# Patient Record
Sex: FEMALE | Race: WHITE | HISPANIC OR LATINO | Employment: UNEMPLOYED | URBAN - METROPOLITAN AREA
[De-identification: names, ages, dates, MRNs, and addresses within clinical notes are randomized per-mention and may not be internally consistent; named-entity substitution may affect disease eponyms.]

---

## 2017-02-15 ENCOUNTER — OFFICE VISIT (OUTPATIENT)
Dept: OBSTETRICS AND GYNECOLOGY | Age: 42
End: 2017-02-15

## 2017-02-15 ENCOUNTER — PROCEDURE VISIT (OUTPATIENT)
Dept: OBSTETRICS AND GYNECOLOGY | Age: 42
End: 2017-02-15

## 2017-02-15 VITALS
DIASTOLIC BLOOD PRESSURE: 62 MMHG | SYSTOLIC BLOOD PRESSURE: 118 MMHG | BODY MASS INDEX: 20.2 KG/M2 | HEIGHT: 63 IN | WEIGHT: 114 LBS

## 2017-02-15 DIAGNOSIS — R10.2 PELVIC PAIN: Primary | ICD-10-CM

## 2017-02-15 DIAGNOSIS — D25.9 UTERINE LEIOMYOMA, UNSPECIFIED LOCATION: ICD-10-CM

## 2017-02-15 LAB
BILIRUB BLD-MCNC: NEGATIVE MG/DL
CLARITY, POC: CLEAR
COLOR UR: YELLOW
GLUCOSE UR STRIP-MCNC: NEGATIVE MG/DL
KETONES UR QL: NEGATIVE
LEUKOCYTE EST, POC: NEGATIVE
NITRITE UR-MCNC: NEGATIVE MG/ML
PH UR: 6 [PH] (ref 5–8)
PROT UR STRIP-MCNC: NEGATIVE MG/DL
RBC # UR STRIP: NEGATIVE /UL
SP GR UR: 1.01 (ref 1–1.03)
UROBILINOGEN UR QL: NORMAL

## 2017-02-15 PROCEDURE — 76830 TRANSVAGINAL US NON-OB: CPT | Performed by: PHYSICIAN ASSISTANT

## 2017-02-15 PROCEDURE — 81002 URINALYSIS NONAUTO W/O SCOPE: CPT | Performed by: PHYSICIAN ASSISTANT

## 2017-02-15 PROCEDURE — 99213 OFFICE O/P EST LOW 20 MIN: CPT | Performed by: PHYSICIAN ASSISTANT

## 2017-02-15 RX ORDER — RIZATRIPTAN BENZOATE 10 MG/1
10 TABLET ORAL
COMMUNITY
Start: 2015-04-14 | End: 2017-05-17

## 2017-02-15 RX ORDER — ZOLPIDEM TARTRATE 12.5 MG/1
TABLET, FILM COATED, EXTENDED RELEASE ORAL
COMMUNITY
Start: 2017-01-31 | End: 2017-05-17

## 2017-02-15 NOTE — PROGRESS NOTES
"Subjective     Chief Complaint   Patient presents with   • Abdominal Pain       Cari Knight is a 41 y.o. No obstetric history on file. whose LMP is Patient's last menstrual period was 01/20/2017 (approximate). presents with new onset pelvic pain.  Started with LLQ pain approx 6 months.  Then had pain on B pelvic regions approx 2 wks ago.  It was like contractions. Would get 1-1 1/2 minutes rest b/t them.  She did take ibuprofen and didn't feel like it helped.  Friday night, then r/s with it a week later    Does have regular periods but has started with cramping more recently.  She has been seen recently for infertility work up but says she is not trying for fertility.      No significant med hx    She is a regular pt of Dr Anthony's, will be due for her routine annual at the end of March, she is new to me    No Additional Complaints Reported    The following portions of the patient's history were reviewed and updated as appropriate:vital signs, allergies, current medications, past medical history, past social history, past surgical history and problem list      Review of Systems   Pertinent items are noted in HPI.     Objective        Visit Vitals   • /62   • Ht 63\" (160 cm)   • Wt 114 lb (51.7 kg)   • LMP 01/20/2017 (Approximate)   • Breastfeeding No   • BMI 20.19 kg/m2       Physical Exam    General:   alert, comfortable and no distress   Heart: Not performed today   Lungs: Not performed today.   Breast: Not performed today   Neck: na   Abdomen: {soft, non-tender. Bowel sounds normal. No masses,  no organomegaly   CVA: absent   Pelvis: Not performed today   Extremities: Not performed today   Neurologic: negative   Psychiatric: Normal affect, judgement, and mood       Lab Review   Labs: Urinalysis - with micro     Imaging   Ultrasound - Pelvic Vaginal    Assessment/Plan     ASSESSMENT  1. Pelvic pain    2. Uterine leiomyoma, unspecified location        PLAN  1.   Orders Placed This Encounter   Procedures " "  • Urine Culture   • POC Urinalysis Dipstick       2. Medications prescribed this encounter:        New Medications Ordered This Visit   Medications   • zolpidem CR (AMBIEN CR) 12.5 MG CR tablet     Sig: TAKE ONE TABLET BY MOUTH ONCE NIGHTLY AS NEEDED FOR SLEEP   • rizatriptan (MAXALT) 10 MG tablet     Sig: Take 10 mg by mouth.       3. Call if acute pain happens and we can  Try to work in for same day u/s.  Recommend f/u with PCP to consider CT if pain persists to r/o GI or Appendix issues.  Will r/o UTI.  Pt has routine exam scheduled for end of March, will keep that  4.  Enc good fiber and hydration to keep bowels stable as well  5. Discussed trying BCP to get ovaries to \"quiet down\" and if by some chance this is endometriosis related, that would help with those sx's as well  U/S done and shows a fibroid that measures 18.8 mm, unlikely cause of her pain.  Lining measures 10.83 mm, appropriate that she is getting ready to start her cycle.  B ovaries are wnl.      Follow up: 1 month(s)    JASVIR Farmer  2/15/2017           "

## 2017-02-17 ENCOUNTER — TELEPHONE (OUTPATIENT)
Dept: OBSTETRICS AND GYNECOLOGY | Age: 42
End: 2017-02-17

## 2017-02-17 LAB
BACTERIA UR CULT: NO GROWTH
BACTERIA UR CULT: NORMAL

## 2017-02-17 NOTE — TELEPHONE ENCOUNTER
----- Message from JASVIR Kinney sent at 2/17/2017  9:18 AM EST -----  Let pt know her urine culture is neg for infection

## 2017-04-10 ENCOUNTER — TELEPHONE (OUTPATIENT)
Dept: OBSTETRICS AND GYNECOLOGY | Age: 42
End: 2017-04-10

## 2017-04-10 ENCOUNTER — OFFICE VISIT (OUTPATIENT)
Dept: OBSTETRICS AND GYNECOLOGY | Age: 42
End: 2017-04-10

## 2017-04-10 ENCOUNTER — PROCEDURE VISIT (OUTPATIENT)
Dept: OBSTETRICS AND GYNECOLOGY | Age: 42
End: 2017-04-10

## 2017-04-10 VITALS
HEIGHT: 63 IN | SYSTOLIC BLOOD PRESSURE: 120 MMHG | BODY MASS INDEX: 19.67 KG/M2 | WEIGHT: 111 LBS | DIASTOLIC BLOOD PRESSURE: 80 MMHG

## 2017-04-10 DIAGNOSIS — N92.0 MENORRHAGIA WITH REGULAR CYCLE: ICD-10-CM

## 2017-04-10 DIAGNOSIS — R10.2 PELVIC PAIN: Primary | ICD-10-CM

## 2017-04-10 DIAGNOSIS — D25.9 UTERINE LEIOMYOMA, UNSPECIFIED LOCATION: ICD-10-CM

## 2017-04-10 DIAGNOSIS — R10.32 LLQ PAIN: Primary | ICD-10-CM

## 2017-04-10 LAB
BILIRUB BLD-MCNC: NEGATIVE MG/DL
CLARITY, POC: CLEAR
COLOR UR: YELLOW
GLUCOSE UR STRIP-MCNC: NEGATIVE MG/DL
KETONES UR QL: ABNORMAL
LEUKOCYTE EST, POC: NEGATIVE
NITRITE UR-MCNC: NEGATIVE MG/ML
PH UR: 5.5 [PH] (ref 5–8)
PROT UR STRIP-MCNC: NEGATIVE MG/DL
RBC # UR STRIP: ABNORMAL /UL
SP GR UR: 1.03 (ref 1–1.03)
UROBILINOGEN UR QL: NORMAL

## 2017-04-10 PROCEDURE — 99214 OFFICE O/P EST MOD 30 MIN: CPT | Performed by: NURSE PRACTITIONER

## 2017-04-10 PROCEDURE — 76830 TRANSVAGINAL US NON-OB: CPT | Performed by: OBSTETRICS & GYNECOLOGY

## 2017-04-10 PROCEDURE — 81002 URINALYSIS NONAUTO W/O SCOPE: CPT | Performed by: NURSE PRACTITIONER

## 2017-04-10 NOTE — TELEPHONE ENCOUNTER
Dr Bustos pt, started period last night and has been having L side pelvic pain. Pt states that is a sharp stabbing pain and will sometimes ease up to a dull throb. Pt is wanting an U/S, please advise.    Pt # [phone]

## 2017-04-10 NOTE — PROGRESS NOTES
"Subjective   Cari Knight is a 41 y.o. female is here today as a self referral.    History of Present Illness     Chief Complaint   Patient presents with   • Abdominal Pain     Left side     Patient here today with left sided lower abdominal pain that started with her cycle yesterday evening.  She is a new patient to me but this is not a new problem.  She has been seen for this problem on a few occasions.  Her most recent visit was in February and they were not able to find a source of her pain.  She describes it as severe and lasting for 10-15 seconds at a time.  Usually she says that the pain is at the end of her cycle but this time it was the beginning.  She currently isn't having pain but was when she was driving to our office.  She denies any bowel or bladder problems.  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  Her periods do seem to be getting heavier the older she gets and she reports the pain and occurring for \"years\" but worse for the past 6-8 months.      The following portions of the patient's history were reviewed and updated as appropriate: allergies, current medications, past family history, past medical history, past social history, past surgical history and problem list.    Review of Systems   Constitutional: Negative.    HENT: Negative.    Eyes: Negative.    Respiratory: Negative.    Cardiovascular: Negative.    Gastrointestinal: Negative.    Endocrine: Negative.    Genitourinary: Positive for menstrual problem, pelvic pain and vaginal bleeding. Negative for dyspareunia, dysuria, flank pain and urgency.   Musculoskeletal: Negative.    Skin: Negative.    Allergic/Immunologic: Negative.    Neurological: Negative.    Hematological: Negative.    Psychiatric/Behavioral: Negative.        Objective   Physical Exam   Constitutional: She is oriented to person, place, and time. She appears " well-developed and well-nourished.   Cardiovascular: Normal rate.    Pulmonary/Chest: Effort normal.   Abdominal: Soft. Bowel sounds are normal. She exhibits no distension. There is no tenderness. There is no rebound and no guarding.   Genitourinary: Vagina normal and uterus normal. Uterus is not tender. Cervix exhibits no motion tenderness and no friability. Right adnexum displays no mass, no tenderness and no fullness. Left adnexum displays tenderness. Left adnexum displays no mass and no fullness.   Genitourinary Comments: Cervix very small   Neurological: She is alert and oriented to person, place, and time.   Skin: Skin is warm and dry.   Psychiatric: She has a normal mood and affect. Her behavior is normal.         Assessment/Plan   Cari was seen today for abdominal pain.    Diagnoses and all orders for this visit:    LLQ pain  -     POC Urinalysis Dipstick    Menorrhagia with regular cycle      Ultrasound done and ovaries normal.  She does have a small fibroid that was noted on her previous ultrasound but does not look changed.  Since her pain is always in relation to her periods I would suggest doing something to help lighten her cycle and see if she sees improvement.  We discussed low dose birth control pills that would also suppress her ovaries in case her pain is in relation to them but I see no evidence of that today.  She could also consider an IUD or discuss surgical options at her annual exam with Dr Anthony.  If she does decide to try the IUD I would recommend cytotec prior to placement.  She understands that an IUD may or may not improve her pain since we do not have a definitive diagnosis for the pain.  If other symptoms develop she may consider following up with PCP to rule out any other cause of pain but she feels certain it is related to her menses. We discussed R/B/A of different IUDs and brochures were given.  Discussed no unprotected IC 2 weeks prior to insertion.

## 2017-04-10 NOTE — TELEPHONE ENCOUNTER
She can come at 10:30 for ultrasound and 10:45 with me.  If she can/t do that you can offer her later this week.

## 2017-05-17 ENCOUNTER — OFFICE VISIT (OUTPATIENT)
Dept: OBSTETRICS AND GYNECOLOGY | Age: 42
End: 2017-05-17

## 2017-05-17 VITALS
DIASTOLIC BLOOD PRESSURE: 80 MMHG | SYSTOLIC BLOOD PRESSURE: 130 MMHG | BODY MASS INDEX: 19.49 KG/M2 | HEIGHT: 63 IN | WEIGHT: 110 LBS

## 2017-05-17 DIAGNOSIS — R10.2 PELVIC PAIN: ICD-10-CM

## 2017-05-17 DIAGNOSIS — N92.0 MENORRHAGIA WITH REGULAR CYCLE: ICD-10-CM

## 2017-05-17 DIAGNOSIS — Z11.51 SCREENING FOR HPV (HUMAN PAPILLOMAVIRUS): ICD-10-CM

## 2017-05-17 DIAGNOSIS — Z01.419 WELL WOMAN EXAM WITH ROUTINE GYNECOLOGICAL EXAM: Primary | ICD-10-CM

## 2017-05-17 PROCEDURE — 99396 PREV VISIT EST AGE 40-64: CPT | Performed by: OBSTETRICS & GYNECOLOGY

## 2017-05-17 RX ORDER — ZOLPIDEM TARTRATE 6.25 MG/1
12.5 TABLET, FILM COATED, EXTENDED RELEASE ORAL
COMMUNITY
Start: 2017-04-28 | End: 2018-07-10 | Stop reason: SDUPTHER

## 2017-05-17 RX ORDER — ADAPALENE 0.1 G/100G
CREAM TOPICAL
COMMUNITY
Start: 2016-10-21 | End: 2018-11-16 | Stop reason: SDUPTHER

## 2017-05-19 LAB
CYTOLOGIST CVX/VAG CYTO: NORMAL
CYTOLOGY CVX/VAG DOC THIN PREP: NORMAL
DX ICD CODE: NORMAL
HIV 1 & 2 AB SER-IMP: NORMAL
HPV I/H RISK 1 DNA CVX QL PROBE+SIG AMP: NEGATIVE
OTHER STN SPEC: NORMAL
PATH REPORT.FINAL DX SPEC: NORMAL
STAT OF ADQ CVX/VAG CYTO-IMP: NORMAL

## 2017-05-23 ENCOUNTER — TELEPHONE (OUTPATIENT)
Dept: OBSTETRICS AND GYNECOLOGY | Age: 42
End: 2017-05-23

## 2018-07-10 ENCOUNTER — OFFICE VISIT (OUTPATIENT)
Dept: OBSTETRICS AND GYNECOLOGY | Age: 43
End: 2018-07-10

## 2018-07-10 VITALS
BODY MASS INDEX: 19.03 KG/M2 | WEIGHT: 107.4 LBS | SYSTOLIC BLOOD PRESSURE: 126 MMHG | HEIGHT: 63 IN | DIASTOLIC BLOOD PRESSURE: 88 MMHG

## 2018-07-10 DIAGNOSIS — L72.3 INFLAMED SEBACEOUS CYST: Primary | ICD-10-CM

## 2018-07-10 PROCEDURE — 99213 OFFICE O/P EST LOW 20 MIN: CPT | Performed by: OBSTETRICS & GYNECOLOGY

## 2018-07-10 RX ORDER — ZOLPIDEM TARTRATE 12.5 MG/1
TABLET, FILM COATED, EXTENDED RELEASE ORAL
COMMUNITY
Start: 2018-05-29 | End: 2018-11-16 | Stop reason: SDUPTHER

## 2018-07-10 RX ORDER — METOPROLOL SUCCINATE 50 MG/1
TABLET, EXTENDED RELEASE ORAL
COMMUNITY
Start: 2018-06-15 | End: 2018-11-16 | Stop reason: SDUPTHER

## 2018-07-10 NOTE — PROGRESS NOTES
"Subjective     Chief Complaint   Patient presents with   • Follow-up     left side lump       Cari Mathis is a 43 y.o.  whose LMP is Patient's last menstrual period was 2018 (exact date). presents with some left axillary skin lesions. She noticed them in the mirror. They were red raised hard lesions in the middle of the left axilla in the skin. No deeper lesions noted. No breast lesions or changes noted. Last MMG 2017 normal. The skin lesions are resolving. She denies having similar lesions in the groin area.    No Additional Complaints Reported    The following portions of the patient's history were reviewed and updated as appropriate:vital signs, allergies, current medications, past family history, past medical history, past social history, past surgical history and problem list      Review of Systems   Constitutional: negative for fever, chills, activity change, appetite change, fatigue and unexpected weight change.  Eyes: negative  Ears, nose, mouth, throat, and face: negative  Respiratory: negative  Cardiovascular: negative  Gastrointestinal: negative  Genitourinary:negative  Integument/breast: positive for left axillary skin lesion  Hematologic/lymphatic: negative  Musculoskeletal:negative  Neurological: negative  Behavioral/Psych: negative  Endocrine: negative     Objective      /88   Ht 160 cm (63\")   Wt 48.7 kg (107 lb 6.4 oz)   LMP 2018 (Exact Date)   BMI 19.03 kg/m²     Physical Exam    General:   alert, appears stated age and no distress   Heart: Not performed today   Lungs: Not performed today.   Breast: Left breast- no masses, no nipple changes or discharge, left axilla without LAD, 2 small resolving skin lesions consistent w/ sebaceous cyst noted   Neck: thyroid not enlarged, symmetric, no tenderness/mass/nodules   Abdomen: {Not performed today   CVA:    Pelvis:    Extremities: Extremities normal, atraumatic, no cyanosis or edema   Neurologic: negative   Psychiatric: " Normal affect, judgement, and mood       Lab Review   Labs: No data reviewed     Imaging   No data reviewed    Assessment/Plan     ASSESSMENT  1. Inflamed sebaceous cyst    of left axilla    PLAN  1. No orders of the defined types were placed in this encounter.      2. Medications prescribed this encounter:      No orders of the defined types were placed in this encounter.      3. IF continues to have recurrent infected sebaceous cysts, we can have general surgery excise them. May also consider laser hair removal to avoid cutting skin and introducing bacteria and increasing risk of infections.    Follow up: for annual and MMG when able     Demi Anthony MD  7/10/2018

## 2018-11-16 ENCOUNTER — OFFICE VISIT (OUTPATIENT)
Dept: OBSTETRICS AND GYNECOLOGY | Age: 43
End: 2018-11-16

## 2018-11-16 ENCOUNTER — PROCEDURE VISIT (OUTPATIENT)
Dept: OBSTETRICS AND GYNECOLOGY | Age: 43
End: 2018-11-16

## 2018-11-16 ENCOUNTER — APPOINTMENT (OUTPATIENT)
Dept: WOMENS IMAGING | Facility: HOSPITAL | Age: 43
End: 2018-11-16

## 2018-11-16 VITALS
BODY MASS INDEX: 18.61 KG/M2 | WEIGHT: 105 LBS | DIASTOLIC BLOOD PRESSURE: 74 MMHG | HEIGHT: 63 IN | SYSTOLIC BLOOD PRESSURE: 120 MMHG

## 2018-11-16 DIAGNOSIS — Z12.31 VISIT FOR SCREENING MAMMOGRAM: Primary | ICD-10-CM

## 2018-11-16 DIAGNOSIS — Z01.419 ENCOUNTER FOR GYNECOLOGICAL EXAMINATION: Primary | ICD-10-CM

## 2018-11-16 PROCEDURE — 99396 PREV VISIT EST AGE 40-64: CPT | Performed by: NURSE PRACTITIONER

## 2018-11-16 PROCEDURE — 77067 SCR MAMMO BI INCL CAD: CPT | Performed by: RADIOLOGY

## 2018-11-16 RX ORDER — METOPROLOL SUCCINATE 50 MG/1
TABLET, EXTENDED RELEASE ORAL
COMMUNITY
Start: 2018-09-11 | End: 2023-02-16

## 2018-11-16 RX ORDER — ADAPALENE 0.1 G/100G
CREAM TOPICAL
COMMUNITY
Start: 2016-10-21 | End: 2019-05-17

## 2018-11-16 RX ORDER — ZOLPIDEM TARTRATE 12.5 MG/1
12.5 TABLET, FILM COATED, EXTENDED RELEASE ORAL
COMMUNITY
Start: 2018-05-29 | End: 2021-10-14

## 2018-11-16 RX ORDER — SPIRONOLACTONE 50 MG/1
TABLET, FILM COATED ORAL
COMMUNITY
Start: 2018-11-11 | End: 2019-05-17

## 2018-11-16 NOTE — PROGRESS NOTES
Subjective       History of Present Illness    Chief Complaint   Patient presents with   • Gynecologic Exam     Last pap 17 negative, HPV negative. MG today.       Cari Mathis is a 43 y.o. female who presents for annual exam.  No problems. Periods have been ok this year. They are regular and not too heavy but they are long.  She had discussed the mirena last year due to menorrhagia but decided against it and cycles seemed to improve.  She did have a long cycle this past month lasting 2 weeks.  She recently started spironolactone for acne and thought it may be related.  No bowel or bladder problems.      OB History    Para Term  AB Living   1 1 0 1   2   SAB TAB Ectopic Molar Multiple Live Births           1 2      # Outcome Date GA Lbr [place]/2nd Weight Sex Delivery Anes PTL Lv   1A  2008 34w0d  2126 g (4 lb 11 oz) F CS-Unspec  N QUENTIN      Complications: Severe pre-eclampsia   1B   34w0d  2070 g (4 lb 9 oz) F CS-Unspec   QUENTIN          The following portions of the patient's history were reviewed and updated as appropriate: allergies, current medications, past family history, past medical history, past social history, past surgical history and problem list.    Her menses are regular every 28-30 days, lasting 4-7 days.    Current contraception: none (history of infertility)  History of abnormal Pap smear: no  Received Gardasil immunization: no  Perform regular self breast exam: yes -    Family history of uterine or ovarian cancer: no  Family History of colon cancer: no  Family history of breast cancer: yes - mom dx 30's (patient is BRCA negative)    Mammogram: done today.  Colonoscopy: not indicated.  DEXA: not indicated.  Last Pap:neg/neg     Social History    Tobacco Use      Smoking status: Never Smoker    Exercise: not active  Calcium/Vitamin D: adequate intake    The following portions of the patient's history were reviewed and updated as appropriate: allergies, current  "medications, past family history, past medical history, past social history, past surgical history and problem list.    Review of Systems   Constitutional: Negative.    HENT: Negative.    Eyes: Negative.    Respiratory: Negative.    Cardiovascular: Negative.    Gastrointestinal: Negative.    Endocrine: Negative.    Genitourinary: Negative.    Musculoskeletal: Negative.    Skin: Negative.    Allergic/Immunologic: Negative.    Neurological: Negative.    Hematological: Negative.    Psychiatric/Behavioral: Negative.          Objective   Physical Exam   Constitutional: She is oriented to person, place, and time. She appears well-developed and well-nourished.   Neck: No thyroid mass present.   Cardiovascular: Normal rate, regular rhythm and normal heart sounds.   Pulmonary/Chest: Effort normal and breath sounds normal. Right breast exhibits no mass, no nipple discharge, no skin change and no tenderness. Left breast exhibits no mass, no nipple discharge, no skin change and no tenderness.   Abdominal: Soft. There is no tenderness.   Genitourinary: Vagina normal and uterus normal. There is no rash or lesion on the right labia. There is no rash or lesion on the left labia. Cervix exhibits no motion tenderness, no discharge and no friability. Right adnexum displays no mass and no tenderness. Left adnexum displays no mass and no tenderness.   Neurological: She is alert and oriented to person, place, and time.   Psychiatric: She has a normal mood and affect. Her behavior is normal.   Vitals reviewed.      /74   Ht 160 cm (63\")   Wt 47.6 kg (105 lb)   LMP 10/30/2018   BMI 18.60 kg/m²     Assessment/Plan   Cari was seen today for gynecologic exam.    Diagnoses and all orders for this visit:    Encounter for gynecological examination  -     IGP,rfx Aptima HPV All Pth; Future        Breast self exam technique reviewed and patient encouraged to perform self-exam monthly.  Discussed healthy lifestyle modifications.  Pap " smear sent with reflex HPV (patient prefers it be sent every year)  Recommended 30 minutes of aerobic exercise five times per week.  Discussed calcium needs to prevent osteoporosis  Discussed treatment options if cycles continue to be long or bothersome.  We discussed IUD or OCPs.  Patient declines anything at this time but will consider if needed and let us know.

## 2018-11-20 LAB
CONV .: NORMAL
CYTOLOGIST CVX/VAG CYTO: NORMAL
CYTOLOGY CVX/VAG DOC THIN PREP: NORMAL
DX ICD CODE: NORMAL
HIV 1 & 2 AB SER-IMP: NORMAL
OTHER STN SPEC: NORMAL
PATH REPORT.FINAL DX SPEC: NORMAL
STAT OF ADQ CVX/VAG CYTO-IMP: NORMAL

## 2019-05-14 ENCOUNTER — TELEPHONE (OUTPATIENT)
Dept: OBSTETRICS AND GYNECOLOGY | Age: 44
End: 2019-05-14

## 2019-05-14 NOTE — TELEPHONE ENCOUNTER
She should check a pregnancy test to make sure that is negative.  Can take motrin 600mg TID today for bleeding and schedule appointment with ultrasound later this week to evaluate. If bleeding increases and she feels she needs immediate evaluation she can go to the ER.

## 2019-05-14 NOTE — TELEPHONE ENCOUNTER
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 Please advise

## 2019-05-17 ENCOUNTER — OFFICE VISIT (OUTPATIENT)
Dept: OBSTETRICS AND GYNECOLOGY | Age: 44
End: 2019-05-17

## 2019-05-17 ENCOUNTER — PROCEDURE VISIT (OUTPATIENT)
Dept: OBSTETRICS AND GYNECOLOGY | Age: 44
End: 2019-05-17

## 2019-05-17 VITALS
WEIGHT: 105 LBS | HEIGHT: 63 IN | SYSTOLIC BLOOD PRESSURE: 118 MMHG | DIASTOLIC BLOOD PRESSURE: 74 MMHG | BODY MASS INDEX: 18.61 KG/M2

## 2019-05-17 DIAGNOSIS — Z80.3 FAMILY HISTORY OF BREAST CANCER: ICD-10-CM

## 2019-05-17 DIAGNOSIS — N92.6 IRREGULAR BLEEDING: Primary | ICD-10-CM

## 2019-05-17 DIAGNOSIS — N92.0 MENORRHAGIA WITH REGULAR CYCLE: Primary | ICD-10-CM

## 2019-05-17 PROCEDURE — 99213 OFFICE O/P EST LOW 20 MIN: CPT | Performed by: PHYSICIAN ASSISTANT

## 2019-05-17 PROCEDURE — 76830 TRANSVAGINAL US NON-OB: CPT | Performed by: OBSTETRICS & GYNECOLOGY

## 2019-05-17 NOTE — PROGRESS NOTES
"Subjective     Chief Complaint   Patient presents with   • Gynecologic Exam     c/o irregular heavy bleeding        Cari Mathis is a 44 y.o.  whose LMP is Patient's last menstrual period was 2019. presents with irregular bleeding times 1    Pt had a normal period on Monday but then started with heavy bleeding on Tuesday that was so bad she couldn't go to work  She denies excess cramping  She is not using BC but uses \"pull and pray\" method  She was on spironolactone for 2 months but stopped b/c of nonstop bleeding  She is otherwise healthy  Does wonder about menopause as had an aunt that went through it \"early\"    She does have a strong family h/o breast cancer  Mom  at 36 yoa from it  She was tested but it was \"years ago\" possibly while in [place]  She has been doing mammograms since she was 25    She is a pt of Dr Raj Gipson new med hx.    She is on meds for HTN    No Additional Complaints Reported    The following portions of the patient's history were reviewed and updated as appropriate:vital signs, allergies, current medications, past family history, past medical history, past social history, past surgical history and problem list      Review of Systems   Genitourinary:positive for abnormal menstrual periods     Objective      /74   Ht 160 cm (63\")   Wt 47.6 kg (105 lb)   LMP 2019   BMI 18.60 kg/m²     Physical Exam    General:   alert, comfortable and no distress   Heart: regular rate and rhythm   Lungs: Not performed today.   Breast: Not performed today   Neck: na   Abdomen: {Not performed today   CVA: Not performed today   Pelvis: Not performed today   Extremities: Not performed today   Neurologic: negative   Psychiatric: Normal affect, judgement, and mood       Lab Review   Labs: No data reviewed     Imaging   Ultrasound - Pelvic Vaginal  Fibroid noted that measures 1.6 x 1.9 cm. Endo thickness is 3.16 mm.  B ovaries wnl    Assessment/Plan     ASSESSMENT  1. Irregular " "bleeding    2. Family history of breast cancer        PLAN  1.   Orders Placed This Encounter   Procedures   • TSH   • [facility] Hereditary Cancer Screen   • HCG, B-subunit, Quantitative   • Follicle Stimulating Hormone   • CBC & Differential       2.  Plan labs today.  U/s is reassuring. Pt more interested in knowing \"nothing is wrong\" then treating her bleeding or starting contraception.  However I did discuss the need for regulation of her cycle and also discussed possibly need of endometrial biopsy if irregular bleeding persists.  I also did discuss the options for treatment of her bleeding-IUD (kyleena), POP, provera or ablation.  I gave a HO as well.      3. We opted to re test pt for genes identification as I suspect she only had BRCA testing done given how long ago her testing was.  This could also help us to decide on future tx options as well.    Follow up: JASVIR Ramos  5/17/2019           "

## 2019-05-18 LAB
BASOPHILS # BLD AUTO: 0.08 10*3/MM3 (ref 0–0.2)
BASOPHILS NFR BLD AUTO: 1.4 % (ref 0–1.5)
EOSINOPHIL # BLD AUTO: 0.07 10*3/MM3 (ref 0–0.4)
EOSINOPHIL NFR BLD AUTO: 1.2 % (ref 0.3–6.2)
ERYTHROCYTE [DISTWIDTH] IN BLOOD BY AUTOMATED COUNT: 14.6 % (ref 12.3–15.4)
FSH SERPL-ACNC: 17.9 MIU/ML
HCG INTACT+B SERPL-ACNC: <0.5 MIU/ML
HCT VFR BLD AUTO: 33.9 % (ref 34–46.6)
HGB BLD-MCNC: 10.3 G/DL (ref 12–15.9)
IMM GRANULOCYTES # BLD AUTO: 0.01 10*3/MM3 (ref 0–0.05)
IMM GRANULOCYTES NFR BLD AUTO: 0.2 % (ref 0–0.5)
LYMPHOCYTES # BLD AUTO: 1.71 10*3/MM3 (ref 0.7–3.1)
LYMPHOCYTES NFR BLD AUTO: 29.6 % (ref 19.6–45.3)
MCH RBC QN AUTO: 27.2 PG (ref 26.6–33)
MCHC RBC AUTO-ENTMCNC: 30.4 G/DL (ref 31.5–35.7)
MCV RBC AUTO: 89.4 FL (ref 79–97)
MONOCYTES # BLD AUTO: 0.37 10*3/MM3 (ref 0.1–0.9)
MONOCYTES NFR BLD AUTO: 6.4 % (ref 5–12)
NEUTROPHILS # BLD AUTO: 3.53 10*3/MM3 (ref 1.7–7)
NEUTROPHILS NFR BLD AUTO: 61.2 % (ref 42.7–76)
NRBC BLD AUTO-RTO: 0 /100 WBC (ref 0–0.2)
PLATELET # BLD AUTO: 224 10*3/MM3 (ref 140–450)
RBC # BLD AUTO: 3.79 10*6/MM3 (ref 3.77–5.28)
TSH SERPL DL<=0.005 MIU/L-ACNC: 1.11 MIU/ML (ref 0.27–4.2)
WBC # BLD AUTO: 5.77 10*3/MM3 (ref 3.4–10.8)

## 2019-05-20 ENCOUNTER — TELEPHONE (OUTPATIENT)
Dept: OBSTETRICS AND GYNECOLOGY | Age: 44
End: 2019-05-20

## 2019-05-20 NOTE — TELEPHONE ENCOUNTER
----- Message from JASVIR Kinney sent at 5/20/2019 12:09 PM EDT -----  Her thyroid is wnl, pregnancy test is negative.  She is anemic, could be r/t her bleeding. I would encourage adding ferrous sulfate 325 mg daily and then plan f/u with us in 4-6 wks for recheck of her blood count.

## 2019-06-13 DIAGNOSIS — Z91.89 AT HIGH RISK FOR BREAST CANCER: Primary | ICD-10-CM

## 2019-06-18 ENCOUNTER — TELEPHONE (OUTPATIENT)
Dept: SURGERY | Facility: CLINIC | Age: 44
End: 2019-06-18

## 2019-06-18 NOTE — TELEPHONE ENCOUNTER
New Patient appointment with Dr. Arreaga is scheduled on 7/5/2019 @ 1:00pm.    Called and spoke to patient, patient expressed verbal understanding of appointment time.    Sent patient a reminder letter in the mail.

## 2019-06-21 ENCOUNTER — TELEPHONE (OUTPATIENT)
Dept: SURGERY | Facility: CLINIC | Age: 44
End: 2019-06-21

## 2019-06-24 ENCOUNTER — OFFICE VISIT (OUTPATIENT)
Dept: OBSTETRICS AND GYNECOLOGY | Age: 44
End: 2019-06-24

## 2019-06-24 VITALS
WEIGHT: 103 LBS | SYSTOLIC BLOOD PRESSURE: 110 MMHG | BODY MASS INDEX: 18.25 KG/M2 | DIASTOLIC BLOOD PRESSURE: 68 MMHG | HEIGHT: 63 IN

## 2019-06-24 DIAGNOSIS — D50.8 OTHER IRON DEFICIENCY ANEMIA: Primary | ICD-10-CM

## 2019-06-24 DIAGNOSIS — Z91.89 AT RISK FOR BREAST CANCER: ICD-10-CM

## 2019-06-24 DIAGNOSIS — N92.1 MENORRHAGIA WITH IRREGULAR CYCLE: ICD-10-CM

## 2019-06-24 LAB
BASOPHILS # BLD AUTO: 0.07 10*3/MM3 (ref 0–0.2)
BASOPHILS NFR BLD AUTO: 1.4 % (ref 0–1.5)
EOSINOPHIL # BLD AUTO: 0.06 10*3/MM3 (ref 0–0.4)
EOSINOPHIL NFR BLD AUTO: 1.2 % (ref 0.3–6.2)
ERYTHROCYTE [DISTWIDTH] IN BLOOD BY AUTOMATED COUNT: 14.9 % (ref 12.3–15.4)
HCT VFR BLD AUTO: 37.6 % (ref 34–46.6)
HGB BLD-MCNC: 11.7 G/DL (ref 12–15.9)
IMM GRANULOCYTES # BLD AUTO: 0.01 10*3/MM3 (ref 0–0.05)
IMM GRANULOCYTES NFR BLD AUTO: 0.2 % (ref 0–0.5)
LYMPHOCYTES # BLD AUTO: 1.76 10*3/MM3 (ref 0.7–3.1)
LYMPHOCYTES NFR BLD AUTO: 34.9 % (ref 19.6–45.3)
MCH RBC QN AUTO: 28.5 PG (ref 26.6–33)
MCHC RBC AUTO-ENTMCNC: 31.1 G/DL (ref 31.5–35.7)
MCV RBC AUTO: 91.5 FL (ref 79–97)
MONOCYTES # BLD AUTO: 0.28 10*3/MM3 (ref 0.1–0.9)
MONOCYTES NFR BLD AUTO: 5.6 % (ref 5–12)
NEUTROPHILS # BLD AUTO: 2.86 10*3/MM3 (ref 1.7–7)
NEUTROPHILS NFR BLD AUTO: 56.7 % (ref 42.7–76)
NRBC BLD AUTO-RTO: 0 /100 WBC (ref 0–0.2)
PLATELET # BLD AUTO: 232 10*3/MM3 (ref 140–450)
RBC # BLD AUTO: 4.11 10*6/MM3 (ref 3.77–5.28)
WBC # BLD AUTO: 5.04 10*3/MM3 (ref 3.4–10.8)

## 2019-06-24 PROCEDURE — 99213 OFFICE O/P EST LOW 20 MIN: CPT | Performed by: PHYSICIAN ASSISTANT

## 2019-06-24 RX ORDER — FERROUS SULFATE 325(65) MG
325 TABLET ORAL
COMMUNITY
End: 2021-10-14

## 2019-06-24 NOTE — PROGRESS NOTES
"Subjective     Chief Complaint   Patient presents with   • Consult     consult follow up on anemia, period came on time just really heavy       Cari Mathis is a 44 y.o.  whose LMP is Patient's last menstrual period was 06/10/2019 (approximate). presents for f/u of her labs  She was seen here last month with c/o heavy period   She had some labs drawn at that appt and found that her hgb was 10.3  Last hgb in Nov was 11.1  She has been taking iron since we sylvia those labs and notes less fatigue but also notes she is a  and is on summer break  She has had another menses since our last visit and notes \"heavy bleeding\" but not as bad as the last cycle in may  She is not using BC but notes little IC usually    She did do the myrisk testing and had a negative result but was noted to have 2 variants of uncertain significance on BRCA2 and MLH1  She has an appt with Dr Arreaga to further discuss these findings    Pt of Dr Anthony  New to me with this concern      No Additional Complaints Reported    The following portions of the patient's history were reviewed and updated as appropriate:vital signs, allergies, current medications, past family history, past medical history, past social history, past surgical history and problem list      Review of Systems   Anemia and menorrhagia     Objective      /68   Ht 160 cm (63\")   Wt 46.7 kg (103 lb)   LMP 06/10/2019 (Approximate)   Breastfeeding? No   BMI 18.25 kg/m²     Physical Exam    General:   alert, comfortable and no distress   Heart: Not performed today   Lungs: Not performed today.   Breast: Not performed today   Neck: na   Abdomen: {Not performed today   CVA: Not performed today   Pelvis: Not performed today   Extremities: Not performed today   Neurologic: negative   Psychiatric: Normal affect, judgement, and mood       Lab Review   Labs: CBC     Imaging   No data reviewed    Assessment/Plan     ASSESSMENT  1. Other iron deficiency anemia    2. " Menorrhagia with irregular cycle    3. At risk for breast cancer        PLAN  1.   Orders Placed This Encounter   Procedures   • CBC & Differential       2. Medications prescribed this encounter:        New Medications Ordered This Visit   Medications   • progesterone (PROMETRIUM) 100 MG capsule     Sig: Take 1 capsule by mouth Daily for 10 days.     Dispense:  10 capsule     Refill:  0       I sent in prometrium for the pt to try with next menses if she has heavy bleeding. Advised condoms when SA as well. Disc tx options-POP, IUD, ablation or progesterone monthly. She is not really sure what she wants to do and her unusual genetics results are interesting as well so we have opted to defer to Dr Arreaga and her suggestions for now.  We will see where her CBC is also.     Follow up: 5 month(s) for annual or sooner prn    JASVIR Farmer  6/24/2019

## 2019-08-12 ENCOUNTER — OFFICE VISIT (OUTPATIENT)
Dept: SURGERY | Facility: CLINIC | Age: 44
End: 2019-08-12

## 2019-08-12 VITALS
OXYGEN SATURATION: 99 % | SYSTOLIC BLOOD PRESSURE: 124 MMHG | DIASTOLIC BLOOD PRESSURE: 80 MMHG | BODY MASS INDEX: 18.64 KG/M2 | WEIGHT: 105.2 LBS | HEART RATE: 80 BPM | HEIGHT: 63 IN

## 2019-08-12 DIAGNOSIS — Z80.3 FH: BREAST CANCER: Primary | ICD-10-CM

## 2019-08-12 DIAGNOSIS — Z13.79 GENETIC SCREENING: ICD-10-CM

## 2019-08-12 DIAGNOSIS — R92.2 DENSE BREASTS: ICD-10-CM

## 2019-08-12 PROCEDURE — 99243 OFF/OP CNSLTJ NEW/EST LOW 30: CPT | Performed by: SURGERY

## 2019-08-12 NOTE — PROGRESS NOTES
Chief Complaint: Cari Mathis is a 44 y.o. female who was seen in consultation at the request of JASVIR Kinney  for GENETIC EVALUATION and family history breast cancer    History of Present Illness:  Patient presents with management of breast cancer risk and family history breast cancer.   She noted no new masses, skin changes, nipple discharge, nipple changes prior to her most recent imaging.    Her most recent imaging includes the followin2017  [facility]  Mammo  Cari Mathis  MAMMO SCREENING  Heterogeneously dense. I see no new or dominant masses, areas of architectural distortion or skin thickening. There is no evidence for axillary distortion or skin thickening. There is no evidence for axillary lymphadenopathy or nipple retraction. Scattered punctate monomorphic calcifications are seen throughout both breasts.  BIRADS Category 2, Benign.    2018  [facility]  Cari Mathis  Screening mammogram.  Extremely dense.  There are multiple stable milk of calcium and punctate calcifications with diffuse/scattered distribution seen in both breasts. This is a typically benign pattern/process. No suspicious masses, suspicious microcalcifications or new areas of architectural distortion are identified. There has been no significant change from the prior.  BIRADS Category 2, Benign.    She has not had a breast biopsy in the past.  She has her uterus and ovaries, is pre-perimenopausal, and takes nor hormones.  Her family history includes the following:   She has 2 daughters, 2 sisters, 1 MA, 1 PA. Her mother had breast cancer age 32, is  and did not have genetic testing. Her PGM had breast ca age 70s.  She is here for evaluation.    Review of Systems:  Review of Systems   Endocrine: Negative for hot flashes.   All other systems reviewed and are negative.       Past Medical and Surgical History:  Breast Biopsy History:  Patient has not had a breast biopsy in the  past.  Breast Cancer HIstory:  Patient does not have a past medical history of breast cancer.  Breast Operations, and year:  NONE   Obstetric/Gynecologic History:  Age menstrual periods began: 16  Patient is premenopausal, first day of last period: 16  Number of pregnancies: 1  Number of live births: 2  Number of abortions or miscarriages: 0  Age of delivery of first child: 33  Patient breast fed, for the following lenth of time: 3 MONTHS   Length of time taking birth control pills: 5 YRS   Patient has never taken hormone replacement  PATIENT HAS BOTH OVARIES AND UTERUS.     Past Surgical History:   Procedure Laterality Date   •  SECTION       Past Medical History:   Diagnosis Date   • Hypertension    • Kidney stones    • Migraines        Prior Hospitalizations, other than for surgery or childbirth, and year:  KIDNEY STONES     Social History     Socioeconomic History   • Marital status:      Spouse name: Not on file   • Number of children: Not on file   • Years of education: Not on file   • Highest education level: Not on file   Tobacco Use   • Smoking status: Never Smoker   • Smokeless tobacco: Never Used   Substance and Sexual Activity   • Alcohol use: No   • Drug use: No     Patient is .  Patient is employed full time with the following occupation:   Patient drinks 2 servings of caffeine per day.    Family History:  Family History   Problem Relation Age of Onset   • Breast cancer Mother 32   • Cancer Mother 34        female cancer   • Hypertension Father    • No Known Problems Sister    • No Known Problems Brother    • No Known Problems Daughter    • No Known Problems Son    • No Known Problems Maternal Grandmother    • Melanoma Paternal Grandmother 70   • No Known Problems Maternal Aunt    • No Known Problems Paternal Aunt    • Breast cancer Paternal Aunt         p great aunt  unknown age    • BRCA 1/2 Neg Hx    • Colon cancer Neg Hx    • Endometrial cancer Neg Hx    •  "Ovarian cancer Neg Hx    • Uterine cancer Neg Hx        Vital Signs:  /80   Pulse 80   Ht 160 cm (63\")   Wt 47.7 kg (105 lb 3.2 oz)   LMP  (LMP Unknown)   SpO2 99%   Breastfeeding? No   BMI 18.64 kg/m²      Medications:    Current Outpatient Medications:   •  ferrous sulfate 325 (65 FE) MG tablet, Take 325 mg by mouth Daily With Breakfast., Disp: , Rfl:   •  metoprolol succinate XL (TOPROL-XL) 50 MG 24 hr tablet, TAKE ONE TABLET BY MOUTH TWICE A DAY, Disp: , Rfl:   •  zolpidem CR (AMBIEN CR) 12.5 MG CR tablet, Take 12.5 mg by mouth., Disp: , Rfl:   •  rizatriptan (MAXALT) 10 MG tablet, Take 10 mg by mouth., Disp: , Rfl:      Allergies:  Allergies   Allergen Reactions   • Ciprofloxacin-Hydrocortisone Hives   • Dilaudid [Hydromorphone Hcl] Hives       Physical Examination:  /80   Pulse 80   Ht 160 cm (63\")   Wt 47.7 kg (105 lb 3.2 oz)   LMP  (LMP Unknown)   SpO2 99%   Breastfeeding? No   BMI 18.64 kg/m²   General Appearance:  Patient is in no distress.  She is well kept and has an thin build.   Psychiatric:  Patient with appropriate mood and affect. Alert and oriented to self, time, and place.    Breast, RIGHT:  small sized, 34B, symmetric with the contralateral side.  Breast skin is without erythema, edema, rashes.  There are no visible abnormalities upon inspection during the arm-raising maneuver or with hands on hips in the sitting position. There is no nipple retraction, discharge or nipple/areolar skin changes.There are no masses palpable in the sitting or supine positions.    Breast, LEFT:  small sized, 34B,  symmetric with the contralateral side.  Breast skin is without erythema, edema, rashes.  There are no visible abnormalities upon inspection during the arm-raising maneuver or with hands on hips in the sitting position. There is no nipple retraction, discharge or nipple/areolar skin changes.There are no masses palpable in the sitting or supine positions.    Lymphatic:  There is no " axillary, cervical, infraclavicular, or supraclavicular adenopathy bilaterally.  Eyes:  Pupils are round and reactive to light.  Cardiovascular:  Heart rate and rhythm are regular.  Respiratory:  Lungs are clear bilaterally with no crackles or wheezes in any lung field.  Gastrointestinal:  Abdomen is soft, nondistended, and nontender.     Musculoskeletal:  Good strength in all 4 extremities.   There is good range of motion in both shoulders.    Skin:  No new skin lesions or rashes on the skin excluding the breast (see breast exam above).        Imagin2016  [facility]  [facility]  [facility] [employer] [facility]  Screening mammogram.  No change. No evidence of malignancy.  BIRADS 2, Negative.    2017  [facility]  MAMMO SCREENING  Heterogeneously dense. I see no new or dominant masses, areas of architectural distortion or skin thickening. There is no evidence for axillary distortion or skin thickening. There is no evidence for axillary lymphadenopathy or nipple retraction. Scattered punctate monomorphic calcifications are seen throughout both breasts.  BIRADS Category 2, Benign.    2018  [facility]  [facility]  Screening mammogram.  Extremely dense.  There are multiple stable milk of calcium and punctate calcifications with diffuse/scattered distribution seen in both breasts. This is a typically benign pattern/process. No suspicious masses, suspicious microcalcifications or new areas of architectural distortion are identified. There has been no significant change from the prior.  BIRADS Category 2, Benign.    Labs:    2019  [facility]  BRCA1 and BRCA2 Aynalysis  BRCA2 - c.4747A>T; UNCERTAIN CLINICAL SIGNIFICANCE      [facility] Hereditary Cancer Screen  2019  [employer]  GENETIC RESULT: NEGATIVE - NO CLINICALLY SIGNIFICANT MUTATION IDENTIFIED  ADDITIONAL  FINDINGS:  GENE    VARIANT(S) OF UNCERTAIN SIGNIFICANCE  BRCA2    c.4747A>T  MLH1    c. 896G>A          Procedures:      Assessment:   Diagnosis Plan   1. FH: breast cancer  MRI Breast Bilateral With & Without Contrast    Mammo Screening Digital Tomosynthesis Bilateral With CAD   2. Dense breasts  MRI Breast Bilateral With & Without Contrast    Mammo Screening Digital Tomosynthesis Bilateral With CAD   3. Genetic screening     1-  Mother age 32, , did not have genetic testing  PGM age 70s    2-  Extremely dense breast tissue    3-  My Risk-   -VUS in BRCA2 c.4747A>T  -VUS in MLH1 c.896G>A    Plan:  We reviewed her imaging, history, exam, pathology, and family history together today. Her most significant risk factors for breast cancer include the following: mother having had breast cancer, premenopausal  I calculated her lifetime risk of breast cancer using the CRA risk assessment (5 models)  As:8-25%  I calculated her lifetime risk of breast cancer using the Deisi model (not useful for women under the age of 35 years) as: 1.3%  With a lifetime risk of breast cancer greater than 20%, the current recommendations for screening include annual mammography and annual MRI, with clinical examination. She is interested in pursuing this.    We also discussed that for a Deisi model 5 year risk greater than 1.7% or LCIS, and a life expectancy > 10 years, that we recommend risk reduction counseling with the medical oncologists about chemopreventive agents such as tamoxifen, raloxifene, or exemestane.  SHe was not interested in that consultation and her Deisi risk was under 1.7%.    We discussed the limitations and benefits of breast MRI, and discussed q 6 mo versus both annual at the same time and will proceed with the latter.    I will arrange for her mammogram and MRI at [facility] and [facility], respectively, for -19 and see her back after.      We also discussed the variant of uncertain significance detected in the my  risk panel.  We discussed that there is no action needed for a variant of uncertain significance but it would be prudent to check in with a company on interval basis to see if there have been any updates on her variants    Asked her to continue her self breast exam and to call us in the interim with any concerns would be happy to get her back in sooner.      Caitie Solis MD        Next Appointment:  Return for Next scheduled follow up, after imaging.      EMR Dragon/transcription disclaimer:    Much of this encounter note is an electronic transcription/translocation of spoken language to printed text.  The electronic translation of spoken language may permit erroneous, or at times, nonsensical words or phrases to be inadvertently transcribed.  Although I have reviewed the note from such areas, some may still exist.

## 2019-08-15 ENCOUNTER — TELEPHONE (OUTPATIENT)
Dept: SURGERY | Facility: CLINIC | Age: 44
End: 2019-08-15

## 2019-08-15 NOTE — TELEPHONE ENCOUNTER
11/18 MAMMO [facility] @ 9:30AM(9:15 ARRIVAL), MRI  JACK TO FOLLOW @ 10:45AM(10:15 ARRIVAL); F/U W/ AQUILES 12/06 @ 2:00PM(1:45 ARRIVAL). PT CONFIRMED.      KL

## 2019-11-18 ENCOUNTER — APPOINTMENT (OUTPATIENT)
Dept: WOMENS IMAGING | Facility: HOSPITAL | Age: 44
End: 2019-11-18

## 2019-11-18 ENCOUNTER — HOSPITAL ENCOUNTER (OUTPATIENT)
Dept: MRI IMAGING | Facility: HOSPITAL | Age: 44
Discharge: HOME | End: 2019-11-18
Admitting: SURGERY

## 2019-11-18 DIAGNOSIS — R92.2 DENSE BREASTS: ICD-10-CM

## 2019-11-18 DIAGNOSIS — Z80.3 FH: BREAST CANCER: ICD-10-CM

## 2019-11-18 PROCEDURE — 77067 SCR MAMMO BI INCL CAD: CPT | Performed by: RADIOLOGY

## 2019-11-18 PROCEDURE — 77049 MRI BREAST C-+ W/CAD BI: CPT

## 2019-11-18 PROCEDURE — 0 GADOBENATE DIMEGLUMINE 529 MG/ML SOLUTION: Performed by: SURGERY

## 2019-11-18 PROCEDURE — 77063 BREAST TOMOSYNTHESIS BI: CPT | Performed by: RADIOLOGY

## 2019-11-18 PROCEDURE — A9577 INJ MULTIHANCE: HCPCS | Performed by: SURGERY

## 2019-11-18 PROCEDURE — 82565 ASSAY OF CREATININE: CPT

## 2019-11-18 RX ADMIN — GADOBENATE DIMEGLUMINE 9 ML: 529 INJECTION, SOLUTION INTRAVENOUS at 12:01

## 2019-11-19 ENCOUNTER — TELEPHONE (OUTPATIENT)
Dept: SURGERY | Facility: CLINIC | Age: 44
End: 2019-11-19

## 2019-11-19 DIAGNOSIS — R92.8 ABNORMAL MAMMOGRAM: Primary | ICD-10-CM

## 2019-11-19 NOTE — TELEPHONE ENCOUNTER
[facility] bilateral screening mammogram with 3D.  The breasts are extremely dense.  Bilateral calcifications are benign.  Asymmetry superior right breast.  BI-RADS 0  We will arrange for a right diagnostic mammogram and ultrasound and see her back after.

## 2019-11-20 LAB — CREAT BLDA-MCNC: 0.7 MG/DL (ref 0.6–1.3)

## 2019-11-21 ENCOUNTER — TELEPHONE (OUTPATIENT)
Dept: SURGERY | Facility: CLINIC | Age: 44
End: 2019-11-21

## 2019-11-21 NOTE — TELEPHONE ENCOUNTER
Scheduled Rt 3D diagnostic mammogram  And Rt Breast U/S @ [facility] 12-2-19  @ 10:15      Pt v/u with appts  kody

## 2019-11-22 ENCOUNTER — OFFICE VISIT (OUTPATIENT)
Dept: OBSTETRICS AND GYNECOLOGY | Age: 44
End: 2019-11-22

## 2019-11-22 VITALS
BODY MASS INDEX: 19 KG/M2 | DIASTOLIC BLOOD PRESSURE: 84 MMHG | SYSTOLIC BLOOD PRESSURE: 126 MMHG | HEIGHT: 63 IN | WEIGHT: 107.2 LBS

## 2019-11-22 DIAGNOSIS — N92.0 MENORRHAGIA WITH REGULAR CYCLE: ICD-10-CM

## 2019-11-22 DIAGNOSIS — Z01.419 WELL WOMAN EXAM WITH ROUTINE GYNECOLOGICAL EXAM: Primary | ICD-10-CM

## 2019-11-22 DIAGNOSIS — Z11.51 ENCOUNTER FOR SCREENING FOR HUMAN PAPILLOMAVIRUS (HPV): ICD-10-CM

## 2019-11-22 PROBLEM — Z91.89 INCREASED RISK OF BREAST CANCER: Status: ACTIVE | Noted: 2019-11-22

## 2019-11-22 PROCEDURE — 99396 PREV VISIT EST AGE 40-64: CPT | Performed by: OBSTETRICS & GYNECOLOGY

## 2019-11-22 NOTE — PROGRESS NOTES
Chief complaint: annual    Subjective   History of Present Illness    Cari Mathis is a 44 y.o.  who presents for annual exam. C/o irregular menses for last year.  Her menses are regular every 28-30 days, lasting 10-13 days. +menorrhagia. Occasional missed menses.   C/o some hair loss, recent TSH, CBC and other screening labs normal  Gyn US 2019 normal uterus, EML 3mm, small fibroid  Mother with breast cancer dx 32 () pt had MyRisk testing that is negative. She is followed by Dr Solis closely with MRIs and MMGs.     Obstetric History:  OB History      Para Term  AB Living    1 1 0 1   2    SAB TAB Ectopic Molar Multiple Live Births            1 2         Menstrual History:     Patient's last menstrual period was 2019 (exact date).         Current contraception: condoms  History of abnormal Pap smear: no  Mammogram: up to date.  Colonoscopy: not indicated.  DEXA: not indicated.    Exercise: moderately active  Calcium/Vitamin D: adequate intake    The following portions of the patient's history were reviewed and updated as appropriate: allergies, current medications, past family history, past medical history, past social history, past surgical history and problem list.    Review of Systems   Constitutional: Negative for activity change, fatigue, fever and unexpected weight change.   Respiratory: Negative for chest tightness and shortness of breath.    Cardiovascular: Negative for chest pain, palpitations and leg swelling.   Gastrointestinal: Negative for abdominal distention, abdominal pain, blood in stool, constipation, diarrhea, nausea and vomiting.   Endocrine: Negative for cold intolerance, heat intolerance, polydipsia, polyphagia and polyuria.   Genitourinary: Positive for menstrual problem.   Musculoskeletal: Negative for arthralgias and back pain.   Skin: Negative for color change.        Hair loss   Neurological: Negative for weakness and headaches.   Hematological: Does  "not bruise/bleed easily.   Psychiatric/Behavioral: Negative for confusion.       Pertinent items are noted in HPI.     Objective   Physical Exam    /84   Ht 160 cm (63\")   Wt 48.6 kg (107 lb 3.2 oz)   LMP 11/09/2019 (Exact Date)   Breastfeeding? No   BMI 18.99 kg/m²     General:   alert, appears stated age and cooperative   Neck: no asymmetry, masses, or scars   Heart: regular rate and rhythm, S1, S2 normal, no murmur, click, rub or gallop   Lungs: clear to auscultation bilaterally   Abdomen: soft, non-tender, without masses or organomegaly   Breast: inspection negative, no nipple discharge or bleeding, no masses or nodularity palpable   Vulva: normal, Bartholin's, Urethra, Brandenburg's normal   Vagina: normal mucosa   Cervix: no bleeding following Pap, no cervical motion tenderness, no lesions and nulliparous appearance   Uterus: normal size, anteverted, mobile, non-tender, normal shape and consistency   Adnexa: normal adnexa and no mass, fullness, tenderness   Rectal: not indicated     Assessment/Plan   Cari was seen today for gynecologic exam.    Diagnoses and all orders for this visit:    Well woman exam with routine gynecological exam  -     PapIG, HPV, Rfx 16 / 18    Encounter for screening for human papillomavirus (HPV)  -     PapIG, HPV, Rfx 16 / 18    Menorrhagia with regular cycle    recent gyn US noted a thin endometrium. Declines progesterone IUD, ablation or low dose ocps at this time. Will continue to observe.    Breast self exam technique reviewed and patient encouraged to perform self-exam monthly.  Discussed healthy lifestyle modifications.  Pap smear sent                 "

## 2019-11-26 LAB
CYTOLOGIST CVX/VAG CYTO: NORMAL
CYTOLOGY CVX/VAG DOC CYTO: NORMAL
CYTOLOGY CVX/VAG DOC THIN PREP: NORMAL
DX ICD CODE: NORMAL
HIV 1 & 2 AB SER-IMP: NORMAL
HPV I/H RISK 1 DNA CVX QL PROBE+SIG AMP: NEGATIVE
OTHER STN SPEC: NORMAL
STAT OF ADQ CVX/VAG CYTO-IMP: NORMAL

## 2019-11-27 ENCOUNTER — TELEPHONE (OUTPATIENT)
Dept: OBSTETRICS AND GYNECOLOGY | Age: 44
End: 2019-11-27

## 2019-12-02 ENCOUNTER — APPOINTMENT (OUTPATIENT)
Dept: WOMENS IMAGING | Facility: HOSPITAL | Age: 44
End: 2019-12-02

## 2019-12-02 PROCEDURE — 76641 ULTRASOUND BREAST COMPLETE: CPT | Performed by: RADIOLOGY

## 2019-12-02 PROCEDURE — 77065 DX MAMMO INCL CAD UNI: CPT | Performed by: RADIOLOGY

## 2019-12-02 PROCEDURE — 77061 BREAST TOMOSYNTHESIS UNI: CPT | Performed by: RADIOLOGY

## 2019-12-02 PROCEDURE — G0279 TOMOSYNTHESIS, MAMMO: HCPCS | Performed by: RADIOLOGY

## 2019-12-03 ENCOUNTER — TELEPHONE (OUTPATIENT)
Dept: SURGERY | Facility: CLINIC | Age: 44
End: 2019-12-03

## 2019-12-03 NOTE — TELEPHONE ENCOUNTER
[facility] right diagnostic mammogram with 3D.  The breast is extremely dense.  Superior asymmetry does not persist.  On ultrasound no suspicious mass calcifications or other abnormalities.  BI-RADS 1.    MRI November 18, 2019 bilateral breast MRI [facility].  No findings suspicious in either breast BI-RADS 2.

## 2019-12-06 ENCOUNTER — OFFICE VISIT (OUTPATIENT)
Dept: SURGERY | Facility: CLINIC | Age: 44
End: 2019-12-06

## 2019-12-06 VITALS
SYSTOLIC BLOOD PRESSURE: 128 MMHG | HEART RATE: 73 BPM | OXYGEN SATURATION: 99 % | BODY MASS INDEX: 19.31 KG/M2 | HEIGHT: 63 IN | WEIGHT: 109 LBS | DIASTOLIC BLOOD PRESSURE: 81 MMHG

## 2019-12-06 DIAGNOSIS — Z13.79 GENETIC SCREENING: ICD-10-CM

## 2019-12-06 DIAGNOSIS — Z80.3 FH: BREAST CANCER: Primary | ICD-10-CM

## 2019-12-06 DIAGNOSIS — R92.2 DENSE BREASTS: ICD-10-CM

## 2019-12-06 PROCEDURE — 99213 OFFICE O/P EST LOW 20 MIN: CPT | Performed by: SURGERY

## 2019-12-06 NOTE — PROGRESS NOTES
Chief Complaint: Cari Mathis is a 44 y.o. female who was seen in consultation at the request of No ref. provider found  for GENETIC EVALUATION, family history breast cancer and a followup visit    History of Present Illness:  Patient presents with management of breast cancer risk and family history breast cancer.   She noted no new masses, skin changes, nipple discharge, nipple changes prior to her most recent imaging.    Her most recent imaging includes the followin2017  [facility]  Mammo  Cari Mathis  MAMMO SCREENING  Heterogeneously dense. I see no new or dominant masses, areas of architectural distortion or skin thickening. There is no evidence for axillary distortion or skin thickening. There is no evidence for axillary lymphadenopathy or nipple retraction. Scattered punctate monomorphic calcifications are seen throughout both breasts.  BIRADS Category 2, Benign.    2018  [facility]  Mammo  Cari Mathis  Screening mammogram.  Extremely dense.  There are multiple stable milk of calcium and punctate calcifications with diffuse/scattered distribution seen in both breasts. This is a typically benign pattern/process. No suspicious masses, suspicious microcalcifications or new areas of architectural distortion are identified. There has been no significant change from the prior.  BIRADS Category 2, Benign.    She has not had a breast biopsy in the past.  She has her uterus and ovaries, is pre-perimenopausal, and takes nor hormones.  Her family history includes the following:   She has 2 daughters, 2 sisters, 1 MA, 1 PA. Her mother had breast cancer age 32, is  and did not have genetic testing. Her PGM had breast ca age 70s.    Interval HIstory:  In the interim,  Cari Mathis  has done well.  She has noted no changes in her breast exam. No new masses, skin changes, nipple changes, nipple discharge either breast.     Her most recent imaging includes the  following:  [facility] with [facility].  The breast is extremely dense.  Superior asymmetry does not persist.  On ultrasound no suspicious mass calcifications or other abnormalities.  BI-RADS 1.     MRI 2019 bilateral breast MRI .  No findings suspicious in either breast BI-RADS 2.    Review of Systems:  Review of Systems   Constitutional: Positive for unexpected weight change (4 lb wt gain ).   Endocrine: Negative for hot flashes.   All other systems reviewed and are negative.       Past Medical and Surgical History:  Breast Biopsy History:  Patient has not had a breast biopsy in the past.  Breast Cancer HIstory:  Patient does not have a past medical history of breast cancer.  Breast Operations, and year:  NONE   Obstetric/Gynecologic History:  Age menstrual periods began: 16  Patient is premenopausal, first day of last period: 16  Number of pregnancies: 1  Number of live births: 2  Number of abortions or miscarriages: 0  Age of delivery of first child: 33  Patient breast fed, for the following lenth of time: 3 MONTHS   Length of time taking birth control pills: 5 YRS   Patient has never taken hormone replacement  PATIENT HAS BOTH OVARIES AND UTERUS.     Past Surgical History:   Procedure Laterality Date   •  SECTION       Past Medical History:   Diagnosis Date   • Hypertension    • Kidney stones    • Migraines        Prior Hospitalizations, other than for surgery or childbirth, and year:  KIDNEY STONES     Social History     Socioeconomic History   • Marital status:      Spouse name: Not on file   • Number of children: Not on file   • Years of education: Not on file   • Highest education level: Not on file   Tobacco Use   • Smoking status: Never Smoker   • Smokeless tobacco: Never Used   Substance and Sexual Activity   • Alcohol use: No   • Drug use: No   • Sexual activity: Yes     Partners: Male     Birth  "control/protection: Condom     Patient is .  Patient is employed full time with the following occupation:   Patient drinks 2 servings of caffeine per day.    Family History:  Family History   Problem Relation Age of Onset   • Breast cancer Mother 32   • Cancer Mother 34        female cancer   • Hypertension Father    • No Known Problems Sister    • No Known Problems Brother    • No Known Problems Daughter    • No Known Problems Son    • No Known Problems Maternal Grandmother    • Melanoma Paternal Grandmother 70   • No Known Problems Maternal Aunt    • No Known Problems Paternal Aunt    • Breast cancer Paternal Aunt         p great aunt  unknown age    • BRCA 1/2 Neg Hx    • Colon cancer Neg Hx    • Endometrial cancer Neg Hx    • Ovarian cancer Neg Hx    • Uterine cancer Neg Hx        Vital Signs:  /81 (BP Location: Right arm, Patient Position: Sitting, Cuff Size: Adult)   Pulse 73   Ht 160 cm (63\")   Wt 49.4 kg (109 lb)   LMP 11/09/2019 (LMP Unknown)   SpO2 99%   Breastfeeding? No   BMI 19.31 kg/m²      Medications:    Current Outpatient Medications:   •  ferrous sulfate 325 (65 FE) MG tablet, Take 325 mg by mouth Daily With Breakfast., Disp: , Rfl:   •  metoprolol succinate XL (TOPROL-XL) 50 MG 24 hr tablet, TAKE ONE TABLET BY MOUTH TWICE A DAY, Disp: , Rfl:   •  zolpidem CR (AMBIEN CR) 12.5 MG CR tablet, Take 12.5 mg by mouth., Disp: , Rfl:   •  rizatriptan (MAXALT) 10 MG tablet, Take 10 mg by mouth., Disp: , Rfl:      Allergies:  Allergies   Allergen Reactions   • Nitrofurantoin Hives   • Ciprofloxacin-Hydrocortisone Hives   • Dilaudid [Hydromorphone Hcl] Hives       Physical Examination:  /81 (BP Location: Right arm, Patient Position: Sitting, Cuff Size: Adult)   Pulse 73   Ht 160 cm (63\")   Wt 49.4 kg (109 lb)   LMP 11/09/2019 (LMP Unknown)   SpO2 99%   Breastfeeding? No   BMI 19.31 kg/m²   General Appearance:  Patient is in no distress.  She is well kept and " has an thin build.   Psychiatric:  Patient with appropriate mood and affect. Alert and oriented to self, time, and place.    Breast, RIGHT:  small sized, 34B, symmetric with the contralateral side.  Breast skin is without erythema, edema, rashes.  There are no visible abnormalities upon inspection during the arm-raising maneuver or with hands on hips in the sitting position. There is no nipple retraction, discharge or nipple/areolar skin changes.There are no masses palpable in the sitting or supine positions.    Breast, LEFT:  small sized, 34B,  symmetric with the contralateral side.  Breast skin is without erythema, edema, rashes.  There are no visible abnormalities upon inspection during the arm-raising maneuver or with hands on hips in the sitting position. There is no nipple retraction, discharge or nipple/areolar skin changes.There are no masses palpable in the sitting or supine positions.    Lymphatic:  There is no axillary, cervical, infraclavicular, or supraclavicular adenopathy bilaterally.  Eyes:  Pupils are round and reactive to light.  Cardiovascular:  Heart rate and rhythm are regular.  Respiratory:  Lungs are clear bilaterally with no crackles or wheezes in any lung field.  Gastrointestinal:  Abdomen is soft, nondistended, and nontender.     Musculoskeletal:  Good strength in all 4 extremities.   There is good range of motion in both shoulders.    Skin:  No new skin lesions or rashes on the skin excluding the breast (see breast exam above).        Imagin2016  [facility]  Mammo  [facility]  Screening mammogram.  No change. No evidence of malignancy.  BIRADS 2, Negative.    2017  [facility]  Mammo  Cari Del  MAMMO SCREENING  Heterogeneously dense. I see no new or dominant masses, areas of architectural distortion or skin thickening. There is no evidence for axillary distortion or skin thickening. There is no evidence for axillary lymphadenopathy or nipple  retraction. Scattered punctate monomorphic calcifications are seen throughout both breasts.  BIRADS Category 2, Benign.    11/16/2018  [facility]  Mammo  [facility]  Screening mammogram.  Extremely dense.  There are multiple stable milk of calcium and punctate calcifications with diffuse/scattered distribution seen in both breasts. This is a typically benign pattern/process. No suspicious masses, suspicious microcalcifications or new areas of architectural distortion are identified. There has been no significant change from the prior.  BIRADS Category 2, Benign.    [facility] December 2, 2019 right diagnostic mammogram with 3D.  The breast is extremely dense.  Superior asymmetry does not persist.  On ultrasound no suspicious mass calcifications or other abnormalities.  BI-RADS 1.     MRI November 18, 2019 bilateral breast MRI [facility].  No findings suspicious in either breast BI-RADS 2.    [facility] December 2, 2019 right diagnostic mammogram with 3D.  The breast is extremely dense.  Superior asymmetry does not persist.  On ultrasound no suspicious mass calcifications or other abnormalities.  BI-RADS 1.     MRI November 18, 2019 bilateral breast MRI [facility].  No findings suspicious in either breast BI-RADS 2.    Labs:    09/30/2019  [facility]   [facility]  BRCA1 and BRCA2 Aynalysis  BRCA2 - c.4747A>T; UNCERTAIN CLINICAL SIGNIFICANCE      [employer] Hereditary Cancer Screen  05/17/2019  [facility]   [facility]  GENETIC RESULT: NEGATIVE - NO CLINICALLY SIGNIFICANT MUTATION IDENTIFIED  ADDITIONAL FINDINGS:  GENE    VARIANT(S) OF UNCERTAIN SIGNIFICANCE  BRCA2    c.4747A>T  MLH1    c. 896G>A          Procedures:      Assessment:   Diagnosis Plan   1. FH: breast cancer  MRI Breast Bilateral With & Without Contrast    Mammo Screening Digital Tomosynthesis Bilateral With CAD   2. Dense breasts     3.  Genetic screening          1-  Mother age 32, , did not have genetic testing  PGM age 70s    2-  Extremely dense breast tissue on mammography    3-  My Risk- 5-  -VUS in BRCA2 c.4747A>T  -VUS in MLH1 c.896G>A    Plan:  Cari her  Ian and I reviewed her imaging, interval history, imaging results,exam, and family history together today.   Her diagnostic imaging was in good order.  Her MRI is in good order.  We will keep her in mammographic and MRI surveillance with her next mammogram and MRI due 2020 with mammography [facility] and MRI at [facility].  I asked her to continue her self breast exam and to call us in the interim with concerns would be happy to see her back sooner.    Previously we discussed her risk assessment as below:    Her most significant risk factors for breast cancer include the following: mother having had breast cancer, premenopausal  I calculated her lifetime risk of breast cancer using the CRA risk assessment (5 models)  As:8-25%  I calculated her lifetime risk of breast cancer using the Deisi model (not useful for women under the age of 35 years) as: 1.3%  With a lifetime risk of breast cancer greater than 20%, the current recommendations for screening include annual mammography and annual MRI, with clinical examination.     We also discussed that for a Deisi model 5 year risk greater than 1.7% or LCIS, and a life expectancy > 10 years, that we recommend risk reduction counseling with the medical oncologists about chemopreventive agents such as tamoxifen, raloxifene, or exemestane.  SHe was not interested in that consultation and her Deisi risk was under 1.7%.    We previously discussed and reviewed today the variant of uncertain significance detected in the my risk panel.  We discussed that there is no action needed for a variant of uncertain significance but it would be prudent to check in with a company on interval basis to see if there have  been any updates on her variants        Caitie Solis MD    15 min visit, 10 face to face     Next Appointment:  Return for Next scheduled follow up, after imaging.      EMR Dragon/transcription disclaimer:    Much of this encounter note is an electronic transcription/translocation of spoken language to printed text.  The electronic translation of spoken language may permit erroneous, or at times, nonsensical words or phrases to be inadvertently transcribed.  Although I have reviewed the note from such areas, some may still exist.

## 2019-12-09 ENCOUNTER — TELEPHONE (OUTPATIENT)
Dept: SURGERY | Facility: CLINIC | Age: 44
End: 2019-12-09

## 2019-12-09 NOTE — TELEPHONE ENCOUNTER
Scheduled bilateral breast screen mammo and bilateral breast mri w wo contrast @ [facility]  11-20-20     Arrive 11:15    Return to see Dr KING 12-7-20  Arrive 11:45    Spoke to pt she v/u with appts  kody

## 2020-11-20 ENCOUNTER — HOSPITAL ENCOUNTER (OUTPATIENT)
Dept: MRI IMAGING | Facility: HOSPITAL | Age: 45
Discharge: HOME | End: 2020-11-20

## 2020-11-20 ENCOUNTER — HOSPITAL ENCOUNTER (OUTPATIENT)
Dept: MAMMOGRAPHY | Facility: HOSPITAL | Age: 45
Discharge: HOME | End: 2020-11-20

## 2020-11-20 DIAGNOSIS — Z80.3 FH: BREAST CANCER: ICD-10-CM

## 2020-11-20 PROCEDURE — A9577 INJ MULTIHANCE: HCPCS | Performed by: SURGERY

## 2020-11-20 PROCEDURE — 77067 SCR MAMMO BI INCL CAD: CPT

## 2020-11-20 PROCEDURE — 0 GADOBENATE DIMEGLUMINE 529 MG/ML SOLUTION: Performed by: SURGERY

## 2020-11-20 PROCEDURE — 77063 BREAST TOMOSYNTHESIS BI: CPT

## 2020-11-20 PROCEDURE — 77049 MRI BREAST C-+ W/CAD BI: CPT

## 2020-11-20 RX ADMIN — GADOBENATE DIMEGLUMINE 9 ML: 529 INJECTION, SOLUTION INTRAVENOUS at 12:26

## 2020-11-23 ENCOUNTER — TELEPHONE (OUTPATIENT)
Dept: SURGERY | Facility: CLINIC | Age: 45
End: 2020-11-23

## 2020-11-23 NOTE — TELEPHONE ENCOUNTER
Bilateral breast MRI November 20, 2020: [facility] right breast no suspicious enhancing mass, left breast no suspicious enhancing mass.  BI-RADS 1  [facility] bilateral screening mammogram with tomosynthesis November 20, 2020: The breasts are heterogenous [place] dense.  BI-RADS 2

## 2020-12-01 ENCOUNTER — OFFICE VISIT (OUTPATIENT)
Dept: SURGERY | Facility: CLINIC | Age: 45
End: 2020-12-01

## 2020-12-01 VITALS
HEIGHT: 63 IN | OXYGEN SATURATION: 99 % | WEIGHT: 109.4 LBS | BODY MASS INDEX: 19.38 KG/M2 | SYSTOLIC BLOOD PRESSURE: 117 MMHG | DIASTOLIC BLOOD PRESSURE: 78 MMHG | TEMPERATURE: 98.4 F | HEART RATE: 88 BPM

## 2020-12-01 DIAGNOSIS — Z13.79 GENETIC SCREENING: ICD-10-CM

## 2020-12-01 DIAGNOSIS — R92.2 DENSE BREASTS: ICD-10-CM

## 2020-12-01 DIAGNOSIS — Z80.3 FH: BREAST CANCER: Primary | ICD-10-CM

## 2020-12-01 PROCEDURE — 99212 OFFICE O/P EST SF 10 MIN: CPT | Performed by: SURGERY

## 2020-12-01 RX ORDER — CEFDINIR 300 MG/1
CAPSULE ORAL
COMMUNITY
Start: 2020-09-15 | End: 2020-12-04

## 2020-12-01 NOTE — PROGRESS NOTES
Chief Complaint: Cari Mathis is a 45 y.o. female who was seen in consultation at the request of No ref. provider found  for GENETIC EVALUATION, family history breast cancer and a followup visit    History of Present Illness:  Patient presents with management of breast cancer risk and family history breast cancer.   She noted no new masses, skin changes, nipple discharge, nipple changes prior to her most recent imaging.    Her most recent imaging includes the followin2017  [facility]  Mammo  [facility]  MAMMO SCREENING  Heterogeneously dense. I see no new or dominant masses, areas of architectural distortion or skin thickening. There is no evidence for axillary distortion or skin thickening. There is no evidence for axillary lymphadenopathy or nipple retraction. Scattered punctate monomorphic calcifications are seen throughout both breasts.  BIRADS Category 2, Benign.    2018  [facility]  [facility] [place]  Screening mammogram.  Extremely dense.  There are multiple stable milk of calcium and punctate calcifications with diffuse/scattered distribution seen in both breasts. This is a typically benign pattern/process. No suspicious masses, suspicious microcalcifications or new areas of architectural distortion are identified. There has been no significant change from the prior.  BIRADS Category 2, Benign.    She has not had a breast biopsy in the past.  She has her uterus and ovaries, is pre-perimenopausal, and takes nor hormones.  Her family history includes the following:   She has 2 daughters, 2 sisters, 1 MA, 1 PA. Her mother had breast cancer age 32, is  and did not have genetic testing. Her PGM had breast ca age 70s.      In the interim,  Cari Mathis  has done well.  She has noted no changes in her breast exam. No new masses, skin changes, nipple changes, nipple discharge either breast.     Her most recent imaging includes the following:  [facility] with [facility]D.  The breast is extremely dense.  Superior asymmetry does not persist.  On ultrasound no suspicious mass calcifications or other abnormalities.  BI-RADS 1.     MRI 2019 bilateral breast MRI [facility].  No findings suspicious in either breast BI-RADS 2.    Interval HIstory:    In the interim,  Cari Mathis  has done well.  She has noted no changes in her breast exam. No new masses, skin changes, nipple changes, nipple discharge either breast.   She denies headache, bone pain, belly pain, cough, changes in vision or gait.    Her most recent imaging includes the following:  Bilateral breast MRI 2020: [facility] right breast no suspicious enhancing mass, left breast no suspicious enhancing mass.  BI-RADS 1  [facility] bilateral screening mammogram with tomosynthesis 2020: The breasts are heterogenous Loy dense.  BI-RADS 2    She is here to review.        Review of Systems:  Review of Systems   Endocrine: Negative for hot flashes.   All other systems reviewed and are negative.       Past Medical and Surgical History:  Breast Biopsy History:  Patient has not had a breast biopsy in the past.  Breast Cancer HIstory:  Patient does not have a past medical history of breast cancer.  Breast Operations, and year:  NONE   Obstetric/Gynecologic History:  Age menstrual periods began: 16  Patient is premenopausal, first day of last period: 16  Number of pregnancies: 1  Number of live births: 2  Number of abortions or miscarriages: 0  Age of delivery of first child: 33  Patient breast fed, for the following lenth of time: 3 MONTHS   Length of time taking birth control pills: 5 YRS   Patient has never taken hormone replacement  PATIENT HAS BOTH OVARIES AND UTERUS.     Past Surgical History:   Procedure Laterality Date   •  SECTION       Past Medical History:   Diagnosis Date  "  • Hypertension    • Kidney stones    • Migraines        Prior Hospitalizations, other than for surgery or childbirth, and year:  KIDNEY STONES     Social History     Socioeconomic History   • Marital status:      Spouse name: Not on file   • Number of children: Not on file   • Years of education: Not on file   • Highest education level: Not on file   Tobacco Use   • Smoking status: Never Smoker   • Smokeless tobacco: Never Used   Substance and Sexual Activity   • Alcohol use: No   • Drug use: No   • Sexual activity: Yes     Partners: Male     Birth control/protection: Condom     Patient is .  Patient is employed full time with the following occupation:   Patient drinks 2 servings of caffeine per day.    Family History:  Family History   Problem Relation Age of Onset   • Breast cancer Mother 32   • Cancer Mother 34        female cancer   • Hypertension Father    • No Known Problems Sister    • No Known Problems Brother    • No Known Problems Daughter    • No Known Problems Son    • No Known Problems Maternal Grandmother    • Melanoma Paternal Grandmother 70   • No Known Problems Maternal Aunt    • No Known Problems Paternal Aunt    • Breast cancer Paternal Aunt         p great aunt  unknown age    • BRCA 1/2 Neg Hx    • Colon cancer Neg Hx    • Endometrial cancer Neg Hx    • Ovarian cancer Neg Hx    • Uterine cancer Neg Hx        Vital Signs:  /78   Pulse 88   Temp 98.4 °F (36.9 °C)   Ht 160 cm (63\")   Wt 49.6 kg (109 lb 6.4 oz)   LMP  (LMP Unknown)   SpO2 99%   Breastfeeding No   BMI 19.38 kg/m²      Medications:    Current Outpatient Medications:   •  cefdinir (OMNICEF) 300 MG capsule, , Disp: , Rfl:   •  ferrous sulfate 325 (65 FE) MG tablet, Take 325 mg by mouth Daily With Breakfast., Disp: , Rfl:   •  metoprolol succinate XL (TOPROL-XL) 50 MG 24 hr tablet, TAKE ONE TABLET BY MOUTH TWICE A DAY, Disp: , Rfl:   •  zolpidem CR (AMBIEN CR) 12.5 MG CR tablet, Take 12.5 " "mg by mouth., Disp: , Rfl:   •  rizatriptan (MAXALT) 10 MG tablet, Take 10 mg by mouth., Disp: , Rfl:      Allergies:  Allergies   Allergen Reactions   • Nitrofurantoin Hives   • Ciprofloxacin-Hydrocortisone Hives   • Dilaudid [Hydromorphone Hcl] Hives       Physical Examination:  /78   Pulse 88   Temp 98.4 °F (36.9 °C)   Ht 160 cm (63\")   Wt 49.6 kg (109 lb 6.4 oz)   LMP  (LMP Unknown)   SpO2 99%   Breastfeeding No   BMI 19.38 kg/m²   General Appearance:  Patient is in no distress.  She is well kept and has an thin build.   Psychiatric:  Patient with appropriate mood and affect. Alert and oriented to self, time, and place.    Breast, RIGHT:  small sized, 34B, symmetric with the contralateral side.  Breast skin is without erythema, edema, rashes.  There are no visible abnormalities upon inspection during the arm-raising maneuver or with hands on hips in the sitting position. There is no nipple retraction, discharge or nipple/areolar skin changes.There are no masses palpable in the sitting or supine positions.    Breast, LEFT:  small sized, 34B,  symmetric with the contralateral side.  Breast skin is without erythema, edema, rashes.  There are no visible abnormalities upon inspection during the arm-raising maneuver or with hands on hips in the sitting position. There is no nipple retraction, discharge or nipple/areolar skin changes.There are no masses palpable in the sitting or supine positions.    Lymphatic:  There is no axillary, cervical, infraclavicular, or supraclavicular adenopathy bilaterally.  Eyes:  Pupils are round and reactive to light.  Cardiovascular:  Heart rate and rhythm are regular.  Respiratory:  Lungs are clear bilaterally with no crackles or wheezes in any lung field.  Gastrointestinal:  Abdomen is soft, nondistended, and nontender.     Musculoskeletal:  Good strength in all 4 extremities.   There is good range of motion in both shoulders.    Skin:  No new skin lesions or rashes on " the skin excluding the breast (see breast exam above).        Imagin2016  [facility]  Mammo  [facility]  Screening mammogram.  No change. No evidence of malignancy.  BIRADS 2, Negative.    2017  [facility]  MAMMO SCREENING  Heterogeneously dense. I see no new or dominant masses, areas of architectural distortion or skin thickening. There is no evidence for axillary distortion or skin thickening. There is no evidence for axillary lymphadenopathy or nipple retraction. Scattered punctate monomorphic calcifications are seen throughout both breasts.  BIRADS Category 2, Benign.    2018  [facility]  Screening mammogram.  Extremely dense.  There are multiple stable milk of calcium and punctate calcifications with diffuse/scattered distribution seen in both breasts. This is a typically benign pattern/process. No suspicious masses, suspicious microcalcifications or new areas of architectural distortion are identified. There has been no significant change from the prior.  BIRADS Category 2, Benign.    [facility] 2019 right diagnostic mammogram with 3D.  The breast is extremely dense.  Superior asymmetry does not persist.  On ultrasound no suspicious mass calcifications or other abnormalities.  BI-RADS 1.     MRI 2019 bilateral breast MRI [facility].  No findings suspicious in either breast BI-RADS 2.    [facility] 2019 right diagnostic mammogram with 3D.  The breast is extremely dense.  Superior asymmetry does not persist.  On ultrasound no suspicious mass calcifications or other abnormalities.  BI-RADS 1.     MRI 2019 bilateral breast MRI [facility].  No findings suspicious in either breast BI-RADS 2.      Bilateral breast MRI 2020: [facility] right breast no suspicious enhancing  mass, left breast no suspicious enhancing mass.  BI-RADS 1  [facility] bilateral screening mammogram with tomosynthesis 2020: The breasts are heterogenous Loy dense.  BI-RADS 2    Labs:    2019  [facility]  Genetic   Cari Mathis  BRCA1 and BRCA2 Aynalysis  BRCA2 - c.4747A>T; UNCERTAIN CLINICAL SIGNIFICANCE      [facility]  2019  [facility]    Genetic   Cari Mathis  GENETIC RESULT: NEGATIVE - NO CLINICALLY SIGNIFICANT MUTATION IDENTIFIED  ADDITIONAL FINDINGS:  GENE    VARIANT(S) OF UNCERTAIN SIGNIFICANCE  BRCA2    c.4747A>T  MLH1    c. 896G>A          Procedures:      Assessment:   Diagnosis Plan   1. FH: breast cancer     2. Dense breasts     3. Genetic screening          1-  Mother age 32, , did not have genetic testing  PGM age 70s    2-  Extremely dense breast tissue on mammography    3-  My Risk-   -VUS in BRCA2 c.4747A>T  -VUS in MLH1 c.896G>A    Plan:  Cari and her  Ian are neighbors in [facility].    She and I reviewed her imaging, interval history, imaging results, and exam together today.     Both imaging and exam are in good order.    She wishes to continue with MRI and mammographic surveillance together once annually.  We discussed the other option of q 6 mo imaging.    We reviewed the option of talking with medical oncology about risk reducing medications and she is certain that she is not interested in this consultation.      Previously we discussed her risk assessment as below:    Her most significant risk factors for breast cancer include the following: mother having had breast cancer, premenopausal  I calculated her lifetime risk of breast cancer using the CRA risk assessment (5 models)  As:8-25%  I calculated her lifetime risk of breast cancer using the Deisi model (not useful for women under the age of 35 years) as: 1.3%  With a lifetime risk of breast cancer greater than 20%, the current  recommendations for screening include annual mammography and annual MRI, with clinical examination.     We also discussed that for a Deisi model 5 year risk greater than 1.7% or LCIS, and a life expectancy > 10 years, that we recommend risk reduction counseling with the medical oncologists about chemopreventive agents such as tamoxifen, raloxifene, or exemestane.       We previously discussed and reviewed today the variant of uncertain significance detected in the my risk panel.  We discussed that there is no action needed for a variant of uncertain significance but it would be prudent to check in with a company on interval basis to see if there have been any updates on her variants      Her next routine imaging will be due November 21, 2021 at [facility].  I will arrange these and she will see Trudy Falcon our nurse practitioner back thereafter.  I asked her to continue her self breast exam monthly in addition to a clinical breast exam annually and her imaging annually.  I asked her to call in the interim with concerns would be happy to see her back sooner.      Caitie Solis MD      Next Appointment:  Return for Next scheduled follow up, after imaging, with Trudy Falcon.      EMR Dragon/transcription disclaimer:    Much of this encounter note is an electronic transcription/translocation of spoken language to printed text.  The electronic translation of spoken language may permit erroneous, or at times, nonsensical words or phrases to be inadvertently transcribed.  Although I have reviewed the note from such areas, some may still exist.

## 2020-12-03 ENCOUNTER — TELEPHONE (OUTPATIENT)
Dept: SURGERY | Facility: CLINIC | Age: 45
End: 2020-12-03

## 2020-12-03 NOTE — TELEPHONE ENCOUNTER
Scheduled Bilateral Breast 3D Mammo  And Bilateral Breast MRI at [facility] 11-22-21  At 11:00      Return to see NP Trudy Falcon  11-29-21 arrive 11:15    Spoke to pt dutch v/ghassan Vasquez

## 2020-12-04 ENCOUNTER — OFFICE VISIT (OUTPATIENT)
Dept: OBSTETRICS AND GYNECOLOGY | Age: 45
End: 2020-12-04

## 2020-12-04 VITALS
WEIGHT: 108.6 LBS | DIASTOLIC BLOOD PRESSURE: 84 MMHG | HEIGHT: 63 IN | SYSTOLIC BLOOD PRESSURE: 146 MMHG | BODY MASS INDEX: 19.24 KG/M2

## 2020-12-04 DIAGNOSIS — Z01.419 WELL WOMAN EXAM WITH ROUTINE GYNECOLOGICAL EXAM: Primary | ICD-10-CM

## 2020-12-04 PROBLEM — Z13.71 BRCA NEGATIVE: Status: ACTIVE | Noted: 2020-12-04

## 2020-12-04 PROCEDURE — 99396 PREV VISIT EST AGE 40-64: CPT | Performed by: OBSTETRICS & GYNECOLOGY

## 2020-12-04 NOTE — PROGRESS NOTES
"Chief complaint: annual    Subjective   History of Present Illness    Cari Mathis is a 45 y.o.  who presents for annual exam. No gyn c/o. Menses have become lighter.   Her menses are regular every 28-30 days, lasting 4-5 days, some cramping before menses.  Declines contraception  Sees Dr Solis due to increased risk of breast cancer (mom dx 30s) pt is BRCA negative, has yearly MMG and MRI  2019 pap normal, HPV-  Has had colonoscopy for medical issues several years ago, no polyps     Obstetric History:  OB History        1    Para   1    Term   0       1    AB        Living   2       SAB        TAB        Ectopic        Molar        Multiple   1    Live Births   2               Menstrual History:     Patient's last menstrual period was 2020 (exact date).         Current contraception: none  History of abnormal Pap smear: no  Received Gardasil immunization: no  Perform regular self breast exam: yes -    Family history of uterine or ovarian cancer: no  Family History of colon cancer: no  Family history of breast cancer: yes -      Mammogram: up to date.  Colonoscopy: up to date.  DEXA: not indicated.    Exercise: moderately active  Calcium/Vitamin D: adequate intake    The following portions of the patient's history were reviewed and updated as appropriate: allergies, current medications, past family history, past medical history, past social history, past surgical history and problem list.    Review of Systems   Constitutional: Negative.    Respiratory: Negative.    Cardiovascular: Negative.    Gastrointestinal: Negative.    Genitourinary:        Cramping before menses   Neurological: Positive for headaches.       Pertinent items are noted in HPI.     Objective   Physical Exam    /84   Ht 160 cm (63\")   Wt 49.3 kg (108 lb 9.6 oz)   LMP 2020 (Exact Date)   Breastfeeding No   BMI 19.24 kg/m²     General:   alert, appears stated age and cooperative   Neck: no asymmetry, " masses, or scars   Heart: regular rate and rhythm, S1, S2 normal, no murmur, click, rub or gallop   Lungs: clear to auscultation bilaterally   Abdomen: soft, non-tender, without masses or organomegaly   Breast: inspection negative, no nipple discharge or bleeding, no masses or nodularity palpable   Vulva: normal, Bartholin's, Urethra, Elberta's normal   Vagina: normal mucosa   Cervix: multiparous appearance   Uterus: normal size, anteverted, mobile, non-tender, normal shape and consistency   Adnexa: normal adnexa and no mass, fullness, tenderness   Rectal: not indicated     Assessment/Plan   Diagnoses and all orders for this visit:    1. Well woman exam with routine gynecological exam (Primary)    continue to follow w/ breast surgery    Sees primary MD and has had screening    Breast self exam technique reviewed and patient encouraged to perform self-exam monthly.  Discussed healthy lifestyle modifications.  Pap smear up to date

## 2021-04-06 ENCOUNTER — BULK ORDERING (OUTPATIENT)
Dept: CASE MANAGEMENT | Facility: OTHER | Age: 46
End: 2021-04-06

## 2021-04-06 DIAGNOSIS — Z23 IMMUNIZATION DUE: ICD-10-CM

## 2021-10-11 ENCOUNTER — TELEPHONE (OUTPATIENT)
Dept: SURGERY | Facility: CLINIC | Age: 46
End: 2021-10-11

## 2021-10-11 DIAGNOSIS — Z91.89 INCREASED RISK OF BREAST CANCER: Primary | ICD-10-CM

## 2021-10-11 NOTE — TELEPHONE ENCOUNTER
Screening MGM at [facility] 11/8/21 @ 4:00.   MRI at [facility] 11/29/21 @ 9:45. Appt. With Trudy is the same day as MRI.

## 2021-10-14 ENCOUNTER — OFFICE VISIT (OUTPATIENT)
Dept: OBSTETRICS AND GYNECOLOGY | Age: 46
End: 2021-10-14

## 2021-10-14 VITALS
WEIGHT: 108 LBS | HEIGHT: 63 IN | SYSTOLIC BLOOD PRESSURE: 112 MMHG | BODY MASS INDEX: 19.14 KG/M2 | DIASTOLIC BLOOD PRESSURE: 64 MMHG

## 2021-10-14 DIAGNOSIS — Z13.89 SCREENING FOR BLOOD OR PROTEIN IN URINE: ICD-10-CM

## 2021-10-14 DIAGNOSIS — N39.0 RECURRENT UTI (URINARY TRACT INFECTION): Primary | ICD-10-CM

## 2021-10-14 DIAGNOSIS — N95.1 MENOPAUSAL SYMPTOMS: ICD-10-CM

## 2021-10-14 LAB
BILIRUB BLD-MCNC: NEGATIVE MG/DL
CLARITY, POC: CLEAR
COLOR UR: YELLOW
GLUCOSE UR STRIP-MCNC: NEGATIVE MG/DL
KETONES UR QL: NEGATIVE
LEUKOCYTE EST, POC: NEGATIVE
NITRITE UR-MCNC: NEGATIVE MG/ML
PH UR: 5.5 [PH] (ref 5–8)
PROT UR STRIP-MCNC: NEGATIVE MG/DL
RBC # UR STRIP: NEGATIVE /UL
SP GR UR: 1.03 (ref 1–1.03)
UROBILINOGEN UR QL: NORMAL

## 2021-10-14 PROCEDURE — 81002 URINALYSIS NONAUTO W/O SCOPE: CPT | Performed by: NURSE PRACTITIONER

## 2021-10-14 PROCEDURE — 99213 OFFICE O/P EST LOW 20 MIN: CPT | Performed by: NURSE PRACTITIONER

## 2021-10-14 NOTE — PROGRESS NOTES
"Subjective     Chief Complaint   Patient presents with   • Gynecologic Exam     Recurring UTI's discuss sx of perimenopause       Cari Mathis is a 46 y.o.  whose LMP is No LMP recorded (lmp unknown).     Pt presents today with chief complaint of recurrent UTI's  She has had 3 culture proven UTI's in the past year  She goes to [facility] for this  She is  but states they dont have IC often  UTI's have not been associated with IC  She has normal formed BM   She does not wear thong underwear     She would also like to discuss symptoms of perimenopause  Having some HF and waking up really hot  Her LMP was august  She was having regular periods before but seem to be heavier but not lasting as long and now has not had a period for 2 months   She is not a good candidate for estrogen due to HTN, migraines and mother with breast cancer at age 32    She has appt next month with PCP for all health maintenance labs      No Additional Complaints Reported    The following portions of the patient's history were reviewed and updated as appropriate:vital signs, allergies, current medications, past medical history, past social history, past surgical history and problem list      Review of Systems   Genitourinary:Recurrent UTI's, abnormal menstrual periods  Endocrine: positive for temperature intolerance     Objective      /64   Ht 160 cm (63\")   Wt 49 kg (108 lb)   LMP  (LMP Unknown)   BMI 19.13 kg/m²     Physical Exam    General:   alert and no distress   Heart: Not performed today   Lungs: Not performed today.   Breast: Not performed today   Neck: Not performed today   Abdomen: {Not performed today   CVA: Not performed today   Pelvis: External genitalia: normal general appearance  Urinary system: urethral meatus normal  Vaginal: normal mucosa without prolapse or lesions, normal without tenderness, induration or masses and normal rugae  Cervix: normal appearance   Extremities: Not performed today   Neurologic: " negative   Psychiatric: Normal affect, judgement, and mood       Lab Review   Labs: U/A - negative      Imaging   No data reviewed    Assessment/Plan     ASSESSMENT  1. Recurrent UTI (urinary tract infection)    2. Screening for blood or protein in urine    3. Menopausal symptoms        PLAN  1.   Orders Placed This Encounter   Procedures   • Follicle Stimulating Hormone   • Estradiol   • Ambulatory Referral to Urology   • POC Urinalysis Dipstick       2. Medications prescribed this encounter:      No orders of the defined types were placed in this encounter.      3. U/A negative. Referral to urology for recurrent UTI's. Will check FSH and estradiol level today. Discussed trying paxil for HF if worsen. Declines anything today. Planning  labs with PCP next month.     Follow up: PRN and for annual exam     SNOW Rosales  10/14/2021

## 2021-10-15 LAB
ESTRADIOL SERPL-MCNC: 104 PG/ML
FSH SERPL-ACNC: 58.1 MIU/ML

## 2021-11-08 ENCOUNTER — APPOINTMENT (OUTPATIENT)
Dept: WOMENS IMAGING | Facility: HOSPITAL | Age: 46
End: 2021-11-08

## 2021-11-08 PROCEDURE — 77063 BREAST TOMOSYNTHESIS BI: CPT | Performed by: RADIOLOGY

## 2021-11-08 PROCEDURE — 77067 SCR MAMMO BI INCL CAD: CPT | Performed by: RADIOLOGY

## 2021-11-22 ENCOUNTER — APPOINTMENT (OUTPATIENT)
Dept: MAMMOGRAPHY | Facility: HOSPITAL | Age: 46
End: 2021-11-22

## 2021-11-22 ENCOUNTER — APPOINTMENT (OUTPATIENT)
Dept: MRI IMAGING | Facility: HOSPITAL | Age: 46
End: 2021-11-22

## 2021-11-29 ENCOUNTER — TELEPHONE (OUTPATIENT)
Dept: SURGERY | Facility: CLINIC | Age: 46
End: 2021-11-29

## 2021-11-29 ENCOUNTER — HOSPITAL ENCOUNTER (OUTPATIENT)
Dept: MRI IMAGING | Facility: HOSPITAL | Age: 46
Discharge: HOME | End: 2021-11-29
Admitting: SURGERY

## 2021-11-29 ENCOUNTER — OFFICE VISIT (OUTPATIENT)
Dept: SURGERY | Facility: CLINIC | Age: 46
End: 2021-11-29

## 2021-11-29 VITALS
BODY MASS INDEX: 19.14 KG/M2 | DIASTOLIC BLOOD PRESSURE: 84 MMHG | HEIGHT: 63 IN | SYSTOLIC BLOOD PRESSURE: 123 MMHG | WEIGHT: 108 LBS

## 2021-11-29 DIAGNOSIS — R92.2 BREAST DENSITY: ICD-10-CM

## 2021-11-29 DIAGNOSIS — Z80.3 FH: BREAST CANCER: ICD-10-CM

## 2021-11-29 DIAGNOSIS — N60.12 FIBROCYSTIC BREAST CHANGES, BILATERAL: Primary | ICD-10-CM

## 2021-11-29 DIAGNOSIS — N60.11 FIBROCYSTIC BREAST CHANGES, BILATERAL: Primary | ICD-10-CM

## 2021-11-29 DIAGNOSIS — Z91.89 INCREASED RISK OF BREAST CANCER: ICD-10-CM

## 2021-11-29 PROBLEM — R92.30 BREAST DENSITY: Status: ACTIVE | Noted: 2021-11-29

## 2021-11-29 LAB — CREAT BLDA-MCNC: 0.7 MG/DL (ref 0.6–1.3)

## 2021-11-29 PROCEDURE — 99214 OFFICE O/P EST MOD 30 MIN: CPT | Performed by: NURSE PRACTITIONER

## 2021-11-29 PROCEDURE — 0 GADOBENATE DIMEGLUMINE 529 MG/ML SOLUTION: Performed by: SURGERY

## 2021-11-29 PROCEDURE — 77049 MRI BREAST C-+ W/CAD BI: CPT

## 2021-11-29 PROCEDURE — 82565 ASSAY OF CREATININE: CPT

## 2021-11-29 PROCEDURE — A9577 INJ MULTIHANCE: HCPCS | Performed by: SURGERY

## 2021-11-29 RX ORDER — CEPHALEXIN 250 MG/1
TABLET ORAL
COMMUNITY
Start: 2021-11-17 | End: 2023-01-24

## 2021-11-29 RX ADMIN — GADOBENATE DIMEGLUMINE 10 ML: 529 INJECTION, SOLUTION INTRAVENOUS at 10:01

## 2021-11-29 NOTE — PROGRESS NOTES
Chief Complaint: Cari Mathis is a 46 y.o. female who was seen in consultation at the request of No ref. provider found  for GENETIC EVALUATION, family history breast cancer and a followup visit    History of Present Illness:  19: seen by Dr Solis  Patient presents with management of breast cancer risk and family history breast cancer.   She noted no new masses, skin changes, nipple discharge, nipple changes prior to her most recent imaging.    Her most recent imaging includes the followin2017  [facility]  Mammo  Cari Galeton  MAMMO SCREENING  Heterogeneously dense. I see no new or dominant masses, areas of architectural distortion or skin thickening. There is no evidence for axillary distortion or skin thickening. There is no evidence for axillary lymphadenopathy or nipple retraction. Scattered punctate monomorphic calcifications are seen throughout both breasts.  BIRADS Category 2, Benign.    2018  [facility]  Cari Mathis  Screening mammogram.  Extremely dense.  There are multiple stable milk of calcium and punctate calcifications with diffuse/scattered distribution seen in both breasts. This is a typically benign pattern/process. No suspicious masses, suspicious microcalcifications or new areas of architectural distortion are identified. There has been no significant change from the prior.  BIRADS Category 2, Benign.    She has not had a breast biopsy in the past.  She has her uterus and ovaries, is pre-perimenopausal, and takes nor hormones.  Her family history includes the following:   She has 2 daughters, 2 sisters, 1 MA, 1 PA. Her mother had breast cancer age 32, is  and did not have genetic testing. Her PGM had breast ca age 70s.    2019:  In the interim,  Cari Mathis  has done well.  She has noted no changes in her breast exam. No new masses, skin changes, nipple changes, nipple discharge either breast.     Her most recent imaging  includes the following:  [facility] with [facility]D.  The breast is extremely dense.  Superior asymmetry does not persist.  On ultrasound no suspicious mass calcifications or other abnormalities.  BI-RADS 1.     MRI November 18, 2019 bilateral breast MRI [facility].  No findings suspicious in either breast BI-RADS 2.    12/1/2020:   In the interim,  Cari Mathis  has done well.  She has noted no changes in her breast exam. No new masses, skin changes, nipple changes, nipple discharge either breast.   She denies headache, bone pain, belly pain, cough, changes in vision or gait.    Her most recent imaging includes the following:  Bilateral breast MRI November 20, 2020: [facility] right breast no suspicious enhancing mass, left breast no suspicious enhancing mass.  BI-RADS 1  [facility] bilateral screening mammogram with tomosynthesis November 20, 2020: The breasts are heterogenous Loy dense.  BI-RADS 2    She is here to review.    11/29/21, Interval HIstory:  She returns today for follow up with no breast changes other than tenderness that is c/w hormone changes.  Her menses have become irregular.    Screening with tomosynthesis at [facility] on 11/8/21 was stable, BiRAds 2. (see full report below)    Breast MRI was completed today, 11/29/21, results pending.      Review of Systems:  Review of Systems   Endocrine: Negative for hot flashes.   All other systems reviewed and are negative.       Past Medical and Surgical History:  Breast Biopsy History:  Patient has not had a breast biopsy in the past.  Breast Cancer HIstory:  Patient does not have a past medical history of breast cancer.  Breast Operations, and year:  NONE   Obstetric/Gynecologic History:  Age menstrual periods began: 16  Patient is premenopausal, first day of last period: 16  Number of pregnancies: 1  Number of live births: 2  Number of abortions or miscarriages:  "0  Age of delivery of first child: 33  Patient breast fed, for the following lenth of time: 3 MONTHS   Length of time taking birth control pills: 5 YRS   Patient has never taken hormone replacement  PATIENT HAS BOTH OVARIES AND UTERUS.     Past Surgical History:   Procedure Laterality Date   •  SECTION       Past Medical History:   Diagnosis Date   • Hypertension    • Kidney stones    • Migraines        Prior Hospitalizations, other than for surgery or childbirth, and year:  KIDNEY STONES     Social History     Socioeconomic History   • Marital status:    Tobacco Use   • Smoking status: Never Smoker   • Smokeless tobacco: Never Used   Substance and Sexual Activity   • Alcohol use: No   • Drug use: No   • Sexual activity: Yes     Partners: Male     Birth control/protection: None     Patient is .  Patient is employed full time with the following occupation:   Patient drinks 2 servings of caffeine per day.    Family History:  Family History   Problem Relation Age of Onset   • Breast cancer Mother 32   • Cancer Mother 34        female cancer   • Hypertension Father    • No Known Problems Sister    • No Known Problems Brother    • No Known Problems Daughter    • No Known Problems Son    • No Known Problems Maternal Grandmother    • Melanoma Paternal Grandmother 70   • No Known Problems Maternal Aunt    • No Known Problems Paternal Aunt    • Breast cancer Paternal Aunt         p great aunt  unknown age    • BRCA 1/2 Neg Hx    • Colon cancer Neg Hx    • Endometrial cancer Neg Hx    • Ovarian cancer Neg Hx    • Uterine cancer Neg Hx        Vital Signs:  /84 (BP Location: Left arm)   Ht 160 cm (63\")   Wt 49 kg (108 lb)   BMI 19.13 kg/m²      Medications:    Current Outpatient Medications:   •  Cephalexin 250 MG tablet, , Disp: , Rfl:   •  metoprolol succinate XL (TOPROL-XL) 50 MG 24 hr tablet, TAKE ONE TABLET BY MOUTH TWICE A DAY, Disp: , Rfl:   No current " "facility-administered medications for this visit.     Allergies:  Allergies   Allergen Reactions   • Nitrofurantoin Hives   • Ciprofloxacin-Hydrocortisone Hives   • Dilaudid [Hydromorphone Hcl] Hives       Physical Examination:  /84 (BP Location: Left arm)   Ht 160 cm (63\")   Wt 49 kg (108 lb)   BMI 19.13 kg/m²      I reviewed physical exam, no changes noted    General Appearance:  Patient is in no distress.  She is well kept and has an thin build.   Psychiatric:  Patient with appropriate mood and affect. Alert and oriented to self, time, and place.    Breast, RIGHT:  small sized, 34B, symmetric with the contralateral side.  Breast skin is without erythema, edema, rashes.  There are no visible abnormalities upon inspection during the arm-raising maneuver or with hands on hips in the sitting position. There is no nipple retraction, discharge or nipple/areolar skin changes.There are no masses palpable in the sitting or supine positions.    Breast, LEFT:  small sized, 34B,  symmetric with the contralateral side.  Breast skin is without erythema, edema, rashes.  There are no visible abnormalities upon inspection during the arm-raising maneuver or with hands on hips in the sitting position. There is no nipple retraction, discharge or nipple/areolar skin changes.There are no masses palpable in the sitting or supine positions.    Lymphatic:  There is no axillary, cervical, infraclavicular, or supraclavicular adenopathy bilaterally.  Musculoskeletal:  Good strength in all 4 extremities.   There is good range of motion in both shoulders.    Skin:  No new skin lesions or rashes on the skin excluding the breast (see breast exam above).        Imagin2021: [facility]  Is extremely dense.  There are round, Morphis and punctate calcifications with scattered distribution seen in both breasts.  There are no significant changes from prior exams.  " Parenchyma include stable nodular asymmetries.  No suspicious masses, suspicious microcalcifications or areas of architectural distortion are identified.  Impression:  Calcifications in both breasts are benign negative.  Screening mammogram 1 year is recommended.  BI-RADS Category 2    Labs:    2019  [facility]  Genetic   Cari Mathis  BRCA1 and BRCA2 Aynalysis  BRCA2 - c.4747A>T; UNCERTAIN CLINICAL SIGNIFICANCE      [employer] [facility]reditary Cancer Screen  2019  [facility]    Genetic   Cari Mathis  GENETIC RESULT: NEGATIVE - NO CLINICALLY SIGNIFICANT MUTATION IDENTIFIED  ADDITIONAL FINDINGS:  GENE    VARIANT(S) OF UNCERTAIN SIGNIFICANCE  BRCA2    c.4747A>T  MLH1    c. 896G>A        Assessment:    1- Benign breast changes    2- breast density  Extremely dense breast tissue on mammography    3- Family history of breast cancer,  CRA risk assessment (5 models)  As:8-25%  Mother age 32, , did not have genetic testing  PGM age 70s    4- Increased risk of breast cancer  My Risk-   -VUS in BRCA2 c.4747A>T  -VUS in MLH1 c.896G>A    Discussion:  Her breast tissue is extremely dense.  Breast density describes how the breasts look on a mammogram.  Breast and connective tissue are denser than fat and this difference shows up on the mammogram.  Young women often have dense breasts.  As we age, breast become less dense.  Dense breast can make it harder to find breast cancer on the mammogram.  Women with high breast density have an increased risk of breast cancer.  Educational materials regarding breast density were given and reviewed.    We discussed her increased risk and options for follow up.    Her most significant risk factors for breast cancer include the following: mother having had breast cancer, premenopausal  I calculated her lifetime risk of breast cancer using the CRA risk assessment (5 models)  As:8-25%  I calculated her lifetime risk of breast cancer using the Deisi model (not  useful for women under the age of 35 years) as: 1.3%  With a lifetime risk of breast cancer greater than 20%, the current recommendations for screening include annual mammography and annual MRI, with clinical examination.     We also discussed that for a Deisi model 5 year risk greater than 1.7% or LCIS, and a life expectancy > 10 years, that we recommend risk reduction counseling with the medical oncologists about chemopreventive agents such as tamoxifen, raloxifene, or exemestane.       Dr Solis previously discussed and reviewed today the variant of uncertain significance detected in the my risk panel.  We discussed that there is no action needed for a variant of uncertain significance but it would be prudent to check in with a company on interval basis to see if there have been any updates on her variants  We discussed management options for individuals who are at increased risk (>20% lifetime risk):  1) High risk screening - Annual mammogram and annual breast MRI, alternating one test every 6 months, biannual clinical exam and monthly self breast exam. She meets criteria for high risk screening.  2) Chemoprevention with Tamoxifen, Raloxifene or Exemestane. These may reduce risk up to 50%. I reviewed that these particular medications are not without risks and the risk/benefit ratio must be considered carefully. Dr Solis has reviewed the option of talking with medical oncology about risk reducing medications and she is certain that she is not interested in this consultation.  3) Risk reducing surgery such as prophylactic mastectomy  which may reduce risk by 90-95%. We discussed that this is a relatively radical strategy and is generally reserved for individuals with a known genetic mutation predisposing them to an increased risk (~50% risk) of breast cancer. She does not meet criteria for risk reducing surgery.   4) I discussed the importance of exercise and weight management as part of a risk reducing  strategy, since increased BMI is associated with an increased risk of breast cancer.     She wishes to continue with MRI and mammographic surveillance together once annually.  We discussed the other option of q 6 mo imaging, she is interested in this plan.  We will complete screening mammogram in 12 months followed by exam and MRI in 18 months.    Plan:    We reviewed her imaging, interval history, imaging results, and exam together today.     Both imaging and exam are in good order.      Screening mammogram with tomosynthesis in 12 months at [facility] followed by exam      I asked her to continue her self breast exam monthly .  I asked her to call in the interim with concerns would be happy to see her back sooner.    Cari and her  Ian are neighbors of Dr Solis in [facility].    SNOW Willis/transcription disclaimer:  Dictated using Dragon dictation

## 2021-11-30 ENCOUNTER — TELEPHONE (OUTPATIENT)
Dept: SURGERY | Facility: CLINIC | Age: 46
End: 2021-11-30

## 2021-11-30 NOTE — TELEPHONE ENCOUNTER
Patient is aware.     ----- Message from SNOW Willis sent at 11/30/2021  8:30 AM EST -----  Please contact patient and let her know that her MRI results are stable, no changes noted.  I will see her back in 11/22 as discussed. thanks

## 2021-11-30 NOTE — PROGRESS NOTES
Please contact patient and let her know that her MRI results are stable, no changes noted.  I will see her back in 11/22 as discussed. thanks Today I spoke with Logan, Mariaa's . Mariaa returned home following surgery on Tuesday, 9/19/2017. She was doing relatively well with only mild pain. Yesterday morning Logan found Mariaa sitting in a chair, unresponsive and pulseless. He contacted the emergency responders, who apparently attempted CPR.    I reassured Logan that the surgery went well without incident. Mariaa's intrathecal pain pump was filled with normal saline only.    Logan will contact us if he has any other questions or concerns.    I expressed my condolences.

## 2022-05-11 ENCOUNTER — OFFICE VISIT (OUTPATIENT)
Dept: OBSTETRICS AND GYNECOLOGY | Age: 47
End: 2022-05-11

## 2022-05-11 VITALS
HEIGHT: 63 IN | SYSTOLIC BLOOD PRESSURE: 120 MMHG | BODY MASS INDEX: 19.45 KG/M2 | WEIGHT: 109.8 LBS | DIASTOLIC BLOOD PRESSURE: 62 MMHG

## 2022-05-11 DIAGNOSIS — Z01.419 WELL WOMAN EXAM WITH ROUTINE GYNECOLOGICAL EXAM: Primary | ICD-10-CM

## 2022-05-11 DIAGNOSIS — Z12.4 CERVICAL CANCER SCREENING: ICD-10-CM

## 2022-05-11 PROCEDURE — 99396 PREV VISIT EST AGE 40-64: CPT | Performed by: STUDENT IN AN ORGANIZED HEALTH CARE EDUCATION/TRAINING PROGRAM

## 2022-05-11 RX ORDER — FLUTICASONE PROPIONATE 50 MCG
2 SPRAY, SUSPENSION (ML) NASAL DAILY
COMMUNITY
End: 2023-01-24

## 2022-05-11 NOTE — PROGRESS NOTES
[facility]   Obstetrics and Gynecology   Routine Annual Visit    2022    Patient: Cari Mathis          MR#:[id number]    History of Present Illness    Chief Complaint   Patient presents with   • Gynecologic Exam     New GYN, Annual exam, Last Pap 2019 NEG, HPV NEG, Last Mammogram 2021 normal, Breast MRI 2021 normal, Pt had surgery last month on abdomen        47 y.o. female  who presents for annual exam.  Reports irregular menses since Dec.  Had a few hot flashes last year too.  Feels like menopause is coming but no major issues currently.    Had abdominoplasty last month and is doing well since surgery.    Studies reviewed:  PapIG, HPV, Rfx 16 / 18 (2019 12:28) -  NIL, HPV neg, denies h/o abnormal paps  SCANNED - MAMMO (2021) - normal, repeat 1 yr, high risk breast cancer risk so seeing Dr. Solis, alternates mammo/MRI, declined chemoprevention  Colonoscopy in last 10 yrs but unsure exact year, normal    Obstetric History:  OB History        1    Para   1    Term   0       1    AB        Living   2       SAB        IAB        Ectopic        Molar        Multiple   1    Live Births   2               Menstrual History:     Patient's last menstrual period was 2022 (exact date).       Sexual History:   Sexually active with  only, declines STD screen    ________________________________________  Patient Active Problem List   Diagnosis   • Increased risk of breast cancer   • BRCA negative   • Fibrocystic breast changes, bilateral   • Breast density   • FH: breast cancer     Past Medical History:   Diagnosis Date   • Fibrocystic breast changes, bilateral 2021   • Hypertension    • Kidney stones    • Migraines      Past Surgical History:   Procedure Laterality Date   • ABDOMINOPLASTY      with mesh   •  SECTION       Social History     Tobacco Use   Smoking Status Never Smoker   Smokeless Tobacco Never Used     Family  "History   Problem Relation Age of Onset   • Hypertension Father    • Polymyalgia rheumatica Father    • Breast cancer Mother 32   • Cancer Mother 34        female cancer   • No Known Problems Brother    • No Known Problems Sister    • No Known Problems Son    • No Known Problems Daughter    • Melanoma Paternal Grandmother 70   • No Known Problems Maternal Grandmother    • No Known Problems Maternal Aunt    • No Known Problems Paternal Aunt    • Breast cancer Paternal Aunt         p great aunt  unknown age    • BRCA 1/2 Neg Hx    • Colon cancer Neg Hx    • Endometrial cancer Neg Hx    • Ovarian cancer Neg Hx    • Uterine cancer Neg Hx      Prior to Admission medications    Medication Sig Start Date End Date Taking? Authorizing Provider   Cephalexin 250 MG tablet  11/17/21   Vishnu Theodore MD   metoprolol succinate XL (TOPROL-XL) 50 MG 24 hr tablet TAKE ONE TABLET BY MOUTH TWICE A DAY 9/11/18   ProviderVishnu MD     ________________________________________    Current contraception: none  History of abnormal Pap smear: no  Family history of uterine or ovarian cancer: no  Family History of colon cancer/colon polyps: no  History of abnormal mammogram: no    The following portions of the patient's history were reviewed and updated as appropriate: allergies, current medications, past family history, past medical history, past social history, past surgical history and problem list.    Review of Systems   All other systems reviewed and are negative.           Objective     /62   Ht 160 cm (63\")   Wt 49.8 kg (109 lb 12.8 oz)   LMP 04/05/2022 (Exact Date)   Breastfeeding No   BMI 19.45 kg/m²    BP Readings from Last 3 Encounters:   05/11/22 120/62   11/29/21 123/84   10/14/21 112/64      Wt Readings from Last 3 Encounters:   05/11/22 49.8 kg (109 lb 12.8 oz)   11/29/21 49 kg (108 lb)   10/14/21 49 kg (108 lb)        BMI: Estimated body mass index is 19.45 kg/m² as calculated from the following:    " "Height as of this encounter: 160 cm (63\").    Weight as of this encounter: 49.8 kg (109 lb 12.8 oz).    Physical Exam  Vitals and nursing note reviewed.   Constitutional:       General: She is not in acute distress.     Appearance: Normal appearance.   HENT:      Head: Normocephalic and atraumatic.   Eyes:      Extraocular Movements: Extraocular movements intact.   Cardiovascular:      Rate and Rhythm: Normal rate and regular rhythm.      Pulses: Normal pulses.      Heart sounds: No murmur heard.  Pulmonary:      Effort: Pulmonary effort is normal. No respiratory distress.      Breath sounds: Normal breath sounds.   Chest:   Breasts:      Right: Normal. No mass, nipple discharge, skin change, tenderness or axillary adenopathy.      Left: Normal. No mass, nipple discharge, skin change, tenderness or axillary adenopathy.       Abdominal:      General: There is no distension.      Palpations: Abdomen is soft. There is no mass.      Tenderness: There is no abdominal tenderness.   Genitourinary:     General: Normal vulva.      Labia:         Right: No rash or lesion.         Left: No rash or lesion.       Urethra: No prolapse, urethral swelling or urethral lesion.      Vagina: Normal.      Cervix: Normal.      Uterus: Normal.       Adnexa: Right adnexa normal and left adnexa normal.      Comments: Bladder: no masses or tenderness  Perineum/Anus: no masses, lesions, or skin changes  Musculoskeletal:         General: No swelling. Normal range of motion.      Cervical back: Normal range of motion.   Lymphadenopathy:      Upper Body:      Right upper body: No axillary adenopathy.      Left upper body: No axillary adenopathy.   Skin:     General: Skin is warm and dry.   Neurological:      General: No focal deficit present.      Mental Status: She is alert and oriented to person, place, and time.   Psychiatric:         Mood and Affect: Mood normal.         Behavior: Behavior normal.         As part of wellness and prevention, " the following topics were discussed with the patient:  Encouraged self breast exam  Physical activity and regular exercised encouraged.   Injury prevention discussed.  Healthy weight discussed.  Nutrition discussed.  Substance abuse/misuse discussed.  Sexual behavior/safe practices discussed.   Sexual transmitted disease prevention   Mental health discussed.           Assessment:  Diagnoses and all orders for this visit:    1. Well woman exam with routine gynecological exam (Primary)  -     IGP, Apt HPV,rfx 16 / 18,45    2. Cervical cancer screening  -     IGP, Apt HPV,rfx 16 / 18,45      -Breast and pelvic exam normal  - Declined STD screen  - Pap today  - Mammogram and colonoscopy up-to-date    Plan:  Return in about 1 year (around 5/11/2023) for Annual.      Shirin Amado MD  5/11/2022 08:29 EDT

## 2022-05-18 LAB
CYTOLOGIST CVX/VAG CYTO: NORMAL
CYTOLOGY CVX/VAG DOC CYTO: NORMAL
CYTOLOGY CVX/VAG DOC THIN PREP: NORMAL
DX ICD CODE: NORMAL
HIV 1 & 2 AB SER-IMP: NORMAL
HPV I/H RISK 4 DNA CVX QL PROBE+SIG AMP: NEGATIVE
Lab: NORMAL
OTHER STN SPEC: NORMAL
STAT OF ADQ CVX/VAG CYTO-IMP: NORMAL

## 2022-05-20 NOTE — PROGRESS NOTES
Please call patient to inform her Pap smear is normal and HPV negative. I recommend repeating in 5 years.  Thank you!    Shirin Amado MD  9/29/2021  10:14 EDT

## 2022-08-19 ENCOUNTER — TELEPHONE (OUTPATIENT)
Dept: OBSTETRICS AND GYNECOLOGY | Age: 47
End: 2022-08-19

## 2022-08-19 NOTE — TELEPHONE ENCOUNTER
"Caller: Cari Mathis    Relationship to patient: Self    Best call back number: [phone]  OK TO CALL ANYTIME/LVM    Chief complaint: PT IS IN PERIMENOPAUSE AND IS CALLING DUE TO TWO WEEKS OF LIGHT FLOW DARK \"OLD\"  BLOOD. SHE SAID SHE ONLY NEEDS TO WEAR PANTY LINERS BUT EVERY TIME SHE WIPES IT IS THERE.     SHE IS WANTING TO KNOW IS THIS NORMAL? OR SHOULD SHE MAKE AN APPOINTMENT?    PLEASE CONTACT CLIENT TO ADVISE OR SCHEDULE AN APPT IF NECESSARY    "

## 2022-08-19 NOTE — TELEPHONE ENCOUNTER
If she has not had a menses for over 12 months, she should schedule GYN ultrasound and appointment.  However, if she has still been menstruating and they are spacing out but has not actually gone 12 months without a menses, she is okay to monitor.  She is also welcome to schedule a GYN ultrasound and appointment for evaluation if she is concerned.

## 2022-08-24 ENCOUNTER — OFFICE VISIT (OUTPATIENT)
Dept: OBSTETRICS AND GYNECOLOGY | Age: 47
End: 2022-08-24

## 2022-08-24 VITALS
WEIGHT: 108 LBS | DIASTOLIC BLOOD PRESSURE: 70 MMHG | HEIGHT: 62 IN | SYSTOLIC BLOOD PRESSURE: 112 MMHG | BODY MASS INDEX: 19.88 KG/M2

## 2022-08-24 DIAGNOSIS — N93.9 ABNORMAL UTERINE BLEEDING (AUB): Primary | ICD-10-CM

## 2022-08-24 PROCEDURE — 99213 OFFICE O/P EST LOW 20 MIN: CPT | Performed by: STUDENT IN AN ORGANIZED HEALTH CARE EDUCATION/TRAINING PROGRAM

## 2022-08-24 NOTE — PROGRESS NOTES
[facility]   Obstetrics and Gynecology     2022      Patient:  Cari Mathis   MR#:[id number]    Office note    Chief Complaint   Patient presents with   • Gynecologic Exam     Irregular menses, after period ends there is still a light bleeding that follows.       Subjective     History of Present Illness  47 y.o. female  presents with prolonged menses.  Full menses started  for a week then has had spotting since that stopped today.  Has been skipping some months of menses for last 8 mo but not gone 12 mo without menses.        Relevant data reviewed:  US Non-ob Transvaginal (2022 12:17)      Patient Active Problem List   Diagnosis   • Increased risk of breast cancer   • BRCA negative   • Fibrocystic breast changes, bilateral   • Breast density   • FH: breast cancer       Past Medical History:   Diagnosis Date   • Fibrocystic breast changes, bilateral 2021   • Hypertension    • Kidney stones    • Migraines      Past Surgical History:   Procedure Laterality Date   • ABDOMINOPLASTY      with mesh   •  SECTION       Obstetric History:  OB History        1    Para   1    Term   0       1    AB        Living   2       SAB        IAB        Ectopic        Molar        Multiple   1    Live Births   2               Menstrual History:     Patient's last menstrual period was 2022.       # 1A - Date: 2008, Sex: Female, Weight: 2126 g (4 lb 11 oz), GA: 34w0d, Delivery: , Unspecified, Apgar1: None, Apgar5: None, Living: Living, Birth Comments: None  # 1B - Date: None, Sex: Female, Weight: 2070 g (4 lb 9 oz), GA: 34w0d, Delivery: , Unspecified, Apgar1: None, Apgar5: None, Living: Living, Birth Comments: None    Family History   Problem Relation Age of Onset   • Hypertension Father    • Polymyalgia rheumatica Father    • Breast cancer Mother 32   • Cancer Mother 34        female cancer   • No Known Problems Brother    • No Known Problems  "Sister    • No Known Problems Son    • No Known Problems Daughter    • Melanoma Paternal Grandmother 70   • No Known Problems Maternal Grandmother    • No Known Problems Maternal Aunt    • No Known Problems Paternal Aunt    • Breast cancer Paternal Aunt         p great aunt  unknown age    • BRCA 1/2 Neg Hx    • Colon cancer Neg Hx    • Endometrial cancer Neg Hx    • Ovarian cancer Neg Hx    • Uterine cancer Neg Hx      Social History     Tobacco Use   • Smoking status: Never Smoker   • Smokeless tobacco: Never Used   Vaping Use   • Vaping Use: Never used   Substance Use Topics   • Alcohol use: No   • Drug use: No     Nitrofurantoin, Ciprofloxacin-hydrocortisone, and Dilaudid [hydromorphone hcl]    Current Outpatient Medications:   •  fluticasone (FLONASE) 50 MCG/ACT nasal spray, 2 sprays into the nostril(s) as directed by provider Daily., Disp: , Rfl:   •  metoprolol succinate XL (TOPROL-XL) 50 MG 24 hr tablet, TAKE ONE TABLET BY MOUTH TWICE A DAY, Disp: , Rfl:   •  Cephalexin 250 MG tablet, , Disp: , Rfl:     The following portions of the patient's history were reviewed and updated as appropriate: allergies, current medications, past family history, past medical history, past social history, past surgical history and problem list.    Review of Systems   All other systems reviewed and are negative.      BP Readings from Last 3 Encounters:   08/24/22 112/70   05/11/22 120/62   11/29/21 123/84      Wt Readings from Last 3 Encounters:   08/24/22 49 kg (108 lb)   05/11/22 49.8 kg (109 lb 12.8 oz)   11/29/21 49 kg (108 lb)      BMI: Estimated body mass index is 19.75 kg/m² as calculated from the following:    Height as of this encounter: 157.5 cm (62\").    Weight as of this encounter: 49 kg (108 lb). BSA: Estimated body surface area is 1.47 meters squared as calculated from the following:    Height as of this encounter: 157.5 cm (62\").    Weight as of this encounter: 49 kg (108 lb).    Objective   Physical Exam  Vitals " and nursing note reviewed.   Constitutional:       General: She is not in acute distress.     Appearance: Normal appearance.   Pulmonary:      Effort: Pulmonary effort is normal. No respiratory distress.   Abdominal:      General: There is no distension.      Palpations: Abdomen is soft.      Tenderness: There is no abdominal tenderness.   Neurological:      General: No focal deficit present.      Mental Status: She is alert and oriented to person, place, and time.   Psychiatric:         Mood and Affect: Mood normal.         Behavior: Behavior normal.         Thought Content: Thought content normal.         Judgment: Judgment normal.         Assessment & Plan     Diagnoses and all orders for this visit:    1. Abnormal uterine bleeding (AUB) (Primary)    -Discussed Us findings of small fibroids with possible explanation for abnormal menses but perimenopausal period is more likely.  We discussed that irregular menses in this time period is very common in no reason for concern.  She declines intervention at this time.  We discussed the possibility of medical versus surgical treatment if she changes her mind.    Return if symptoms worsen or fail to improve.    I spent 20 minutes caring for Cari Mathis on this date of service. This time includes time spent by me in the following activities: preparing for the visit, reviewing tests, obtaining and/or reviewing a separately obtained history, performing a medically appropriate examination and/or evaluation, counseling and educating the patient/family/caregiver, ordering medications, tests, or procedures and documenting information in the medical record.    Shirin Amado MD   8/24/2022 17:12 EDT

## 2022-11-09 ENCOUNTER — APPOINTMENT (OUTPATIENT)
Dept: WOMENS IMAGING | Facility: HOSPITAL | Age: 47
End: 2022-11-09

## 2022-11-09 PROCEDURE — 77063 BREAST TOMOSYNTHESIS BI: CPT | Performed by: RADIOLOGY

## 2022-11-09 PROCEDURE — 77067 SCR MAMMO BI INCL CAD: CPT | Performed by: RADIOLOGY

## 2022-11-22 NOTE — PROGRESS NOTES
Chief Complaint: Cari Mathis is a 47 y.o. female who was seen in consultation at the request of No ref. provider found  for GENETIC EVALUATION, family history breast cancer and a followup visit    History of Present Illness:  19: seen by Dr Solis  Patient presents with management of breast cancer risk and family history breast cancer.   She noted no new masses, skin changes, nipple discharge, nipple changes prior to her most recent imaging.    Her most recent imaging includes the followin2017  [facility]  Mammo  Cari Del  MAMMO SCREENING  Heterogeneously dense. I see no new or dominant masses, areas of architectural distortion or skin thickening. There is no evidence for axillary distortion or skin thickening. There is no evidence for axillary lymphadenopathy or nipple retraction. Scattered punctate monomorphic calcifications are seen throughout both breasts.  BIRADS Category 2, Benign.    2018  [facility]  Cari Mathis  Screening mammogram.  Extremely dense.  There are multiple stable milk of calcium and punctate calcifications with diffuse/scattered distribution seen in both breasts. This is a typically benign pattern/process. No suspicious masses, suspicious microcalcifications or new areas of architectural distortion are identified. There has been no significant change from the prior.  BIRADS Category 2, Benign.    She has not had a breast biopsy in the past.  She has her uterus and ovaries, is pre-perimenopausal, and takes nor hormones.  Her family history includes the following:   She has 2 daughters, 2 sisters, 1 MA, 1 PA. Her mother had breast cancer age 32, is  and did not have genetic testing. Her PGM had breast ca age 70s.    2019:  In the interim,  Cari Mathis  has done well.  She has noted no changes in her breast exam. No new masses, skin changes, nipple changes, nipple discharge either breast.     Her most recent imaging  includes the following[facility] with [facility]D.  The breast is extremely dense.  Superior asymmetry does not persist.  On ultrasound no suspicious mass calcifications or other abnormalities.  BI-RADS 1.     MRI November 18, 2019 bilateral breast MRI [facility].  No findings suspicious in either breast BI-RADS 2.    12/1/2020:   In the interim,  Cari Mathis  has done well.  She has noted no changes in her breast exam. No new masses, skin changes, nipple changes, nipple discharge either breast.   She denies headache, bone pain, belly pain, cough, changes in vision or gait.    Her most recent imaging includes the following:  Bilateral breast MRI November 20, 2020: [facility] right breast no suspicious enhancing mass, left breast no suspicious enhancing mass.  BI-RADS 1  [facility] bilateral screening mammogram with tomosynthesis November 20, 2020: The breasts are heterogenous Loy dense.  BI-RADS 2    11/9/2022 bilateral screening mammogram with [facility] at [facility]  The breasts are extremely dense.  There are round amorphous and punctuate calcifications with scattered distribution seen in both breast.  There are no significant changes from the prior exam.  The parenchyma include stable nodular asymmetries.  No suspicious masses suspicious microcalcifications or areas of architectural distortion identified.  Impression  Calcifications in both breast are benign negative.  BI-RADS 2    11/29/2021 MRI breast bilateral [facility]  FINDINGS: There is extreme fibroglandular tissue. There is marked  background parenchymal enhancement, which limits the sensitivity for  detection of invasive malignancy and DCIS.  RIGHT BREAST:    No suspicious enhancing mass or area of non-mass enhancement is  identified.  The visualized axilla is within normal limits.   LEFT BREAST:    No suspicious enhancing mass or area of non-mass  enhancement is  identified.  The visualized axilla is within normal limits.   EXTRAMAMMARY FINDINGS:   There are no abnormally enlarged internal mammary chain lymph nodes on  either side.     There is trace pleural fluid on the right.  IMPRESSION AND RECOMMENDATION: No MRI evidence of malignancy in either  breast. Recommend annual screening mammogram and breast MRI in one year.  BI-RADS Category 1:      21  She returns today for follow up with no breast changes other than tenderness that is c/w hormone changes.  Her menses have become irregular.    Screening with tomosynthesis at [facility] on 21 was stable, BiRAds 2. (see full report below)    Breast MRI was completed today, 21, results pending.    2022 Interval History    Review of Systems:  Review of Systems   Endocrine: Negative for hot flashes.   All other systems reviewed and are negative.       Past Medical and Surgical History:  Breast Biopsy History:  Patient has not had a breast biopsy in the past.  Breast Cancer HIstory:  Patient does not have a past medical history of breast cancer.  Breast Operations, and year:  NONE   Obstetric/Gynecologic History:  Age menstrual periods began: 16  Patient is premenopausal, first day of last period: 16  Number of pregnancies: 1  Number of live births: 2  Number of abortions or miscarriages: 0  Age of delivery of first child: 33  Patient breast fed, for the following lenth of time: 3 MONTHS   Length of time taking birth control pills: 5 YRS   Patient has never taken hormone replacement  PATIENT HAS BOTH OVARIES AND UTERUS.     Past Surgical History:   Procedure Laterality Date   • ABDOMINOPLASTY      with mesh   •  SECTION       Past Medical History:   Diagnosis Date   • Fibrocystic breast changes, bilateral 2021   • Hypertension    • Kidney stones    • Migraines        Prior Hospitalizations, other than for surgery or childbirth, and year:  KIDNEY STONES     Social History  "    Socioeconomic History   • Marital status:    Tobacco Use   • Smoking status: Never   • Smokeless tobacco: Never   Vaping Use   • Vaping Use: Never used   Substance and Sexual Activity   • Alcohol use: No   • Drug use: No   • Sexual activity: Not Currently     Partners: Male     Birth control/protection: None     Patient is .  Patient is employed full time with the following occupation:   Patient drinks 2 servings of caffeine per day.    Family History:  Family History   Problem Relation Age of Onset   • Hypertension Father    • Polymyalgia rheumatica Father    • Breast cancer Mother 32   • Cancer Mother 34        female cancer   • No Known Problems Brother    • No Known Problems Sister    • No Known Problems Son    • No Known Problems Daughter    • Melanoma Paternal Grandmother 70   • No Known Problems Maternal Grandmother    • No Known Problems Maternal Aunt    • No Known Problems Paternal Aunt    • Breast cancer Paternal Aunt         p great aunt  unknown age    • BRCA 1/2 Neg Hx    • Colon cancer Neg Hx    • Endometrial cancer Neg Hx    • Ovarian cancer Neg Hx    • Uterine cancer Neg Hx        Vital Signs:  /78 (BP Location: Right arm, Patient Position: Sitting)   Pulse 81   Ht 157.5 cm (62\")   Wt 49.9 kg (110 lb)   SpO2 100%   BMI 20.12 kg/m²      Medications:    Current Outpatient Medications:   •  metoprolol succinate XL (TOPROL-XL) 50 MG 24 hr tablet, TAKE ONE TABLET BY MOUTH TWICE A DAY, Disp: , Rfl:   •  Cephalexin 250 MG tablet, , Disp: , Rfl:   •  fluticasone (FLONASE) 50 MCG/ACT nasal spray, 2 sprays into the nostril(s) as directed by provider Daily., Disp: , Rfl:   •  metoprolol succinate XL (TOPROL-XL) 50 MG 24 hr tablet, Take 1 tablet by mouth Daily., Disp: , Rfl:      Allergies:  Allergies   Allergen Reactions   • Nitrofurantoin Hives   • Ciprofloxacin-Hydrocortisone Hives   • Dilaudid [Hydromorphone Hcl] Hives   • Hibiclens [Chlorhexidine Gluconate] " "Rash       Physical Examination:  /78 (BP Location: Right arm, Patient Position: Sitting)   Pulse 81   Ht 157.5 cm (62\")   Wt 49.9 kg (110 lb)   SpO2 100%   BMI 20.12 kg/m²      I reviewed physical exam, no changes noted    General Appearance:  Patient is in no distress.  She is well kept and has an thin build.   Psychiatric:  Patient with appropriate mood and affect. Alert and oriented to self, time, and place.    Breast, RIGHT:  small sized, 34B, symmetric with the contralateral side.  Breast skin is without erythema, edema, rashes.  There are no visible abnormalities upon inspection during the arm-raising maneuver or with hands on hips in the sitting position. There is no nipple retraction, discharge or nipple/areolar skin changes.There are no masses palpable in the sitting or supine positions.    Breast, LEFT:  small sized, 34B,  symmetric with the contralateral side.  Breast skin is without erythema, edema, rashes.  There are no visible abnormalities upon inspection during the arm-raising maneuver or with hands on hips in the sitting position. There is no nipple retraction, discharge or nipple/areolar skin changes.There are no masses palpable in the sitting or supine positions.    Lymphatic:  There is no axillary, cervical, infraclavicular, or supraclavicular adenopathy bilaterally.    Imagin2021: [facility]  Is extremely dense.  There are round, Morphis and punctate calcifications with scattered distribution seen in both breasts.  There are no significant changes from prior exams.  Parenchyma include stable nodular asymmetries.  No suspicious masses, suspicious microcalcifications or areas of architectural distortion are identified.  Impression:  Calcifications in both breasts are benign negative.  Screening mammogram 1 year is recommended.  BI-RADS Category 2    Labs:    2019  [employer]  Genetic   Cari " Del  BRCA1 and BRCA2 [employer]  BRCA2 - c.4747A>T; UNCERTAIN CLINICAL SIGNIFICANCE      [employer] Hereditary Cancer Screen  2019  [employer]    Genetic   Cari Mathis  GENETIC RESULT: NEGATIVE - NO CLINICALLY SIGNIFICANT MUTATION IDENTIFIED  ADDITIONAL FINDINGS:  GENE    VARIANT(S) OF UNCERTAIN SIGNIFICANCE  BRCA2    c.4747A>T  MLH1    c. 896G>A        Assessment:    1- Benign breast changes    2- breast density  Extremely dense breast tissue on mammography    3- Family history of breast cancer,  CRA risk assessment (5 models)  As:8-25%  Mother age 32, , did not have genetic testing  PGM age 70s    4- Increased risk of breast cancer  My Risk-   -VUS in BRCA2 c.4747A>T  -VUS in MLH1 c.896G>A    Discussion:  Her breast tissue is extremely dense.  Breast density describes how the breasts look on a mammogram.  Breast and connective tissue are denser than fat and this difference shows up on the mammogram.  Young women often have dense breasts.  As we age, breast become less dense.  Dense breast can make it harder to find breast cancer on the mammogram.  Women with high breast density have an increased risk of breast cancer.  Educational materials regarding breast density were given and reviewed.    We discussed her increased risk and options for follow up.    Her most significant risk factors for breast cancer include the following: mother having had breast cancer, premenopausal  I calculated her lifetime risk of breast cancer using the CRA risk assessment (5 models)  As:8-25%  I calculated her lifetime risk of breast cancer using the Deisi model (not useful for women under the age of 35 years) as: 1.3%  With a lifetime risk of breast cancer greater than 20%, the current recommendations for screening include annual mammography and annual MRI, with clinical examination.     We also discussed that for a Deisi model 5 year risk greater than 1.7% or LCIS, and a life expectancy > 10 years, that we  recommend risk reduction counseling with the medical oncologists about chemopreventive agents such as tamoxifen, raloxifene, or exemestane.       Dr Solis previously discussed and reviewed today the variant of uncertain significance detected in the my risk panel.  We discussed that there is no action needed for a variant of uncertain significance but it would be prudent to check in with a company on interval basis to see if there have been any updates on her variants  We discussed management options for individuals who are at increased risk (>20% lifetime risk):  1) High risk screening - Annual mammogram and annual breast MRI, alternating one test every 6 months, biannual clinical exam and monthly self breast exam. She meets criteria for high risk screening.  2) Chemoprevention with Tamoxifen, Raloxifene or Exemestane. These may reduce risk up to 50%. I reviewed that these particular medications are not without risks and the risk/benefit ratio must be considered carefully. Dr Solis has reviewed the option of talking with medical oncology about risk reducing medications and she is certain that she is not interested in this consultation.  3) Risk reducing surgery such as prophylactic mastectomy  which may reduce risk by 90-95%. We discussed that this is a relatively radical strategy and is generally reserved for individuals with a known genetic mutation predisposing them to an increased risk (~50% risk) of breast cancer. She does not meet criteria for risk reducing surgery.   4) I discussed the importance of exercise and weight management as part of a risk reducing strategy, since increased BMI is associated with an increased risk of breast cancer.     She wishes to continue with MRI and mammographic surveillance together once annually.  We discussed the other option of q 6 mo imaging, she is interested in this plan.  We will complete screening mammogram in 12 months followed by exam and MRI in 18  months.    Plan:    We reviewed her imaging, interval history, imaging results, and exam together today.     Both imaging and exam are in good order.      Breast MRI in 6 mons followed by exam       I asked her to continue her self breast exam monthly .  I asked her to call in the interim with concerns would be happy to see her back sooner.    Cari and her  Ian are neighbors of Dr Solis in [facility].    SNOW Huber/transcription disclaimer:  Dictated using Dragon dictation

## 2022-11-29 ENCOUNTER — TELEPHONE (OUTPATIENT)
Dept: SURGERY | Facility: CLINIC | Age: 47
End: 2022-11-29

## 2022-11-29 ENCOUNTER — OFFICE VISIT (OUTPATIENT)
Dept: SURGERY | Facility: CLINIC | Age: 47
End: 2022-11-29

## 2022-11-29 VITALS
BODY MASS INDEX: 20.24 KG/M2 | DIASTOLIC BLOOD PRESSURE: 78 MMHG | OXYGEN SATURATION: 100 % | HEIGHT: 62 IN | WEIGHT: 110 LBS | HEART RATE: 81 BPM | SYSTOLIC BLOOD PRESSURE: 112 MMHG

## 2022-11-29 DIAGNOSIS — R92.2 BREAST DENSITY: Primary | ICD-10-CM

## 2022-11-29 DIAGNOSIS — N60.11 FIBROCYSTIC BREAST CHANGES, BILATERAL: ICD-10-CM

## 2022-11-29 DIAGNOSIS — Z80.3 FH: BREAST CANCER: ICD-10-CM

## 2022-11-29 DIAGNOSIS — Z91.89 INCREASED RISK OF BREAST CANCER: ICD-10-CM

## 2022-11-29 DIAGNOSIS — N60.12 FIBROCYSTIC BREAST CHANGES, BILATERAL: ICD-10-CM

## 2022-11-29 PROCEDURE — 99214 OFFICE O/P EST MOD 30 MIN: CPT | Performed by: NURSE PRACTITIONER

## 2022-11-29 RX ORDER — METOPROLOL SUCCINATE 50 MG/1
1 TABLET, EXTENDED RELEASE ORAL DAILY
COMMUNITY
Start: 2022-11-18

## 2022-11-29 NOTE — TELEPHONE ENCOUNTER
Spoke with pt regarding MRI appt I made for her here @ [facility] scheduled on 5/1/2023 at 9:45 with an arrival time of 9:15. Pt confirmed    NL

## 2023-01-18 ENCOUNTER — TELEPHONE (OUTPATIENT)
Dept: OBSTETRICS AND GYNECOLOGY | Age: 48
End: 2023-01-18

## 2023-01-18 NOTE — TELEPHONE ENCOUNTER
PT STATES SHE IS EXPERIENCING HER MENSTRUAL CYCLES BEING ON/OFF WITH LIGHT BLEEDING WITH BROWN COLORED BLOOD. PT WANTING TO KNOW IF THIS PRE MENOPAUSAL SYMPTOMS AND IF IT IS NORMAL. PLEASE ADVISE PT.

## 2023-01-18 NOTE — TELEPHONE ENCOUNTER
Please let patient know that irregular menses often occurs in the perimenopausal time.  If she is concerned, she should schedule an appointment with a GYN ultrasound for evaluation.

## 2023-01-24 ENCOUNTER — OFFICE VISIT (OUTPATIENT)
Dept: OBSTETRICS AND GYNECOLOGY | Age: 48
End: 2023-01-24
Payer: COMMERCIAL

## 2023-01-24 VITALS
HEIGHT: 62 IN | SYSTOLIC BLOOD PRESSURE: 118 MMHG | DIASTOLIC BLOOD PRESSURE: 68 MMHG | BODY MASS INDEX: 20.61 KG/M2 | WEIGHT: 112 LBS

## 2023-01-24 DIAGNOSIS — N92.6 IRREGULAR MENSES: Primary | ICD-10-CM

## 2023-01-24 DIAGNOSIS — Z13.71 BRCA NEGATIVE: ICD-10-CM

## 2023-01-24 PROCEDURE — 99213 OFFICE O/P EST LOW 20 MIN: CPT | Performed by: PHYSICIAN ASSISTANT

## 2023-01-24 NOTE — PROGRESS NOTES
"Subjective     Chief Complaint   Patient presents with   • Menstrual Problem     c/o irregular menses had ultrasound today. Started around ag time with her period and has been bleeding since.       Cari Mathis is a 47 y.o.  whose LMP is Patient's last menstrual period was 2022 (approximate). presents with prolonged menses    Is having regular periods usually  Did note a time that she was irregular   Then started with a period around ag that went on for 15 days  Denies any other sx's associated with this but had some left sided discomfort, possibly r/t surgery in october  Is not at risk for pregnancy    H/o abdominoplasty in April, went in to remove staples in October     Pt of Dr ann  Seen for similar issue in August  She opted not to treat her sx's at that time  Irregular bleeding pattern thought to be r/t perimenopause    She is at high risk for breast cancer d/t family history  Sees Dr Solis routinely    No Additional Complaints Reported    The following portions of the patient's history were reviewed and updated as appropriate:vital signs, allergies, current medications, past family history, past medical history, past social history, past surgical history and problem list      Review of Systems   Genitourinary:positive for abnormal menstrual periods     Objective      /68   Ht 157.5 cm (62\")   Wt 50.8 kg (112 lb)   LMP 2022 (Approximate)   BMI 20.49 kg/m²     Physical Exam    General:   alert, comfortable and no distress   Heart: Not performed today   Lungs: Not performed today.   Breast: Not performed today   Neck: Not performed today   Abdomen: Not performed today   CVA: Not performed today   Pelvis: Not performed today   Extremities: Not performed today   Neurologic: negative   Psychiatric: Normal affect, judgement, and mood       Lab Review   Labs: No data reviewed    Imaging   Ultrasound - Pelvic Vaginal    Impression:    1.  Uterus: Normal size, anteverted, " volume is 141 cm    2.  Endometrium: Normal non-menopausal thickness and 3.8 mm     3.  Myometrium: Normal homogenous texture  and Multiple fibroids, 2 in number, ranging in size 1.0 to 3.0 cm     4.  Ovaries   Left: Normal/unremarkable , Complex cystic mass 3.2 cm  and with ovarian volume: 14 ml    Right: Normal/unremarkable  and with ovarian volume: 3.7 ml     Assessment & Plan     ASSESSMENT  1. Irregular menses    2. BRCA negative          PLAN  1.   Orders Placed This Encounter   Procedures   • Follicle Stimulating Hormone   • TSH   • CBC & Differential       2. U/s is reassuring and lining is thin. There is a cyst, ?hemorrhagic, see ont he left ovary. Would encourage rpt imaging be done in 4 wks with Dr Amado. Disc ovarian torsion risk and rupture as well.      3. Disc irregular bleeding/prolonged menses. Could consider iud, progesterone or low dose ocp. She is hesitant to do a low dose ocp d/t her family history, could discuss further with her breast surgeon.      H/o given on perimenopause    Follow up: 1 month(s)    JASVIR Farmer  1/24/2023

## 2023-01-25 LAB
BASOPHILS # BLD AUTO: 0.07 10*3/MM3 (ref 0–0.2)
BASOPHILS NFR BLD AUTO: 1.3 % (ref 0–1.5)
EOSINOPHIL # BLD AUTO: 0.18 10*3/MM3 (ref 0–0.4)
EOSINOPHIL NFR BLD AUTO: 3.5 % (ref 0.3–6.2)
ERYTHROCYTE [DISTWIDTH] IN BLOOD BY AUTOMATED COUNT: 12.1 % (ref 12.3–15.4)
FSH SERPL-ACNC: 99.3 MIU/ML
HCT VFR BLD AUTO: 36.7 % (ref 34–46.6)
HGB BLD-MCNC: 11.7 G/DL (ref 12–15.9)
IMM GRANULOCYTES # BLD AUTO: 0.01 10*3/MM3 (ref 0–0.05)
IMM GRANULOCYTES NFR BLD AUTO: 0.2 % (ref 0–0.5)
LYMPHOCYTES # BLD AUTO: 2.22 10*3/MM3 (ref 0.7–3.1)
LYMPHOCYTES NFR BLD AUTO: 42.8 % (ref 19.6–45.3)
MCH RBC QN AUTO: 27.9 PG (ref 26.6–33)
MCHC RBC AUTO-ENTMCNC: 31.9 G/DL (ref 31.5–35.7)
MCV RBC AUTO: 87.6 FL (ref 79–97)
MONOCYTES # BLD AUTO: 0.32 10*3/MM3 (ref 0.1–0.9)
MONOCYTES NFR BLD AUTO: 6.2 % (ref 5–12)
NEUTROPHILS # BLD AUTO: 2.39 10*3/MM3 (ref 1.7–7)
NEUTROPHILS NFR BLD AUTO: 46 % (ref 42.7–76)
NRBC BLD AUTO-RTO: 0 /100 WBC (ref 0–0.2)
PLATELET # BLD AUTO: 216 10*3/MM3 (ref 140–450)
RBC # BLD AUTO: 4.19 10*6/MM3 (ref 3.77–5.28)
TSH SERPL DL<=0.005 MIU/L-ACNC: 1.56 UIU/ML (ref 0.27–4.2)
WBC # BLD AUTO: 5.19 10*3/MM3 (ref 3.4–10.8)

## 2023-02-16 ENCOUNTER — OFFICE VISIT (OUTPATIENT)
Dept: OBSTETRICS AND GYNECOLOGY | Age: 48
End: 2023-02-16
Payer: COMMERCIAL

## 2023-02-16 VITALS
BODY MASS INDEX: 20.83 KG/M2 | DIASTOLIC BLOOD PRESSURE: 70 MMHG | WEIGHT: 113.2 LBS | HEIGHT: 62 IN | SYSTOLIC BLOOD PRESSURE: 114 MMHG

## 2023-02-16 DIAGNOSIS — N95.1 VASOMOTOR SYMPTOMS DUE TO MENOPAUSE: ICD-10-CM

## 2023-02-16 DIAGNOSIS — N92.6 IRREGULAR MENSES: Primary | ICD-10-CM

## 2023-02-16 DIAGNOSIS — N83.209 CYST OF OVARY, UNSPECIFIED LATERALITY: ICD-10-CM

## 2023-02-16 PROCEDURE — 99213 OFFICE O/P EST LOW 20 MIN: CPT | Performed by: STUDENT IN AN ORGANIZED HEALTH CARE EDUCATION/TRAINING PROGRAM

## 2023-02-16 NOTE — PROGRESS NOTES
[facility]   Obstetrics and Gynecology     2023      Patient:  Cari Mathis   MR#:[id number]    Office note    Chief Complaint   Patient presents with   • Gynecologic Exam     Follow up irregular bleeding with US       Subjective     History of Present Illness  47 y.o. female  presents for follow-up of irregular bleeding and ovarian cyst.  She has had on and off spotting since December.  She did have a full menses in December.  It seems to stop today but very well may start again soon.  She also reports the onset of hot flashes, especially at night.  Because of her maternal history of breast cancer, she is very wary of using any hormones including contraception.    -Increased risk of breast cancer, alternating breast MRI and mammo, last mammo 2022, next MRI scheduled   -Last colonoscopy on file , no polyps      Patient Active Problem List   Diagnosis   • Increased risk of breast cancer   • BRCA negative   • Fibrocystic breast changes, bilateral   • Breast density   • FH: breast cancer       Past Medical History:   Diagnosis Date   • Fibrocystic breast changes, bilateral 2021   • Hypertension    • Kidney stones    • Migraines      Past Surgical History:   Procedure Laterality Date   • ABDOMINOPLASTY      with mesh   •  SECTION       Obstetric History:  OB History        1    Para   1    Term   0       1    AB        Living   2       SAB        IAB        Ectopic        Molar        Multiple   1    Live Births   2               Menstrual History:     Patient's last menstrual period was 2022 (approximate).       # 1A - Date: 2008, Sex: Female, Weight: 2126 g (4 lb 11 oz), GA: 34w0d, Delivery: , Unspecified, Apgar1: None, Apgar5: None, Living: Living, Birth Comments: None  # 1B - Date: None, Sex: Female, Weight: 2070 g (4 lb 9 oz), GA: 34w0d, Delivery: , Unspecified, Apgar1: None, Apgar5: None, Living: Living, Birth Comments:  "None    Family History   Problem Relation Age of Onset   • Hypertension Father    • Polymyalgia rheumatica Father    • Breast cancer Mother 32   • Cancer Mother 34        female cancer   • No Known Problems Brother    • No Known Problems Sister    • No Known Problems Son    • No Known Problems Daughter    • Melanoma Paternal Grandmother 70   • No Known Problems Maternal Grandmother    • No Known Problems Maternal Aunt    • No Known Problems Paternal Aunt    • Breast cancer Paternal Aunt         p great aunt  unknown age    • BRCA 1/2 Neg Hx    • Colon cancer Neg Hx    • Endometrial cancer Neg Hx    • Ovarian cancer Neg Hx    • Uterine cancer Neg Hx      Social History     Tobacco Use   • Smoking status: Never   • Smokeless tobacco: Never   Vaping Use   • Vaping Use: Never used   Substance Use Topics   • Alcohol use: No   • Drug use: No     Nitrofurantoin, Ciprofloxacin-hydrocortisone, Dilaudid [hydromorphone hcl], and Hibiclens [chlorhexidine gluconate]    Current Outpatient Medications:   •  metoprolol succinate XL (TOPROL-XL) 50 MG 24 hr tablet, Take 1 tablet by mouth Daily., Disp: , Rfl:     The following portions of the patient's history were reviewed and updated as appropriate: allergies, current medications, past family history, past medical history, past social history, past surgical history and problem list.    Review of Systems   All other systems reviewed and are negative.      BP Readings from Last 3 Encounters:   02/16/23 114/70   01/24/23 118/68   11/29/22 112/78      Wt Readings from Last 3 Encounters:   02/16/23 51.3 kg (113 lb 3.2 oz)   01/24/23 50.8 kg (112 lb)   11/29/22 49.9 kg (110 lb)      BMI: Estimated body mass index is 20.7 kg/m² as calculated from the following:    Height as of this encounter: 157.5 cm (62\").    Weight as of this encounter: 51.3 kg (113 lb 3.2 oz). BSA: Estimated body surface area is 1.5 meters squared as calculated from the following:    Height as of this encounter: 157.5 " "cm (62\").    Weight as of this encounter: 51.3 kg (113 lb 3.2 oz).    Objective   Physical Exam  Vitals and nursing note reviewed.   Constitutional:       General: She is not in acute distress.     Appearance: Normal appearance.   Pulmonary:      Effort: Pulmonary effort is normal. No respiratory distress.   Neurological:      General: No focal deficit present.      Mental Status: She is alert and oriented to person, place, and time.   Psychiatric:         Mood and Affect: Mood normal.         Behavior: Behavior normal.         Thought Content: Thought content normal.         Judgment: Judgment normal.         Assessment & Plan     Diagnoses and all orders for this visit:    1. Irregular menses (Primary)    2. Vasomotor symptoms due to menopause    3. Cyst of ovary, unspecified laterality    -We reviewed today's ultrasound findings, no cyst, no need for follow-up  - Irregular menses is most likely due to perimenopause.  Discussed intervention with low Loestrin, cyclic progesterone, or IUD.  Also discussed surgical options of endometrial ablation and hysterectomy.  Patient declines for now.  - Discussed management of vasomotor symptoms.  She may be a candidate for HRT despite breast cancer history because she has had negative genetic testing but would want to discuss with Dr. Solis.  Discussed nonhormonal treatment options like Paxil, clonidine, and gabapentin.  She declines for now but is most interested in Paxil.  She will let me know if she wants to start this.  - RTC 3 months for annual.  She will confirm if her last colonoscopy was in 2013 so we can decide if she is due for colorectal cancer screening at her upcoming visit.  Discussed Cologuard versus colonoscopy.   - Alternating breast MRI and mamm[facility] at [facility],  has seen Dr. Solis in the past but not currently    Return in about 3 months (around 5/16/2023) for Annual.    I spent 20 minutes caring for Cari Mathis on this date of service. This time includes " time spent by me in the following activities: preparing for the visit, reviewing tests, obtaining and/or reviewing a separately obtained history, performing a medically appropriate examination and/or evaluation, counseling and educating the patient/family/caregiver, ordering medications, tests, or procedures and documenting information in the medical record.    Additional 5 minutes spent reviewing ultrasound.    Shirin Amado MD   2/16/2023 10:03 EST

## 2023-04-11 ENCOUNTER — PATIENT MESSAGE (OUTPATIENT)
Dept: OBSTETRICS AND GYNECOLOGY | Age: 48
End: 2023-04-11
Payer: COMMERCIAL

## 2023-05-01 ENCOUNTER — HOSPITAL ENCOUNTER (OUTPATIENT)
Dept: MRI IMAGING | Facility: HOSPITAL | Age: 48
Discharge: HOME | End: 2023-05-01
Admitting: NURSE PRACTITIONER
Payer: COMMERCIAL

## 2023-05-01 DIAGNOSIS — Z91.89 INCREASED RISK OF BREAST CANCER: ICD-10-CM

## 2023-05-01 PROCEDURE — 82565 ASSAY OF CREATININE: CPT

## 2023-05-01 PROCEDURE — 0 GADOBENATE DIMEGLUMINE 529 MG/ML SOLUTION: Performed by: NURSE PRACTITIONER

## 2023-05-01 PROCEDURE — 77049 MRI BREAST C-+ W/CAD BI: CPT

## 2023-05-01 PROCEDURE — A9577 INJ MULTIHANCE: HCPCS | Performed by: NURSE PRACTITIONER

## 2023-05-01 RX ADMIN — GADOBENATE DIMEGLUMINE 10 ML: 529 INJECTION, SOLUTION INTRAVENOUS at 10:00

## 2023-05-02 ENCOUNTER — TELEPHONE (OUTPATIENT)
Dept: SURGERY | Facility: CLINIC | Age: 48
End: 2023-05-02
Payer: COMMERCIAL

## 2023-05-02 LAB — CREAT BLDA-MCNC: 0.7 MG/DL (ref 0.6–1.3)

## 2023-05-02 NOTE — TELEPHONE ENCOUNTER
----- Message from SNOW Huber sent at 5/1/2023 12:46 PM EDT -----  Please let the pt know that her MRI is normal and to please keep appointment with me in June   ----- Message -----  From: Selina [facility] [place] [facility]  Sent: 5/1/2023  11:16 AM EDT  To: SNOW Huber

## 2023-05-24 ENCOUNTER — OFFICE VISIT (OUTPATIENT)
Dept: OBSTETRICS AND GYNECOLOGY | Age: 48
End: 2023-05-24
Payer: COMMERCIAL

## 2023-05-24 VITALS
BODY MASS INDEX: 20.94 KG/M2 | HEIGHT: 62 IN | DIASTOLIC BLOOD PRESSURE: 70 MMHG | WEIGHT: 113.8 LBS | SYSTOLIC BLOOD PRESSURE: 122 MMHG

## 2023-05-24 DIAGNOSIS — Z01.419 WELL WOMAN EXAM WITH ROUTINE GYNECOLOGICAL EXAM: Primary | ICD-10-CM

## 2023-05-24 PROCEDURE — 99396 PREV VISIT EST AGE 40-64: CPT | Performed by: STUDENT IN AN ORGANIZED HEALTH CARE EDUCATION/TRAINING PROGRAM

## 2023-05-24 RX ORDER — FLUTICASONE PROPIONATE 50 MCG
2 SPRAY, SUSPENSION (ML) NASAL DAILY
COMMUNITY

## 2023-05-24 NOTE — PROGRESS NOTES
[facility]   Obstetrics and Gynecology   Routine Annual Visit    2023    Patient: Cari Mathis          MR#:[id number]    History of Present Illness    Chief Complaint   Patient presents with   • Annual Exam     AE, Last AE 2022 w/pap nml and HPV neg, Mg 2022, No problems today       48 y.o. female  who presents for annual exam.  Menses have stopped since .  Irregular for about one year prior.  Was having hot flashes but they have also stopped.  Does report long history of poor libido, even before marriage.  Does not feel any significant change lately.  She has been hesitant about HRT due to maternal history of breast cancer.  She has appointment with Dr. Solis next week so can ask for her thoughts.    Studies reviewed:  -MRI Breast Bilateral With & Without Contrast (2023 10:06) - Increased risk of breast cancer, alternating breast MRI and mammo, last mammo 2022, appt with breast surgery 23  -SCANNED - LABS (2019) Myriad testing neg  -Last colonoscopy on file , no polyps with Dr. Librado Aleman  -IGP, Apt HPV,rfx 16 / 18,45 (2022 08:28) NIL, HPV neg      Obstetric History:  OB History        1    Para   1    Term   0       1    AB        Living   2       SAB        IAB        Ectopic        Molar        Multiple   1    Live Births   2               Menstrual History:     Patient's last menstrual period was 2023 (approximate).       Sexual History:    Sexually active with  only, declines STD screen    ________________________________________  Patient Active Problem List   Diagnosis   • Increased risk of breast cancer   • BRCA negative   • Fibrocystic breast changes, bilateral   • Breast density   • FH: breast cancer     Past Medical History:   Diagnosis Date   • Fibrocystic breast changes, bilateral 2021   • Hypertension    • Kidney stones    • Migraines      Past Surgical History:   Procedure Laterality Date   •  "ABDOMINOPLASTY      with mesh   •  SECTION       Social History     Tobacco Use   Smoking Status Never   • Passive exposure: Never   Smokeless Tobacco Never     Family History   Problem Relation Age of Onset   • Hypertension Father    • Polymyalgia rheumatica Father    • Breast cancer Mother 32   • Cancer Mother 34        female cancer   • No Known Problems Brother    • No Known Problems Sister    • No Known Problems Son    • No Known Problems Daughter    • Melanoma Paternal Grandmother 70   • No Known Problems Maternal Grandmother    • No Known Problems Maternal Aunt    • No Known Problems Paternal Aunt    • Breast cancer Paternal Aunt         p great aunt  unknown age    • BRCA 1/2 Neg Hx    • Colon cancer Neg Hx    • Endometrial cancer Neg Hx    • Ovarian cancer Neg Hx    • Uterine cancer Neg Hx      Prior to Admission medications    Medication Sig Start Date End Date Taking? Authorizing Provider   metoprolol succinate XL (TOPROL-XL) 50 MG 24 hr tablet Take 1 tablet by mouth Daily. 22   Provider, MD Vishnu     ________________________________________    Current contraception: post menopausal status  History of abnormal Pap smear: no  Family history of uterine or ovarian cancer: no  Family History of colon cancer/colon polyps: no  History of abnormal mammogram: no    The following portions of the patient's history were reviewed and updated as appropriate: allergies, current medications, past family history, past medical history, past social history, past surgical history and problem list.    Review of Systems   All other systems reviewed and are negative.           Objective     /70   Ht 157.5 cm (62\")   Wt 51.6 kg (113 lb 12.8 oz)   LMP 2023 (Approximate)   Breastfeeding No   BMI 20.81 kg/m²    BP Readings from Last 3 Encounters:   23 122/70   23 114/70   23 118/68      Wt Readings from Last 3 Encounters:   23 51.6 kg (113 lb 12.8 oz)   23 51.3 kg " "(113 lb 3.2 oz)   01/24/23 50.8 kg (112 lb)        BMI: Estimated body mass index is 20.81 kg/m² as calculated from the following:    Height as of this encounter: 157.5 cm (62\").    Weight as of this encounter: 51.6 kg (113 lb 12.8 oz).    Physical Exam  Vitals and nursing note reviewed.   Constitutional:       General: She is not in acute distress.     Appearance: Normal appearance.   HENT:      Head: Normocephalic and atraumatic.   Eyes:      Extraocular Movements: Extraocular movements intact.   Cardiovascular:      Rate and Rhythm: Normal rate and regular rhythm.      Pulses: Normal pulses.      Heart sounds: No murmur heard.  Pulmonary:      Effort: Pulmonary effort is normal. No respiratory distress.      Breath sounds: Normal breath sounds.   Chest:   Breasts:     Right: Normal. No mass, nipple discharge, skin change or tenderness.      Left: Normal. No mass, nipple discharge, skin change or tenderness.   Abdominal:      General: There is no distension.      Palpations: Abdomen is soft. There is no mass.      Tenderness: There is no abdominal tenderness.   Genitourinary:     General: Normal vulva.      Labia:         Right: No rash or lesion.         Left: No rash or lesion.       Urethra: No prolapse, urethral swelling or urethral lesion.      Vagina: Normal.      Cervix: Normal.      Uterus: Normal.       Adnexa: Right adnexa normal and left adnexa normal.      Comments: Bladder: no masses or tenderness  Perineum/Anus: no masses, lesions, or skin changes  Musculoskeletal:         General: No swelling. Normal range of motion.      Cervical back: Normal range of motion.   Lymphadenopathy:      Upper Body:      Right upper body: No axillary adenopathy.      Left upper body: No axillary adenopathy.   Skin:     General: Skin is warm and dry.   Neurological:      General: No focal deficit present.      Mental Status: She is alert and oriented to person, place, and time.   Psychiatric:         Mood and Affect: " Mood normal.         Behavior: Behavior normal.         As part of wellness and prevention, the following topics were discussed with the patient:  Encouraged self breast exam  Physical activity and regular exercised encouraged.   Injury prevention discussed.  Healthy weight discussed.  Nutrition discussed.  Substance abuse/misuse discussed.  Sexual behavior/safe practices discussed.   Sexual transmitted disease prevention   Mental health discussed.           Assessment:  Diagnoses and all orders for this visit:    1. Well woman exam with routine gynecological exam (Primary)    -Breast and pelvic exam normal  - Pap up-to-date  - Declined STD screen  - Mammogram up-to-date  - Patient will call her former GI physician about prior colonoscopy to determine if she needs one now.  She will let me know if they need an order from me.  - Discussed asking Dr. Solis's group if they would see her again in 6 months to space out her breast exams.      Plan:  Return in about 1 year (around 5/24/2024) for Annual.      Shirin Amado MD  5/24/2023 08:43 EDT

## 2023-05-25 ENCOUNTER — TELEPHONE (OUTPATIENT)
Dept: SURGERY | Facility: CLINIC | Age: 48
End: 2023-05-25
Payer: COMMERCIAL

## 2023-05-25 NOTE — TELEPHONE ENCOUNTER
----- Message from SNOW Huber sent at 5/25/2023  8:03 AM EDT -----  Regarding: RE: Patient appointment 06/01  She really should be seen here every 6 mons   ----- Message -----  From: Jeniffer Westbrook MA  Sent: 5/24/2023   8:51 AM EDT  To: SNOW Huber  Subject: Patient appointment 06/01                        Nirmala from the Hub called seeing If it was okay to push patient appointment out 6 months since patient already got results of her MRI and got a breast exam from her Gyn.    I informed the hub to let patient know I will ask Patrick and get back to patient.

## 2023-05-25 NOTE — TELEPHONE ENCOUNTER
Spoke to pt and let her know tiffany would still like to see her every 6 months       Pt stated understanding

## 2023-05-30 NOTE — PROGRESS NOTES
Chief Complaint: Cari Mathis is a 48 y.o. female who was seen in consultation at the request of JASVIR Kinney  for GENETIC EVALUATION, family history breast cancer and a followup visit    History of Present Illness:  19: seen by Dr Solis  Patient presents with management of breast cancer risk and family history breast cancer.   She noted no new masses, skin changes, nipple discharge, nipple changes prior to her most recent imaging.    Her most recent imaging includes the followin2017  [facility]  Mammo  [facility] [employer]  MAMMO SCREENING  Heterogeneously dense. I see no new or dominant masses, areas of architectural distortion or skin thickening. There is no evidence for axillary distortion or skin thickening. There is no evidence for axillary lymphadenopathy or nipple retraction. Scattered punctate monomorphic calcifications are seen throughout both breasts.  BIRADS Category 2, Benign.    2018  [facility]  Mammo  Cari Mathis  Screening mammogram.  Extremely dense.  There are multiple stable milk of calcium and punctate calcifications with diffuse/scattered distribution seen in both breasts. This is a typically benign pattern/process. No suspicious masses, suspicious microcalcifications or new areas of architectural distortion are identified. There has been no significant change from the prior.  BIRADS Category 2, Benign.    She has not had a breast biopsy in the past.  She has her uterus and ovaries, is pre-perimenopausal, and takes nor hormones.  Her family history includes the following:   She has 2 daughters, 2 sisters, 1 MA, 1 PA. Her mother had breast cancer age 32, is  and did not have genetic testing. Her PGM had breast ca age 70s.    2019:  In the interim,  Cari Mathis  has done well.  She has noted no changes in her breast exam. No new masses, skin changes, nipple changes, nipple discharge either breast.     Her most recent imaging  includes the following:  [facility] with [facility]D.  The breast is extremely dense.  Superior asymmetry does not persist.  On ultrasound no suspicious mass calcifications or other abnormalities.  BI-RADS 1.     MRI November 18, 2019 bilateral breast MRI [facility].  No findings suspicious in either breast BI-RADS 2.    12/1/2020:   In the interim,  Cari Mathis  has done well.  She has noted no changes in her breast exam. No new masses, skin changes, nipple changes, nipple discharge either breast.   She denies headache, bone pain, belly pain, cough, changes in vision or gait.    Her most recent imaging includes the following:  Bilateral breast MRI November 20, 2020: [facility] right breast no suspicious enhancing mass, left breast no suspicious enhancing mass.  BI-RADS 1  [facility] bilateral screening mammogram with tomosynthesis November 20, 2020: The breasts are heterogenous [place] dense.  BI-RADS 2    11/29/21  She returns today for follow up with no breast changes other than tenderness that is c/w hormone changes.  Her menses have become irregular.    Screening with tomosynthesis at [facility] on 11/8/21 was stable, BiRAds 2. (see full report below)    Breast MRI was completed today, 11/29/21, results pending.    6/1/2023 interval history  Presenting to the office today for routine follow-up.  She has no new breast complaints or concerns today.  Breast MRI on May 1, 2023 returned as BI-RADS 2.    Review of Systems:  Review of Systems   Endocrine: Negative for hot flashes.   All other systems reviewed and are negative.       Past Medical and Surgical History:  Breast Biopsy History:  Patient has not had a breast biopsy in the past.  Breast Cancer HIstory:  Patient does not have a past medical history of breast cancer.  Breast Operations, and year:  NONE   Obstetric/Gynecologic History:  Age menstrual periods began: 16  Patient  is premenopausal, first day of last period: 16  Number of pregnancies: 1  Number of live births: 2  Number of abortions or miscarriages: 0  Age of delivery of first child: 33  Patient breast fed, for the following lenth of time: 3 MONTHS   Length of time taking birth control pills: 5 YRS   Patient has never taken hormone replacement  PATIENT HAS BOTH OVARIES AND UTERUS.     Past Surgical History:   Procedure Laterality Date   • ABDOMINOPLASTY      with mesh   •  SECTION       Past Medical History:   Diagnosis Date   • Anxiety    • Fibrocystic breast changes, bilateral 2021   • Hypertension    • Kidney stones    • Migraines        Prior Hospitalizations, other than for surgery or childbirth, and year:  KIDNEY STONES     Social History     Socioeconomic History   • Marital status:    Tobacco Use   • Smoking status: Never     Passive exposure: Never   • Smokeless tobacco: Never   Vaping Use   • Vaping Use: Never used   Substance and Sexual Activity   • Alcohol use: No   • Drug use: Never   • Sexual activity: Yes     Partners: Male     Birth control/protection: None     Patient is .  Patient is employed full time with the following occupation:   Patient drinks 2 servings of caffeine per day.    Family History:  Family History   Problem Relation Age of Onset   • Hypertension Father    • Polymyalgia rheumatica Father    • Breast cancer Mother    • Cancer Mother 34        female cancer   • No Known Problems Brother    • No Known Problems Sister    • No Known Problems Son    • No Known Problems Daughter    • Melanoma Paternal Grandmother    • Skin cancer Paternal Grandmother    • No Known Problems Maternal Grandmother    • No Known Problems Maternal Aunt    • No Known Problems Paternal Aunt    • Breast cancer Paternal Aunt         p great aunt  unknown age    • Alcohol abuse Paternal Grandfather    • Stroke Paternal Grandfather    • BRCA 1/2 Neg Hx    • Colon cancer Neg Hx    •  "Endometrial cancer Neg Hx    • Ovarian cancer Neg Hx    • Uterine cancer Neg Hx        Vital Signs:  /90   Pulse 74   Ht 157.5 cm (62.01\")   Wt 51.3 kg (113 lb)   SpO2 100%   BMI 20.66 kg/m²      Medications:    Current Outpatient Medications:   •  fluticasone (FLONASE) 50 MCG/ACT nasal spray, 2 sprays into the nostril(s) as directed by provider Daily., Disp: , Rfl:   •  metoprolol succinate XL (TOPROL-XL) 50 MG 24 hr tablet, Take 1 tablet by mouth Daily., Disp: , Rfl:      Allergies:  Allergies   Allergen Reactions   • Nitrofurantoin Hives   • Ciprofloxacin-Hydrocortisone Hives   • Dilaudid [Hydromorphone Hcl] Hives   • Hibiclens [Chlorhexidine Gluconate] Rash       Physical Examination:  /90   Pulse 74   Ht 157.5 cm (62.01\")   Wt 51.3 kg (113 lb)   SpO2 100%   BMI 20.66 kg/m²      I reviewed physical exam, no changes noted    General Appearance:  Patient is in no distress.  She is well kept and has an thin build.   Psychiatric:  Patient with appropriate mood and affect. Alert and oriented to self, time, and place.    Breast, RIGHT:  small sized, 34B, symmetric with the contralateral side.  Breast skin is without erythema, edema, rashes.  There are no visible abnormalities upon inspection during the arm-raising maneuver or with hands on hips in the sitting position. There is no nipple retraction, discharge or nipple/areolar skin changes.There are no masses palpable in the sitting or supine positions.    Breast, LEFT:  small sized, 34B,  symmetric with the contralateral side.  Breast skin is without erythema, edema, rashes.  There are no visible abnormalities upon inspection during the arm-raising maneuver or with hands on hips in the sitting position. There is no nipple retraction, discharge or nipple/areolar skin changes.There are no masses palpable in the sitting or supine positions.    Lymphatic:  There is no axillary, cervical, infraclavicular, or supraclavicular adenopathy " bilaterally.    Imagin2021: [facility]  Is extremely dense.  There are round, Morphis and punctate calcifications with scattered distribution seen in both breasts.  There are no significant changes from prior exams.  Parenchyma include stable nodular asymmetries.  No suspicious masses, suspicious microcalcifications or areas of architectural distortion are identified.  Impression:  Calcifications in both breasts are benign negative.  Screening mammogram 1 year is recommended.  BI-RADS Category 2    2021 MRI breast bilateral [facility]  FINDINGS: There is extreme fibroglandular tissue. There is marked  background parenchymal enhancement, which limits the sensitivity for  detection of invasive malignancy and DCIS.  RIGHT BREAST:    No suspicious enhancing mass or area of non-mass enhancement is  identified.  The visualized axilla is within normal limits.   LEFT BREAST:    No suspicious enhancing mass or area of non-mass enhancement is  identified.  The visualized axilla is within normal limits.   EXTRAMAMMARY FINDINGS:   There are no abnormally enlarged internal mammary chain lymph nodes on  either side.     There is trace pleural fluid on the right.  IMPRESSION AND RECOMMENDATION: No MRI evidence of malignancy in either  breast. Recommend annual screening mammogram and breast MRI in one year.  BI-RADS Category 1:    2022 bilateral screening mammogram with [facility] at [facility]  The breasts are extremely dense.  There are round amorphous and punctuate calcifications with scattered distribution seen in both breast.  There are no significant changes from the prior exam.  The parenchyma include stable nodular asymmetries.  No suspicious masses suspicious microcalcifications or areas of architectural distortion identified.  Impression  Calcifications in both breast are benign negative.  BI-RADS 2    2023 breast MRI  [facility]  FINDINGS: There is heterogeneous fibroglandular tissue. There is minimal  background parenchymal enhancement.  RIGHT BREAST:    There is intrinsic T1 hyperintense signal within multiple ducts in the  right breast, consistent with proteinaceous and/or hemorrhagic contents.  No suspicious enhancing mass or area of non-mass enhancement is  identified.  The visualized axilla is within normal limits.   LEFT BREAST:    No suspicious enhancing mass or area of non-mass enhancement is  identified.  The visualized axilla is within normal limits.   EXTRAMAMMARY FINDINGS:   There are no pathologically enlarged internal mammary chain lymph nodes  on either side.     IMPRESSION AND RECOMMENDATION:  No MRI evidence of malignancy in either breast. Recommend correlation  with annual screening mammogram.  BI-RADS Category 2: Benign    Labs:    2019  Integrated BRACAnalysis  [employer]  BRCA1 and BRCA2 [employer]alysis  BRCA2 - c.4747A>T; UNCERTAIN CLINICAL SIGNIFICANCE      iDiDi[employer] Hereditary Cancer Screen  2019  [facility]  GENETIC RESULT: NEGATIVE - NO CLINICALLY SIGNIFICANT MUTATION IDENTIFIED  ADDITIONAL FINDINGS:  GENE    VARIANT(S) OF UNCERTAIN SIGNIFICANCE  BRCA2    c.4747A>T  MLH1    c. 896G>A        Assessment:    1- Benign breast changes    2- breast density  Extremely dense breast tissue on mammography    3- Family history of breast cancer,  CRA risk assessment (5 models)  As:8-25%  Mother age 32, , did not have genetic testing  PGM age 70s    4- Increased risk of breast cancer  My Risk- 5-2019  -VUS in BRCA2 c.4747A>T  -VUS in MLH1 c.896G>A    Discussion:  Her breast tissue is extremely dense.  Breast density describes how the breasts look on a mammogram.  Breast and connective tissue are denser than fat and this difference shows up on the mammogram.  Young women often have dense breasts.  As we age, breast become less dense.  Dense breast can make it harder to  find breast cancer on the mammogram.  Women with high breast density have an increased risk of breast cancer.  Educational materials regarding breast density were given and reviewed.    We discussed her increased risk and options for follow up.    Her most significant risk factors for breast cancer include the following: mother having had breast cancer, premenopausal  I calculated her lifetime risk of breast cancer using the CRA risk assessment (5 models)  As:8-25%  I calculated her lifetime risk of breast cancer using the Deisi model (not useful for women under the age of 35 years) as: 1.3%  With a lifetime risk of breast cancer greater than 20%, the current recommendations for screening include annual mammography and annual MRI, with clinical examination.     We also discussed that for a Deisi model 5 year risk greater than 1.7% or LCIS, and a life expectancy > 10 years, that we recommend risk reduction counseling with the medical oncologists about chemopreventive agents such as tamoxifen, raloxifene, or exemestane.       Dr Solis previously discussed and reviewed today the variant of uncertain significance detected in the my risk panel.  We discussed that there is no action needed for a variant of uncertain significance but it would be prudent to check in with a company on interval basis to see if there have been any updates on her variants  We discussed management options for individuals who are at increased risk (>20% lifetime risk):  1) High risk screening - Annual mammogram and annual breast MRI, alternating one test every 6 months, biannual clinical exam and monthly self breast exam. She meets criteria for high risk screening.  2) Chemoprevention with Tamoxifen, Raloxifene or Exemestane. These may reduce risk up to 50%. I reviewed that these particular medications are not without risks and the risk/benefit ratio must be considered carefully. Dr Solis has reviewed the option of talking with medical  oncology about risk reducing medications and she is certain that she is not interested in this consultation.  3) Risk reducing surgery such as prophylactic mastectomy  which may reduce risk by 90-95%. We discussed that this is a relatively radical strategy and is generally reserved for individuals with a known genetic mutation predisposing them to an increased risk (~50% risk) of breast cancer. She does not meet criteria for risk reducing surgery.   4) I discussed the importance of exercise and weight management as part of a risk reducing strategy, since increased BMI is associated with an increased risk of breast cancer.     She wishes to continue with MRI and mammographic surveillance together once annually.  We discussed the other option of q 6 mo imaging, she is interested in this plan.  We will complete screening mammogram in 12 months followed by exam and MRI in 18 months.    Plan:  1. Screening mammo in Nov followed by exam  2. Monthly self breast exams  3. rto if needed with new concerns      Cari and her  Ian are neighbors of Dr Solis in [facility].    SNOW Huber/transcription disclaimer:  Dictated using Dragon dictation

## 2023-06-01 ENCOUNTER — OFFICE VISIT (OUTPATIENT)
Dept: SURGERY | Facility: CLINIC | Age: 48
End: 2023-06-01

## 2023-06-01 VITALS
HEART RATE: 74 BPM | BODY MASS INDEX: 20.8 KG/M2 | DIASTOLIC BLOOD PRESSURE: 90 MMHG | SYSTOLIC BLOOD PRESSURE: 132 MMHG | HEIGHT: 62 IN | OXYGEN SATURATION: 100 % | WEIGHT: 113 LBS

## 2023-06-01 DIAGNOSIS — N60.11 FIBROCYSTIC BREAST CHANGES, BILATERAL: ICD-10-CM

## 2023-06-01 DIAGNOSIS — R92.2 BREAST DENSITY: ICD-10-CM

## 2023-06-01 DIAGNOSIS — Z12.31 ENCOUNTER FOR SCREENING MAMMOGRAM FOR MALIGNANT NEOPLASM OF BREAST: ICD-10-CM

## 2023-06-01 DIAGNOSIS — N60.12 FIBROCYSTIC BREAST CHANGES, BILATERAL: ICD-10-CM

## 2023-06-01 DIAGNOSIS — Z13.71 BRCA NEGATIVE: ICD-10-CM

## 2023-06-01 DIAGNOSIS — Z80.3 FH: BREAST CANCER: ICD-10-CM

## 2023-06-01 DIAGNOSIS — Z91.89 INCREASED RISK OF BREAST CANCER: Primary | ICD-10-CM

## 2023-09-21 ENCOUNTER — TELEPHONE (OUTPATIENT)
Dept: OBSTETRICS AND GYNECOLOGY | Age: 48
End: 2023-09-21
Payer: COMMERCIAL

## 2023-09-21 DIAGNOSIS — Z12.11 SCREEN FOR COLON CANCER: Primary | ICD-10-CM

## 2023-09-21 NOTE — TELEPHONE ENCOUNTER
Referral ordered!   No Residual Tumor Seen Histology Text: There were no malignant cells seen in the sections examined.

## 2023-11-16 ENCOUNTER — APPOINTMENT (OUTPATIENT)
Dept: WOMENS IMAGING | Facility: HOSPITAL | Age: 48
End: 2023-11-16
Payer: COMMERCIAL

## 2023-11-16 PROCEDURE — 77063 BREAST TOMOSYNTHESIS BI: CPT | Performed by: RADIOLOGY

## 2023-11-16 PROCEDURE — 77067 SCR MAMMO BI INCL CAD: CPT | Performed by: RADIOLOGY

## 2023-11-28 NOTE — PROGRESS NOTES
Bone Marrow Transplant Progress Note    Patient ID: Graciela is a 63 year old female.    Referred By:  Fly Robbins MD    Chief Complaint   Patient presents with   • Follow-up     Haploidentical SCT Day +481    Conditioning regimen: Busulfan, Fludarabine, Cytoxan + Cytoxan  Day 0: 11/11/19  Stem cell dose: 8.1 x 10(6) cells/kg   Hospitalization: 11/3/19 - 12/10/19     History of Present Illness:   Graciela is a 63 year old  female referred for Myelodysplastic syndrome. She was originally seen by Dr Martin for anemia and leukopenia. Workup would demonstrate gastritis with H. pylori, however, she would remain pancytopenic triple therapy, IV iron infusions, and vitamin B12. Bone marrow biopsy on 5/7/18 revealed myelodysplastic syndrome with excess blasts; disc megakaryocytes paresis and disc granulopoiesis was noted. Normal female Karotype. Therefore, she was ultimately referred to Dr Maria Isabel Rodas and treated with decitabine and then Vidaza. She presented to the BMT clinic to discuss hematopoietic stem cell transplantation for her myelodysplasia. Bone marrow biopsy on 11/2/19 with excess blasts. She decided to not pursue SCT at that time and continued to be pancytopenic while following up in the BMT clinic. She completed cycle 1 Vidaza 120 mg 8/19/19 - 8/27/19 for a total of 7 doses. Bone marrow biopsy on 10/3/19 showing increased blasts at 5%. 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     She underwent pretransplant workup and ultimately presented to [address] on 11/3/19 for Haplo-identical Stem Cell Transplantation with conditioning regimen: Busulfan, Fludarabine, Cytoxan + Cytoxan. She received stem cell infusion (8.1 x 10(6) cells/kg) on 11/11/19. Her hospitalization was complicated with nausea, diarrhea, fatigue, decreased oral intake, failure to thrive, mucositis, oral thrush, excoriation of skin around fistulas, peripheral edema, acute  Chief Complaint: Cari Mathis is a 48 y.o. female who was seen in consultation at the request of JASVIR Kinney  for  GENETIC EVALUATION, family history breast cancer and a followup visit    History of Present Illness:  19: seen by Dr Solis  Patient presents with management of breast cancer risk and family history breast cancer.   She noted no new masses, skin changes, nipple discharge, nipple changes prior to her most recent imaging.    Her most recent imaging includes the followin2017  [facility]  Mammo  Cari Toone  MAMMO SCREENING  Heterogeneously dense. I see no new or dominant masses, areas of architectural distortion or skin thickening. There is no evidence for axillary distortion or skin thickening. There is no evidence for axillary lymphadenopathy or nipple retraction. Scattered punctate monomorphic calcifications are seen throughout both breasts.  BIRADS Category 2, Benign.    2018  [facility]  Mammo  Cari Mathis  Screening mammogram.  Extremely dense.  There are multiple stable milk of calcium and punctate calcifications with diffuse/scattered distribution seen in both breasts. This is a typically benign pattern/process. No suspicious masses, suspicious microcalcifications or new areas of architectural distortion are identified. There has been no significant change from the prior.  BIRADS Category 2, Benign.    She has not had a breast biopsy in the past.  She has her uterus and ovaries, is pre-perimenopausal, and takes nor hormones.  Her family history includes the following:   She has 2 daughters, 2 sisters, 1 MA, 1 PA. Her mother had breast cancer age 32, is  and did not have genetic testing. Her PGM had breast ca age 70s.    2019:  In the interim,  Cari Mathis  has done well.  She has noted no changes in her breast exam. No new masses, skin changes, nipple changes, nipple discharge either breast.     Her most recent imaging  includes the following:  [facility] diagnostic [facility] with 3D.  The breast is extremely dense.  Superior asymmetry does not persist.  On ultrasound no suspicious mass calcifications or other abnormalities.  BI-RADS 1.     MRI November 18, 2019 bilateral breast MRI [facility].  No findings suspicious in either breast BI-RADS 2.    12/1/2020:   In the interim,  Cari Mathis  has done well.  She has noted no changes in her breast exam. No new masses, skin changes, nipple changes, nipple discharge either breast.   She denies headache, bone pain, belly pain, cough, changes in vision or gait.    Her most recent imaging includes the following:  Bilateral breast MRI November 20, 2020: [facility] right breast no suspicious enhancing mass, left breast no suspicious enhancing mass.  BI-RADS 1  [facility] bilateral screening mammogram with tomosynthesis November 20, 2020: The breasts are heterogenous Loy dense.  BI-RADS 2    11/29/21  She returns today for follow up with no breast changes other than tenderness that is c/w hormone changes.  Her menses have become irregular.    Screening with tomosynthesis at [facility] on 11/8/21 was stable, BiRAds 2. (see full report below)    Breast MRI was completed today, 11/29/21, results pending.    6/1/2023   Presenting to the office today for routine follow-up.  She has no new breast complaints or concerns today.  Breast MRI on May 1, 2023 returned as BI-RADS 2.    11/30/2023 interval history  Patient presenting to the office today for routine follow-up.  She has no new breast complaints or concerns today.  Mammogram from 11/16/2023 resulted as BI-RADS 1.      Review of Systems:  Review of Systems   Endocrine: Negative for hot flashes.   All other systems reviewed and are negative.       Past Medical and Surgical History:  Breast Biopsy History:  Patient has not had a breast biopsy in the past.  Breast Cancer  kidney injury, veno-occlusive disease, hypotension, pruritis, shortness of breath, fluid overload, bleeding from fistulas, epitaxis, GHVD, dysuria, and pancytopenia. She was discharged to [facility] on 12/10/19. Her post SCT course has been complicated with CMV viremia, BK virus, VOD, profound pancytopenia requiring frequent transfusions and GSCF injections, chronic pain in her neck, back, hips and wrist, failure to thrive, abdominal pain with diarrhea, dysuria and elevated ferritin level. Bone marrow biopsy on 7/28/20 demonstrated cellular marrow without evidence of previously diagnosed MDS. Her complications thus far include MRSA bacteremia and MRSA in the necrotic right neck mass and left wrist joint effusion, CMV, pancytopenia requiring PRBC and PLT transfusions along with GCSF, chronic back pain, orbital cellulitis with perioptic neuritis, left sphenoid sinusitis, ethmoid sinusitis, Klebsiella Pneumoniae in the urine, and MDRO klebsiella pneumoniae in the urine.      2/1/21: Graciela presented to the [facility] today for follow up after being discharged from the hospital on 1/27/21. She continues to experience low back pain that she describes as \"different pain.\" She does report that she is able to move around better. She also reports diffuse abdominal pain with palpation. She urinary frequency has worsened since being off Detrol but she reports she is picking up the medication from her local pharmacy today. On PE, she has a thick white/yellow coating on her tongue. Remains afebrile without shortness of breath, chest pain, palpitations, nausea or vomiting, headache, feeling dizzy or bleeding. Plan to continue pain medications and muscle relaxant. Begin nystatin suspension and BMT clinic working on setting up home care for Deferoxamine 2 grams IVPB daily.     2/10/21: Haplo Day +455. Graciela presents to the BMT clinic today for continued follow up and supportive care. She is currently receiving Deferoxamine 2  HIstory:  Patient does not have a past medical history of breast cancer.  Breast Operations, and year:  NONE   Obstetric/Gynecologic History:  Age menstrual periods began: 16  Patient is premenopausal, first day of last period: 16  Number of pregnancies: 1  Number of live births: 2  Number of abortions or miscarriages: 0  Age of delivery of first child: 33  Patient breast fed, for the following lenth of time: 3 MONTHS   Length of time taking birth control pills: 5 YRS   Patient has never taken hormone replacement  PATIENT HAS BOTH OVARIES AND UTERUS.     Past Surgical History:   Procedure Laterality Date    ABDOMINOPLASTY      with mesh     SECTION       Past Medical History:   Diagnosis Date    Allergic     Anemia     Anxiety     Fibrocystic breast changes, bilateral 2021    Hypertension     Kidney stones     Migraines     Urinary tract infection        Prior Hospitalizations, other than for surgery or childbirth, and year:  KIDNEY STONES     Social History     Socioeconomic History    Marital status:    Tobacco Use    Smoking status: Never     Passive exposure: Never    Smokeless tobacco: Never   Vaping Use    Vaping Use: Never used   Substance and Sexual Activity    Alcohol use: No    Drug use: Never    Sexual activity: Yes     Partners: Male     Birth control/protection: None     Patient is .  Patient is employed full time with the following occupation:   Patient drinks 2 servings of caffeine per day.    Family History:  Family History   Problem Relation Age of Onset    Hypertension Father     Polymyalgia rheumatica Father     Breast cancer Mother 32    Cancer Mother 34        female cancer    No Known Problems Brother     No Known Problems Sister     No Known Problems Son     No Known Problems Daughter     Melanoma Paternal Grandmother 70    Skin cancer Paternal Grandmother     No Known Problems Maternal Grandmother     No Known Problems Maternal Aunt     No Known  grams IVPB daily for 5 days. Today she is reporting chronic low back pain despite kyphoplasty which is triggered by standing and walking. Her BP is low and she reports fatigue and feeling dizzy with standing. She also endorses continued urinary incontinence despite Detrol but no hematuria or vaginal discharge.  Remains afebrile without shortness of breath, chest pain, palpitations, rash, headache or bleeding. CMV PCR from 2/8/21 was <150.     2/22/21: Haplo Day +467. Graciela presents to the BMT clinic today for continued follow up and supportive care. Since last visit, she completed a 5 day course of Deferoxamine; she continues on monthly B12 injections with last dose administered 2/9/21. This morning, Graciela reports continued, chronic back pain, decreased appetite, scalp and skin pruritus, and increased fatigue during the day. She has been taking Tramadol 100 mg PO BID, however, this was prescribed to her as 50 mg PO q6h PRN. We revisited adverse effects of pain medications, including but not limited to sedation, fatigue, constipation, etc, as well as risks of falls and infection with increased sedation. Afebrile; vitals stable. Denies fevers, chills, vision changes, SOB, unintentional weight gain, peripheral edema, and skin changes. No evidence of VOD or GVHD. However, noted with increasing acute transaminitis. No RUQ pain to tenderness and no evidence of fluid retention. She is on Voriconazole and CMV last assess 2/8/21 (viral load <150). Plan to reusme Megace, begin Ketoconazole shampoo for scalp pruritus and flaking, transfuse 1 unit PRBCs in [facility], and will require targeted physical therapy for lumbar back rehabilitation.     3/8/21:  Haplo Day +481.  Graciela presents to the BMT office today for continued follow up and supportive care needs. Last visit, she received a DEXA scan, which was consistent with osteoporosis. She remains off of Tacrolimus, Mycophenolate, and systemic steroids. Today she presents in her  "Problems Paternal Aunt     Breast cancer Paternal Aunt         p great aunt  unknown age     Alcohol abuse Paternal Grandfather     Stroke Paternal Grandfather     BRCA 1/2 Neg Hx     Colon cancer Neg Hx     Endometrial cancer Neg Hx     Ovarian cancer Neg Hx     Uterine cancer Neg Hx        Vital Signs:  /86   Pulse 80   Ht 157.5 cm (62.01\")   Wt 49.9 kg (110 lb)   SpO2 98%   BMI 20.11 kg/m²      Medications:    Current Outpatient Medications:     fluticasone (FLONASE) 50 MCG/ACT nasal spray, 2 sprays into the nostril(s) as directed by provider Daily., Disp: , Rfl:     metoprolol succinate XL (TOPROL-XL) 50 MG 24 hr tablet, Take 1 tablet by mouth Daily., Disp: , Rfl:      Allergies:  Allergies   Allergen Reactions    Nitrofurantoin Hives    Ciprofloxacin-Hydrocortisone Hives    Dilaudid [Hydromorphone Hcl] Hives    Hibiclens [Chlorhexidine Gluconate] Rash       Physical Examination:  /86   Pulse 80   Ht 157.5 cm (62.01\")   Wt 49.9 kg (110 lb)   SpO2 98%   BMI 20.11 kg/m²      I reviewed physical exam, no changes noted    General Appearance:  Patient is in no distress.  She is well kept and has an thin build.   Psychiatric:  Patient with appropriate mood and affect. Alert and oriented to self, time, and place.    Breast, RIGHT:  small sized, 34B, symmetric with the contralateral side.  Breast skin is without erythema, edema, rashes.  There are no visible abnormalities upon inspection during the arm-raising maneuver or with hands on hips in the sitting position. There is no nipple retraction, discharge or nipple/areolar skin changes.There are no masses palpable in the sitting or supine positions.    Breast, LEFT:  small sized, 34B,  symmetric with the contralateral side.  Breast skin is without erythema, edema, rashes.  There are no visible abnormalities upon inspection during the arm-raising maneuver or with hands on hips in the sitting position. There is no nipple retraction, discharge or " usual state of health. Reports that her pain is well-controlled. She states she has been takingTramadol 100 mg twice daily. Her appetite is also improved after being started on Megace. However, she reports continued, generalized pruritus, especially to the scalp. Additionally, she reports difficulty maintaining control of her bladder while attempting to ambulate to the bathroom; she is currently on Oxybutynin. No transfusions requirements this afternoon. Afebrile; vitals otherwise stable. No evidence of GVHD or VOD. Plan to increase Oxybutynin to 10 mg PO BID and continue Ketoconazole shampoo.     Review of Systems   Constitutional: Positive for activity change. Negative for appetite change, chills, fatigue and fever.   HENT: Negative.    Eyes: Negative.  Negative for photophobia, pain, discharge, redness, itching and visual disturbance.   Respiratory: Negative.    Cardiovascular: Negative for chest pain, palpitations and leg swelling.   Gastrointestinal: Positive for abdominal pain (Chronic) and diarrhea (Chronic). Negative for abdominal distention, anal bleeding, blood in stool, constipation, nausea and vomiting.   Endocrine: Negative.    Genitourinary: Positive for frequency. Negative for decreased urine volume, difficulty urinating, dysuria, flank pain, genital sores, hematuria, pelvic pain and urgency.   Musculoskeletal: Positive for arthralgias, back pain, gait problem and joint swelling. Negative for myalgias.   Skin: Positive for color change. Negative for rash and wound.        Pruritus   Allergic/Immunologic: Positive for immunocompromised state.   Neurological: Positive for weakness. Negative for dizziness, tremors, light-headedness, numbness and headaches.   Hematological: Negative.    Psychiatric/Behavioral: Negative.      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        Past Surgical History:   Procedure Laterality Date   • Appendectomy  2009   • Appendectomy      peritonectomy,cytoreduction with hipec   • Bone marrow biopsy  11/02/2018   • Colon surgery  2014   • Ileostomy      6/19/2009 and reversed the same year, 9/25/2014, 11/20/2015 reversed     Social History     Tobacco Use   • Smoking status: Former Smoker     Packs/day: 0.00   • Smokeless tobacco: Former User   Substance Use Topics   • Alcohol use: No     Frequency: Never     Allergies   Allergen Reactions   • Sulfa Antibiotics ANAPHYLAXIS   • Unknown PRURITUS     Sulfonamides     Current Outpatient Medications   Medication Sig Dispense Refill   • traMADol (ULTRAM) 50 MG tablet Take 1 tablet by mouth every 6 hours as needed for Pain. 42 tablet 0   • voriconazole (VFEND) 200 MG tablet Take 1 tablet by mouth 2 times daily. 60 tablet 2   • megestrol (MEGACE) 40 mg/mL suspension Take 10 mLs by mouth daily. 480 mL 1   • ketoconazole (NIZORAL) 2 % shampoo Apply to wet hair and lather for 5 minutes. Rinse thoroughly with water. Avoid getting shampoo in eyes. 120 mL 0   • tolterodine (DETROL LA) 4 MG 24 hr capsule TAKE 1 CAPSULE BY MOUTH DAILY. DO NOT. START BEFORE JANUARY 27, 2021 90 capsule 3   • diphenoxylate-atropine (LOMOTIL) 2.5-0.025 MG tablet TAKE 2 TABLETS BY MOUTH FOUR TIMES DAILY AS NEEDED FOR DIARRHEA 90 tablet 3   • letermovir (PREVYMIS) 480 MG tablet Take 1 tablet by mouth daily. 30 tablet 3   • loperamide (IMODIUM) 2 MG capsule Take 2 capsules by mouth 4 times daily as needed for Diarrhea. 240 capsule 0   • acyclovir (ZOVIRAX) 800 MG tablet Take 1 tablet by mouth every 12 hours. 60 tablet 3   •  nipple/areolar skin changes.There are no masses palpable in the sitting or supine positions.    Lymphatic:  There is no axillary, cervical, infraclavicular, or supraclavicular adenopathy bilaterally.    Imagin2021: [facility]  Is extremely dense.  There are round, Morphis and punctate calcifications with scattered distribution seen in both breasts.  There are no significant changes from prior exams.  Parenchyma include stable nodular asymmetries.  No suspicious masses, suspicious microcalcifications or areas of architectural distortion are identified.  Impression:  Calcifications in both breasts are benign negative.  Screening mammogram 1 year is recommended.  BI-RADS Category 2    2021 MRI breast bilateral [facility]  FINDINGS: There is extreme fibroglandular tissue. There is marked  background parenchymal enhancement, which limits the sensitivity for  detection of invasive malignancy and DCIS.  RIGHT BREAST:    No suspicious enhancing mass or area of non-mass enhancement is  identified.  The visualized axilla is within normal limits.   LEFT BREAST:    No suspicious enhancing mass or area of non-mass enhancement is  identified.  The visualized axilla is within normal limits.   EXTRAMAMMARY FINDINGS:   There are no abnormally enlarged internal mammary chain lymph nodes on  either side.     There is trace pleural fluid on the right.  IMPRESSION AND RECOMMENDATION: No MRI evidence of malignancy in either  breast. Recommend annual screening mammogram and breast MRI in one year.  BI-RADS Category 1:    2022 bilateral screening mammogram with [facility] at [facility]  The breasts are extremely dense.  There are round amorphous and punctuate calcifications with scattered distribution seen in both breast.  There are no significant changes from the prior exam.  The parenchyma include stable nodular asymmetries.  No suspicious masses  pantoprazole (PROTONIX) 40 MG tablet Take 1 tablet by mouth nightly. 30 tablet 3   • oxybutynin (DITROPAN) 5 MG tablet Take 5 mg by mouth 2 times daily.     • DISPENSE Drug: NaCl 0.9% Flush  Dose: 10 mL  Route: IV Flush  Sig: Use 2-3x per day for SASH or as needed per protocol 10 Syringe 3   • ondansetron (ZOFRAN) 4 MG tablet Take 1 tablet by mouth every 8 hours as needed for Nausea. PRN 30 tablet 3     No current facility-administered medications for this visit.      There were no vitals filed for this visit.    Laboratory Findings  Component      Latest Ref Rng & Units 3/8/2021           9:45 AM   WBC      4.2 - 11.0 K/mcL 4.0 (L)   RBC      4.00 - 5.20 mil/mcL 2.19 (L)   HGB      12.0 - 15.5 g/dL 7.8 (L)   HCT      36.0 - 46.5 % 24.1 (L)   MCV      78.0 - 100.0 fl 110.0 (H)   MCH      26.0 - 34.0 pg 35.6 (H)   MCHC      32.0 - 36.5 g/dL 32.4   RDW-CV      11.0 - 15.0 % 23.6 (H)   RDW-SD      39.0 - 50.0 fL 94.2 (H)   PLT      140 - 450 K/mcL 36 (L)   NRBC      <=0 /100 WBC 0   Neutrophil      % 52   LYMPH      % 34   MONO      % 10   EOSIN      % 3   BASO      % 1   Immature Granulocytes      % 0   Absolute Neutrophil      1.8 - 7.7 K/mcL 2.1   Absolute Lymph      1.0 - 4.0 K/mcL 1.4   Absolute Mono      0.3 - 0.9 K/mcL 0.4   Absolute Eos      0.0 - 0.5 K/mcL 0.1   Absolute Baso      0.0 - 0.3 K/mcL 0.0   Absolute Immature Granulocytes      0.0 - 0.2 K/mcL 0.0     Component      Latest Ref Rng & Units 3/8/2021           9:45 AM   Sodium      135 - 145 mmol/L 140   Potassium      3.4 - 5.1 mmol/L 3.5   Chloride      98 - 107 mmol/L 107   CO2      21 - 32 mmol/L 26   ANION GAP      10 - 20 mmol/L 11   Glucose      65 - 99 mg/dL 94   BUN      6 - 20 mg/dL 18   Creatinine      0.51 - 0.95 mg/dL 0.85   Glomerular Filtration Rate      >90 mL/min/1.73m2 73 (L)   BUN/CREATININE RATIO      7 - 25 21   CALCIUM      8.4 - 10.2 mg/dL 8.9   TOTAL BILIRUBIN      0.2 - 1.0 mg/dL 2.3 (H)   AST/SGOT      <=37 Units/L 57 (H)  suspicious microcalcifications or areas of architectural distortion identified.  Impression  Calcifications in both breast are benign negative.  BI-RADS 2    5/1/2023 breast MRI [facility]  FINDINGS: There is heterogeneous fibroglandular tissue. There is minimal  background parenchymal enhancement.  RIGHT BREAST:    There is intrinsic T1 hyperintense signal within multiple ducts in the  right breast, consistent with proteinaceous and/or hemorrhagic contents.  No suspicious enhancing mass or area of non-mass enhancement is  identified.  The visualized axilla is within normal limits.   LEFT BREAST:    No suspicious enhancing mass or area of non-mass enhancement is  identified.  The visualized axilla is within normal limits.   EXTRAMAMMARY FINDINGS:   There are no pathologically enlarged internal mammary chain lymph nodes  on either side.     IMPRESSION AND RECOMMENDATION:  No MRI evidence of malignancy in either breast. Recommend correlation  with annual screening mammogram.  BI-RADS Category 2: Benign    11/16/2023 bilateral screening mammogram at [facility]  The breasts are extremely dense, which lowers the sensitivity of mammography.  No suspicious masses, significant calcifications or other abnormalities are seen.  IMPRESSION:  There is no mammographic evidence of malignancy.  Screening mammogram in 1 year is recommended.  The patient was mailed a notification letter.  BI-RADS Category 1: Negative      Labs:    09/30/2019  [facility]  Genetic   [employer]  BRCA1 and BRCA2 Aynalysis  BRCA2 - c.4747A>T; UNCERTAIN CLINICAL SIGNIFICANCE      [employer] Hereditary Cancer Screen  05/17/2019  [employer]  GENETIC RESULT: NEGATIVE - NO CLINICALLY SIGNIFICANT MUTATION IDENTIFIED  ADDITIONAL FINDINGS:  GENE    VARIANT(S) OF UNCERTAIN SIGNIFICANCE  BRCA2    c.4747A>T  MLH1    c. 896G>A        Assessment:    1- Benign breast changes    2- breast density  Extremely dense breast tissue on    ALT/SGPT      <64 Units/L 49   ALK PHOSPHATASE      45 - 117 Units/L 899 (H)   Albumin      3.6 - 5.1 g/dL 3.2 (L)   TOTAL PROTEIN      6.4 - 8.2 g/dL 6.2 (L)   GLOBULIN      2.0 - 4.0 g/dL 3.0   A/G Ratio, Serum      1.0 - 2.4 1.1     Component      Latest Ref Rng & Units 3/8/2021 3/8/2021           9:45 AM  9:45 AM   PHOSPHORUS      2.4 - 4.7 mg/dL 4.0    MAGNESIUM      1.7 - 2.4 mg/dL  1.8     Component      Latest Ref Rng & Units 3/8/2021           9:45 AM   VITAMIND, 25 HYDROXY      30.0 - 100.0 ng/mL 7.0 (L)     Component      Latest Ref Rng & Units 3/8/2021          12:46 PM   INSTRUMENT IONIZED CALCIUM      1.15 - 1.29 mmol/L 1.27   NORMALIZED CALCIUM      1.15 - 1.29 mmol/L 1.21     Physical Exam  Vitals signs reviewed.   Constitutional:       General: She is not in acute distress.     Appearance: Normal appearance. She is underweight. She is not ill-appearing or diaphoretic.   HENT:      Nose: Nose normal. No congestion or rhinorrhea.      Mouth/Throat:      Mouth: Mucous membranes are moist.      Pharynx: Oropharynx is clear. No oropharyngeal exudate or posterior oropharyngeal erythema.   Eyes:      General: Scleral icterus (Faint) present.      Conjunctiva/sclera: Conjunctivae normal.   Cardiovascular:      Rate and Rhythm: Normal rate and regular rhythm.      Heart sounds: Normal heart sounds.   Pulmonary:      Effort: Pulmonary effort is normal. No respiratory distress.      Breath sounds: Normal breath sounds.   Abdominal:      General: Abdomen is flat. Bowel sounds are normal.      Palpations: Abdomen is soft.      Tenderness: There is abdominal tenderness (chronic). There is no right CVA tenderness, left CVA tenderness, guarding or rebound.      Comments: Abdominal ostomy   Musculoskeletal:         General: Tenderness (Lumbar back pain; no stepoff) present. No swelling.      Left hip: She exhibits normal range of motion, normal strength, no tenderness, no bony tenderness and no swelling.       mammography    3- Family history of breast cancer,  CRA risk assessment (5 models)  As:8-25%  Mother age 32, , did not have genetic testing  PGM age 70s    4- Increased risk of breast cancer  My Risk- 5-2019  -VUS in BRCA2 [id number]  -VUS in MLH1 c.896G>A    Discussion:  Her breast tissue is extremely dense.  Breast density describes how the breasts look on a mammogram.  Breast and connective tissue are denser than fat and this difference shows up on the mammogram.  Young women often have dense breasts.  As we age, breast become less dense.  Dense breast can make it harder to find breast cancer on the mammogram.  Women with high breast density have an increased risk of breast cancer.  Educational materials regarding breast density were given and reviewed.    We discussed her increased risk and options for follow up.    Her most significant risk factors for breast cancer include the following: mother having had breast cancer, premenopausal  I calculated her lifetime risk of breast cancer using the CRA risk assessment (5 models)  As:8-25%  I calculated her lifetime risk of breast cancer using the Deisi model (not useful for women under the age of 35 years) as: 1.3%  With a lifetime risk of breast cancer greater than 20%, the current recommendations for screening include annual mammography and annual MRI, with clinical examination.     We also discussed that for a Deisi model 5 year risk greater than 1.7% or LCIS, and a life expectancy > 10 years, that we recommend risk reduction counseling with the medical oncologists about chemopreventive agents such as tamoxifen, raloxifene, or exemestane.       Dr Solis previously discussed and reviewed today the variant of uncertain significance detected in the my risk panel.  We discussed that there is no action needed for a variant of uncertain significance but it would be prudent to check in with a company on interval basis to see if there have been any updates on her  Right lower leg: No edema.      Left lower leg: No edema.   Skin:     General: Skin is warm.      Coloration: Skin is pale. Skin is not jaundiced.      Findings: No erythema or rash.      Comments: Dry, flaking specks on scalp and hair   Neurological:      General: No focal deficit present.      Mental Status: She is alert and oriented to person, place, and time.      Cranial Nerves: No cranial nerve deficit.      Sensory: No sensory deficit.      Motor: Weakness present.      Gait: Gait abnormal.   Psychiatric:         Mood and Affect: Mood normal.         Behavior: Behavior normal.       Discussion/Impression  Graciela is a 63 year old female referred for Myelodysplastic syndrome s/p Haplo-identical Stem Cell Transplantation. She received conditioning chemotherapy with  Busulfan, Fludarabine, Cytoxan + Cytoxan and underwent stem cell infusion (8.1 x 10(6) cells/kg) on 11/11/19. Her hospitalization was complicated with nausea, diarrhea, fatigue, decreased oral intake, failure to thrive, mucositis, oral thrush, excoriation of skin around fistulas, peripheral edema, acute kidney injury, veno-occlusive disease, hypotension, pruritis, shortness of breath, fluid overload, bleeding from fistulas, epitaxis, GHVD, dysuria, and pancytopenia. She was discharged to [facility] on 12/10/19. Her post Day-100 SCT course has been complicated with CMV viremia, BK virus, VOD, profound pancytopenia requiring frequent transfusions and GSCF injections, chronic pain in her neck, back, hips and wrist, failure to thrive, abdominal pain with diarrhea, dysuria and elevated ferritin level.      Bone marrow biopsy on 7/28/20 demonstrated cellular marrow without evidence of previously diagnosed MDS.    KPS Score 70%    Plan  -ID prophylaxis    -Letermovir 480 mg PO qd   -Acyclovir 800 mg PO q12h   -Voriconazole 200 mg pO q12h   -Ketoconazole shampoo daily  -GI/Electrolytes/Nutrition   -Megace 400 mg PO qd (2/23/21)   -B12 sub-q  variants  We discussed management options for individuals who are at increased risk (>20% lifetime risk):  1) High risk screening - Annual mammogram and annual breast MRI, alternating one test every 6 months, biannual clinical exam and monthly self breast exam. She meets criteria for high risk screening.  2) Chemoprevention with Tamoxifen, Raloxifene or Exemestane. These may reduce risk up to 50%. I reviewed that these particular medications are not without risks and the risk/benefit ratio must be considered carefully. Dr Solis has reviewed the option of talking with medical oncology about risk reducing medications and she is certain that she is not interested in this consultation.  3) Risk reducing surgery such as prophylactic mastectomy  which may reduce risk by 90-95%. We discussed that this is a relatively radical strategy and is generally reserved for individuals with a known genetic mutation predisposing them to an increased risk (~50% risk) of breast cancer. She does not meet criteria for risk reducing surgery.   4) I discussed the importance of exercise and weight management as part of a risk reducing strategy, since increased BMI is associated with an increased risk of breast cancer.     She wishes to continue with MRI and mammographic surveillance together once annually.  We discussed the other option of q 6 mo imaging, she is interested in this plan.  We will complete screening mammogram in 12 months followed by exam and MRI in 18 months.    Plan:  1. Breast MRI May followed by exam  2. Monthly self breast exams  3. rto if needed with new concerns      Cari and her  Ian are neighbors of Dr Solis in [facility].    SNOW Huber/transcription disclaimer:  Dictated using Dragon dictation                 injections monthly   -Begin Vitamin D supplementation (3/8/21)   -Continue Mg + protein  -Trend LFTs and t.bili   -Voriconazole level: 1.3 (2/22/21)   -CMV quant (2/8/21): <150  -Transfuse PRBCs leukoreduced and irradiated for Hgb <7.0  -Transfuse Platelets leukoreduced and irradiated for plt <10, or <20 if bleeding   -Continue weekly labs Q[facility] at [facility]  -RTC in 1 week    Time  Total chart review and face to face time spent with patient 35 minutes.  Greater than 50% of the total time was spent counseling and/or coordinating care.

## 2023-11-30 ENCOUNTER — OFFICE VISIT (OUTPATIENT)
Dept: SURGERY | Facility: CLINIC | Age: 48
End: 2023-11-30
Payer: COMMERCIAL

## 2023-11-30 VITALS
DIASTOLIC BLOOD PRESSURE: 86 MMHG | SYSTOLIC BLOOD PRESSURE: 132 MMHG | BODY MASS INDEX: 20.24 KG/M2 | OXYGEN SATURATION: 98 % | HEIGHT: 62 IN | HEART RATE: 80 BPM | WEIGHT: 110 LBS

## 2023-11-30 DIAGNOSIS — R92.30 BREAST DENSITY: Primary | ICD-10-CM

## 2023-11-30 DIAGNOSIS — Z80.3 FH: BREAST CANCER: ICD-10-CM

## 2023-11-30 DIAGNOSIS — Z91.89 INCREASED RISK OF BREAST CANCER: ICD-10-CM

## 2023-11-30 PROCEDURE — 99213 OFFICE O/P EST LOW 20 MIN: CPT | Performed by: NURSE PRACTITIONER

## 2024-05-16 ENCOUNTER — APPOINTMENT (OUTPATIENT)
Dept: OTHER | Facility: HOSPITAL | Age: 49
End: 2024-05-16
Payer: COMMERCIAL

## 2024-05-16 ENCOUNTER — HOSPITAL ENCOUNTER (OUTPATIENT)
Dept: MRI IMAGING | Facility: HOSPITAL | Age: 49
Discharge: HOME | End: 2024-05-16
Payer: COMMERCIAL

## 2024-05-16 ENCOUNTER — PREP FOR SURGERY (OUTPATIENT)
Dept: OTHER | Facility: HOSPITAL | Age: 49
End: 2024-05-16
Payer: COMMERCIAL

## 2024-05-16 ENCOUNTER — TELEPHONE (OUTPATIENT)
Dept: SURGERY | Facility: CLINIC | Age: 49
End: 2024-05-16
Payer: COMMERCIAL

## 2024-05-16 DIAGNOSIS — Z91.89 INCREASED RISK OF BREAST CANCER: ICD-10-CM

## 2024-05-16 DIAGNOSIS — R92.8 ABNORMAL FINDING ON BREAST IMAGING: Primary | ICD-10-CM

## 2024-05-16 LAB — CREAT BLDA-MCNC: 0.7 MG/DL (ref 0.6–1.3)

## 2024-05-16 PROCEDURE — A9577 INJ MULTIHANCE: HCPCS | Performed by: NURSE PRACTITIONER

## 2024-05-16 PROCEDURE — 0 GADOBENATE DIMEGLUMINE 529 MG/ML SOLUTION: Performed by: NURSE PRACTITIONER

## 2024-05-16 PROCEDURE — 82565 ASSAY OF CREATININE: CPT

## 2024-05-16 PROCEDURE — 77049 MRI BREAST C-+ W/CAD BI: CPT

## 2024-05-16 RX ADMIN — GADOBENATE DIMEGLUMINE 10 ML: 529 INJECTION, SOLUTION INTRAVENOUS at 10:30

## 2024-05-16 NOTE — TELEPHONE ENCOUNTER
Called and discussed recent MRI with patient.  It suggested an ultrasound to see if the mass in the right breast could be seen and if so then do an ultrasound-guided biopsy.  If not the need to do an MRI biopsy.  I placed an order for right limited breast ultrasound and an order for a right breast ultrasound-guided biopsy.  Please set this up for the patient and move her appointment with me to after

## 2024-05-16 NOTE — TELEPHONE ENCOUNTER
I have spoke to pt and got her scheduled for a breast us here at the hospital for tomorrow at 745am     Pt stated understanding

## 2024-05-17 ENCOUNTER — HOSPITAL ENCOUNTER (OUTPATIENT)
Dept: ULTRASOUND IMAGING | Facility: HOSPITAL | Age: 49
Discharge: HOME | End: 2024-05-17
Admitting: NURSE PRACTITIONER
Payer: COMMERCIAL

## 2024-05-17 PROCEDURE — 76642 ULTRASOUND BREAST LIMITED: CPT

## 2024-05-20 ENCOUNTER — TELEPHONE (OUTPATIENT)
Dept: SURGERY | Facility: CLINIC | Age: 49
End: 2024-05-20
Payer: COMMERCIAL

## 2024-05-20 ENCOUNTER — PREP FOR SURGERY (OUTPATIENT)
Dept: OTHER | Facility: HOSPITAL | Age: 49
End: 2024-05-20
Payer: COMMERCIAL

## 2024-05-20 DIAGNOSIS — R92.8 ABNORMAL FINDING ON BREAST IMAGING: Primary | ICD-10-CM

## 2024-05-20 NOTE — TELEPHONE ENCOUNTER
Spoke to pt and got her scheduled for her MRI bx here at the [facility] on 06/19 be here at 645am for a 745am and then scheduled her appt with tiffany morrow on 07/05 @ 1040     Pt stated understanding   Pt will ask for valium when she gets to  and is aware she will need a  if she is going to take this medication

## 2024-06-05 ENCOUNTER — OFFICE VISIT (OUTPATIENT)
Dept: OBSTETRICS AND GYNECOLOGY | Age: 49
End: 2024-06-05
Payer: COMMERCIAL

## 2024-06-05 ENCOUNTER — TELEPHONE (OUTPATIENT)
Dept: SURGERY | Facility: CLINIC | Age: 49
End: 2024-06-05
Payer: COMMERCIAL

## 2024-06-05 VITALS
BODY MASS INDEX: 20.2 KG/M2 | HEIGHT: 62 IN | DIASTOLIC BLOOD PRESSURE: 66 MMHG | SYSTOLIC BLOOD PRESSURE: 120 MMHG | WEIGHT: 109.8 LBS

## 2024-06-05 DIAGNOSIS — Z01.419 WELL WOMAN EXAM WITH ROUTINE GYNECOLOGICAL EXAM: Primary | ICD-10-CM

## 2024-06-05 DIAGNOSIS — Z80.3 FAMILY HISTORY OF BREAST CANCER: ICD-10-CM

## 2024-06-05 DIAGNOSIS — R92.30 BREAST DENSITY: Primary | ICD-10-CM

## 2024-06-05 PROCEDURE — 99396 PREV VISIT EST AGE 40-64: CPT | Performed by: STUDENT IN AN ORGANIZED HEALTH CARE EDUCATION/TRAINING PROGRAM

## 2024-06-05 NOTE — PROGRESS NOTES
[facility]   Obstetrics and Gynecology   Routine Annual Visit    2024    Patient: Cari Mathis          MR#:[id number]    History of Present Illness    Chief Complaint   Patient presents with    Annual Exam     AE today, Last AE 2023, Last pap 2022 w/pap nml and HPV neg, MG 2023, No problems today       49 y.o. female  who presents for annual exam.  No menses since 2023.  Rare hot flash that is manageable.    Studies reviewed:  -US Breast Right Limited (2024 08:22) - Increased risk of breast cancer, sees Breast Surgery, alternating breast MRI and mammo, MRI-guided biopsy scheduled 24  -SCANNED - LABS (2019) fhx mother with ovarian cancer, My[facility] testing neg  -Last colonoscopy 3/2024 normal, repeat 10 yrs but patient will confirm  -IGP, Apt HPV,rfx 16 / 18,45 (2022 08:28) NIL, HPV neg    Obstetric History:  OB History          1    Para   1    Term   0       1    AB        Living   2         SAB        IAB        Ectopic        Molar        Multiple   1    Live Births   2               Menstrual History:     Patient's last menstrual period was 2023.       Sexual History:    Sexually active with  only, declines STD screen       Social History:   2 kids  Homemaker    ________________________________________  Patient Active Problem List   Diagnosis    Increased risk of breast cancer    BRCA negative    Fibrocystic breast changes, bilateral    Breast density    FH: breast cancer     Past Medical History:   Diagnosis Date    Allergic     Anemia     Anxiety     Fibrocystic breast changes, bilateral 2021    Hypertension     Kidney stones     Migraines     Urinary tract infection      Past Surgical History:   Procedure Laterality Date    ABDOMINOPLASTY      with mesh     SECTION       Social History     Tobacco Use   Smoking Status Never    Passive exposure: Never   Smokeless Tobacco Never     Family History  "  Problem Relation Age of Onset    Hypertension Father     Polymyalgia rheumatica Father     Ovarian cancer Mother     Breast cancer Mother 32    No Known Problems Brother     No Known Problems Sister     No Known Problems Son     No Known Problems Daughter     Alcohol abuse Paternal Grandfather     Stroke Paternal Grandfather     Melanoma Paternal Grandmother 70    Skin cancer Paternal Grandmother     No Known Problems Maternal Grandmother     No Known Problems Maternal Aunt     No Known Problems Paternal Aunt     Breast cancer Paternal Aunt         p great aunt  unknown age     BRCA 1/2 Neg Hx     Colon cancer Neg Hx     Endometrial cancer Neg Hx     Uterine cancer Neg Hx      Prior to Admission medications    Medication Sig Start Date End Date Taking? Authorizing Provider   fluticasone (FLONASE) 50 MCG/ACT nasal spray 2 sprays into the nostril(s) as directed by provider Daily.   Yes ProviderVishnu MD   metoprolol succinate XL (TOPROL-XL) 50 MG 24 hr tablet Take 1 tablet by mouth Daily. 11/18/22  Yes ProviderVishnu MD     ________________________________________    Current contraception: post menopausal status  History of abnormal Pap smear: no  Family history of uterine or ovarian cancer: yes - see above  Family History of colon cancer/colon polyps: no  History of abnormal mammogram: yes - see above    The following portions of the patient's history were reviewed and updated as appropriate: allergies, current medications, past family history, past medical history, past social history, past surgical history, and problem list.    Review of Systems   All other systems reviewed and are negative.           Objective     /66   Ht 157.5 cm (62\")   Wt 49.8 kg (109 lb 12.8 oz)   LMP 01/01/2023   Breastfeeding No   BMI 20.08 kg/m²    BP Readings from Last 3 Encounters:   06/05/24 120/66   11/30/23 132/86   07/06/23 110/70      Wt Readings from Last 3 Encounters:   06/05/24 49.8 kg (109 lb 12.8 oz) " "  11/30/23 49.9 kg (110 lb)   07/06/23 49.9 kg (110 lb)        BMI: Estimated body mass index is 20.08 kg/m² as calculated from the following:    Height as of this encounter: 157.5 cm (62\").    Weight as of this encounter: 49.8 kg (109 lb 12.8 oz).    Physical Exam  Vitals and nursing note reviewed.   Constitutional:       General: She is not in acute distress.     Appearance: Normal appearance.   HENT:      Head: Normocephalic and atraumatic.   Eyes:      Extraocular Movements: Extraocular movements intact.   Cardiovascular:      Rate and Rhythm: Normal rate and regular rhythm.      Pulses: Normal pulses.      Heart sounds: No murmur heard.  Pulmonary:      Effort: Pulmonary effort is normal. No respiratory distress.      Breath sounds: Normal breath sounds.   Chest:   Breasts:     Right: Normal. No mass, nipple discharge, skin change or tenderness.      Left: Normal. No mass, nipple discharge, skin change or tenderness.   Abdominal:      General: There is no distension.      Palpations: Abdomen is soft. There is no mass.      Tenderness: There is no abdominal tenderness.   Genitourinary:     General: Normal vulva.      Labia:         Right: No rash or lesion.         Left: No rash or lesion.       Urethra: No prolapse, urethral swelling or urethral lesion.      Vagina: Normal.      Cervix: Normal.      Uterus: Normal.       Adnexa: Right adnexa normal and left adnexa normal.      Comments: Bladder: no masses or tenderness  Perineum/Anus: no masses, lesions, or skin changes  Musculoskeletal:         General: No swelling. Normal range of motion.      Cervical back: Normal range of motion.   Lymphadenopathy:      Upper Body:      Right upper body: No axillary adenopathy.      Left upper body: No axillary adenopathy.   Skin:     General: Skin is warm and dry.   Neurological:      General: No focal deficit present.      Mental Status: She is alert and oriented to person, place, and time.   Psychiatric:         Mood and " Affect: Mood normal.         Behavior: Behavior normal.         As part of wellness and prevention, the following topics were discussed with the patient:  Encouraged self breast exam  Physical activity and regular exercised encouraged.   Injury prevention discussed.  Healthy weight discussed.  Nutrition discussed.  Substance abuse/misuse discussed.  Sexual behavior/safe practices discussed.   Sexual transmitted disease prevention   Mental health discussed.           Assessment:  Diagnoses and all orders for this visit:    1. Well woman exam with routine gynecological exam (Primary)    - Breast and pelvic exam normal  - Pap up-to-date  - Declined STD screen  - Breast MRI guided biopsy scheduled 6/19/2024 and seeing Breast Surgery for management.  - Colonoscopy updated this year.  Patient will confirm that no lesions were identified but feels pretty sure of this.      Plan:  Return in about 1 year (around 6/5/2025) for Annual.      Shirin Amado MD  6/5/2024 08:35 EDT

## 2024-06-05 NOTE — TELEPHONE ENCOUNTER
Called [facility] and spoke to siena and gave them the correct diagnosis code for a breast MRI that was performed on 05/16/2024 and she did say they accept that (Z80.3)    Siena told me to allow 30 days for it to go through but it shouldn't be an issue     Claim Number [id number]    Spoke to shanta and let her know   Pt stated understanding

## 2024-06-06 ENCOUNTER — PATIENT MESSAGE (OUTPATIENT)
Dept: OBSTETRICS AND GYNECOLOGY | Age: 49
End: 2024-06-06
Payer: COMMERCIAL

## 2024-06-06 NOTE — TELEPHONE ENCOUNTER
From: Cari Mathis  To: Shirin Amado  Sent: 6/6/2024 8:16 AM EDT  Subject: Colonoscopy     The doctor I saw on March 5th for my colonoscopy was Dr. Masoud Booker. In the notes it said everything looked good and to come back in 10 years. Thank you for keeping track of this for me!

## 2024-06-12 NOTE — PROGRESS NOTES
06/19/24 0001   Pre-Procedure Phone Call   Procedure Time Verified Yes   Arrival Time 0645   Procedure Location Verified Yes   Medical History Reviewed Yes   NPO Status Reinforced No   Ride and Caregiver Arranged Yes   Patient Knows to Bring Current Medications No   Bring Outside Films Requested No

## 2024-06-17 ENCOUNTER — TELEPHONE (OUTPATIENT)
Dept: SURGERY | Facility: CLINIC | Age: 49
End: 2024-06-17
Payer: COMMERCIAL

## 2024-06-17 NOTE — TELEPHONE ENCOUNTER
----- Message from Orion FREEDMAN sent at 5/24/2024 12:35 PM EDT -----  Regarding: LEFT BREAST US  Patient called with concerns of her US report done on 5/17/24 by Dr. Reyes It has a typo.  It states that she is a 85 year old female and she is clearly 49 yr.    She just wants to make sure this is not another persons report.    Thanks :)

## 2024-06-17 NOTE — TELEPHONE ENCOUNTER
I followed up with Radiology having this addend.  They confirmed that this was a typo but since Dr. Reyes is no longer here they will not change the age on the report.  Dr. Colorado said she will discuss this with the patient at her upcoming biopsy appointment.  Pt was notified     CMA

## 2024-06-19 ENCOUNTER — HOSPITAL ENCOUNTER (OUTPATIENT)
Dept: MRI IMAGING | Facility: HOSPITAL | Age: 49
Discharge: HOME | End: 2024-06-19
Payer: COMMERCIAL

## 2024-06-19 ENCOUNTER — HOSPITAL ENCOUNTER (OUTPATIENT)
Dept: MAMMOGRAPHY | Facility: HOSPITAL | Age: 49
Discharge: HOME | End: 2024-06-19
Payer: COMMERCIAL

## 2024-06-19 VITALS
OXYGEN SATURATION: 99 % | HEART RATE: 73 BPM | TEMPERATURE: 97.8 F | DIASTOLIC BLOOD PRESSURE: 76 MMHG | RESPIRATION RATE: 16 BRPM | SYSTOLIC BLOOD PRESSURE: 119 MMHG | WEIGHT: 110 LBS | BODY MASS INDEX: 19.49 KG/M2 | HEIGHT: 63 IN

## 2024-06-19 DIAGNOSIS — R92.8 ABNORMAL FINDING ON BREAST IMAGING: ICD-10-CM

## 2024-06-19 LAB — CREAT BLDA-MCNC: 0.8 MG/DL (ref 0.6–1.3)

## 2024-06-19 PROCEDURE — C1894 INTRO/SHEATH, NON-LASER: HCPCS

## 2024-06-19 PROCEDURE — A4648 IMPLANTABLE TISSUE MARKER: HCPCS

## 2024-06-19 PROCEDURE — 0 GADOBENATE DIMEGLUMINE 529 MG/ML SOLUTION: Performed by: NURSE PRACTITIONER

## 2024-06-19 PROCEDURE — A9577 INJ MULTIHANCE: HCPCS | Performed by: NURSE PRACTITIONER

## 2024-06-19 PROCEDURE — 25010000002 LIDOCAINE 1 % SOLUTION: Performed by: NURSE PRACTITIONER

## 2024-06-19 PROCEDURE — 77065 DX MAMMO INCL CAD UNI: CPT

## 2024-06-19 PROCEDURE — 82565 ASSAY OF CREATININE: CPT

## 2024-06-19 PROCEDURE — 88305 TISSUE EXAM BY PATHOLOGIST: CPT | Performed by: NURSE PRACTITIONER

## 2024-06-19 RX ORDER — LIDOCAINE HYDROCHLORIDE 10 MG/ML
1 INJECTION, SOLUTION INFILTRATION; PERINEURAL ONCE
Status: COMPLETED | OUTPATIENT
Start: 2024-06-19 | End: 2024-06-19

## 2024-06-19 RX ORDER — DIAZEPAM 5 MG/1
5 TABLET ORAL ONCE AS NEEDED
Status: COMPLETED | OUTPATIENT
Start: 2024-06-19 | End: 2024-06-19

## 2024-06-19 RX ORDER — MULTIPLE VITAMINS W/ MINERALS TAB 9MG-400MCG
1 TAB ORAL DAILY
COMMUNITY

## 2024-06-19 RX ADMIN — DIAZEPAM 5 MG: 5 TABLET ORAL at 07:59

## 2024-06-19 RX ADMIN — LIDOCAINE HYDROCHLORIDE 1 ML: 10 INJECTION, SOLUTION INFILTRATION; PERINEURAL at 08:47

## 2024-06-19 RX ADMIN — LIDOCAINE HYDROCHLORIDE 14 ML: 10; .005 INJECTION, SOLUTION EPIDURAL; INFILTRATION; INTRACAUDAL; PERINEURAL at 08:47

## 2024-06-19 RX ADMIN — GADOBENATE DIMEGLUMINE 9 ML: 529 INJECTION, SOLUTION INTRAVENOUS at 08:32

## 2024-06-19 NOTE — NURSING NOTE
Patient returned to this RN after MRI guided right breast biopsy. Gloria mammo tech, held 10 minutes of pressure afterward. However, after post mammogram imaging, small hematoma formed at biopsy site and area began to bleed around glue when pressure was applied. This RN held approximately 8 minutes of pressure, no further bleeding, & glue was removed. Hematoma resolved with pressure being held. Site cleansed and Exofin glue applied. Biopsy site to right outer breast lateral to the lower aspect of the areola clear with Exofin dry and intact. No firmness or swelling noted at or around biopsy site. However, patient wrapped with ace wrap per Dr. Colorado order. Denies pain. Ice pack with protective covering applied to biopsy site. Discharge instructions discussed with understanding voiced by patient. Copies provided to patient. No distress noted. This RN wheeled patient to Patient Discharge to meet spouse with car.

## 2024-06-20 ENCOUNTER — TELEPHONE (OUTPATIENT)
Dept: MAMMOGRAPHY | Facility: HOSPITAL | Age: 49
End: 2024-06-20
Payer: COMMERCIAL

## 2024-06-20 LAB
LAB AP CASE REPORT: NORMAL
PATH REPORT.FINAL DX SPEC: NORMAL
PATH REPORT.GROSS SPEC: NORMAL

## 2024-06-20 NOTE — TELEPHONE ENCOUNTER
"Post call complete checking on patient after procedure yesterday. Patient states, \"I am feeling okay.\" Patient continues wearing ace wrap, questions regarding this answered. Patient following discharge instructions as directed. No further questions. Patient appreciated call and care provided yesterday.   "

## 2024-06-25 ENCOUNTER — TELEPHONE (OUTPATIENT)
Dept: SURGERY | Facility: CLINIC | Age: 49
End: 2024-06-25
Payer: COMMERCIAL

## 2024-06-25 NOTE — TELEPHONE ENCOUNTER
Called and spoke with pt regarding papilloma.  Dr. Solis would like to excise this.  Please get her on her schedule.  She is going out on a boat today and would like the call tomorrow.

## 2024-08-30 ENCOUNTER — OFFICE VISIT (OUTPATIENT)
Dept: SURGERY | Facility: CLINIC | Age: 49
End: 2024-08-30
Payer: COMMERCIAL

## 2024-08-30 ENCOUNTER — PREP FOR SURGERY (OUTPATIENT)
Dept: OTHER | Facility: HOSPITAL | Age: 49
End: 2024-08-30

## 2024-08-30 VITALS
OXYGEN SATURATION: 100 % | WEIGHT: 113 LBS | BODY MASS INDEX: 20.02 KG/M2 | DIASTOLIC BLOOD PRESSURE: 78 MMHG | HEIGHT: 63 IN | SYSTOLIC BLOOD PRESSURE: 118 MMHG | HEART RATE: 74 BPM

## 2024-08-30 DIAGNOSIS — R92.30 DENSE BREASTS: ICD-10-CM

## 2024-08-30 DIAGNOSIS — D24.1 PAPILLOMA OF RIGHT BREAST: Primary | ICD-10-CM

## 2024-08-30 DIAGNOSIS — Z91.89 INCREASED RISK OF BREAST CANCER: ICD-10-CM

## 2024-08-30 DIAGNOSIS — R92.8 ABNORMAL MRI, BREAST: Primary | ICD-10-CM

## 2024-08-30 DIAGNOSIS — Z80.3 FAMILY HISTORY OF BREAST CANCER: ICD-10-CM

## 2024-08-30 DIAGNOSIS — D24.1 PAPILLOMA OF RIGHT BREAST: ICD-10-CM

## 2024-08-30 RX ORDER — HYDROCODONE BITARTRATE AND ACETAMINOPHEN 5; 325 MG/1; MG/1
TABLET ORAL
Qty: 10 TABLET | Refills: 0 | Status: SHIPPED | OUTPATIENT
Start: 2024-08-30

## 2024-08-30 RX ORDER — DIAZEPAM 5 MG
10 TABLET ORAL ONCE
Status: CANCELLED | OUTPATIENT
Start: 2024-10-24 | End: 2024-08-30

## 2024-08-30 RX ORDER — SODIUM CHLORIDE, SODIUM LACTATE, POTASSIUM CHLORIDE, CALCIUM CHLORIDE 600; 310; 30; 20 MG/100ML; MG/100ML; MG/100ML; MG/100ML
100 INJECTION, SOLUTION INTRAVENOUS CONTINUOUS
Status: CANCELLED | OUTPATIENT
Start: 2024-10-24

## 2024-08-30 RX ORDER — ONDANSETRON 4 MG/1
4 TABLET, FILM COATED ORAL EVERY 8 HOURS PRN
Qty: 10 TABLET | Refills: 1 | Status: SHIPPED | OUTPATIENT
Start: 2024-08-30

## 2024-08-30 NOTE — PROGRESS NOTES
Chief Complaint: Cari Mathis is a 49 y.o. female who was seen in consultation at the request of SNOW Huber  for abnormal breast imaging and family history breast cancer    History of Present Illness:  Patient presents with management of breast cancer risk and family history breast cancer.   She noted no new masses, skin changes, nipple discharge, nipple changes prior to her most recent imaging.    Her most recent imaging includes the followin2017  [facility]  Mammo  Cari Tarrant  MAMMO SCREENING  Heterogeneously dense. I see no new or dominant masses, areas of architectural distortion or skin thickening. There is no evidence for axillary distortion or skin thickening. There is no evidence for axillary lymphadenopathy or nipple retraction. Scattered punctate monomorphic calcifications are seen throughout both breasts.  BIRADS Category 2, Benign.    2018  [facility]  Cari Mathis  Screening mammogram.  Extremely dense.  There are multiple stable milk of calcium and punctate calcifications with diffuse/scattered distribution seen in both breasts. This is a typically benign pattern/process. No suspicious masses, suspicious microcalcifications or new areas of architectural distortion are identified. There has been no significant change from the prior.  BIRADS Category 2, Benign.    She has not had a breast biopsy in the past.  She has her uterus and ovaries, is pre-perimenopausal, and takes nor hormones.  Her family history includes the following:   She has 2 daughters, 2 sisters, 1 MA, 1 PA. Her mother had breast cancer age 32, is  and did not have genetic testing. Her PGM had breast ca age 70s.      In the interim,  Cari Mathis  has done well.  She has noted no changes in her breast exam. No new masses, skin changes, nipple changes, nipple discharge either breast.     Her most recent imaging includes the following:  [facility] right diagnostic [facility] with 3D.  The breast is extremely dense.  Superior asymmetry does not persist.  On ultrasound no suspicious mass calcifications or other abnormalities.  BI-RADS 1.     MRI November 18, 2019 bilateral breast MRI [facility].  No findings suspicious in either breast BI-RADS 2.        In the interim,  Cari Mathis  has done well.  She has noted no changes in her breast exam. No new masses, skin changes, nipple changes, nipple discharge either breast.   She denies headache, bone pain, belly pain, cough, changes in vision or gait.    Her most recent imaging includes the following:  Bilateral breast MRI November 20, 2020: [facility] right breast no suspicious enhancing mass, left breast no suspicious enhancing mass.  BI-RADS 1  [facility] bilateral screening mammogram with tomosynthesis November 20, 2020: The breasts are heterogenous Loy dense.  BI-RADS 2    She is here to review.      Interval HIstory:  In the interim,  Cari Mathis  has done well.  She has noted no changes in her breast exam. No new masses, skin changes, nipple changes, nipple discharge either breast.   She denies headache, bone pain, belly pain, cough, changes in vision or gait.  Her most recent imaging includes the following:  MRI November 18, 2019 bilateral breast MRI [facility].  No findings suspicious in either breast BI-RADS 2.      Bilateral breast MRI November 20, 2020: [facility] right breast no suspicious enhancing mass, left breast no suspicious enhancing mass.  BI-RADS 1  [facility] bilateral screening mammogram with tomosynthesis November 20, 2020: The breasts are heterogenous Loy dense.  BI-RADS 2    11/08/2021, Bilateral Screening MMG w/[facility] ([facility])  Extremely dense  BI-RADS 2: Benign findings    11/29/2021, MRI Breast Bilateral ([facility])  No MRI evidence of malignancy in either breast.    11/09/2022, Bilateral Screening  MMG [facility] ([facility])  Extremely dense  BI-RADS 2: Benign findings    05/01/2023, MRI Breast Bilateral ([facility])  No MRI evidence of malignancy in either breast.  BI-RADS 2: Benign findings    11/16/2023, Bilateral Screening MMG w/[facility] ([facility])  BI-RADS 1: Negative    05/16/2024, MRI Breast Bilateral ([facility])  RIGHT BREAST:  At 7:00 in the middle to anterior right breast, 2 cm posterior to the nipple, there is a 0.8 x 0.7 x 1.1 cm enhancing possibly cystic and solid mass.  IMPRESSION AND RECOMMENDATION  Suspicious 1.1 cm mass at 7:00 in the right breast. Recommend further evaluation with targeted ultrasound, followed by possible ultrasound-guided core needle biopsy.  BI-RADS 4: Suspicious    She then had a MRI guided biopsy:    06/19/2024, MRI Breast Biopsy ([facility])  9-Gauge, 7 core samples. A stoplight clip was deployed. Stoplight clip was approximately 1.2 cm medial to the expected location of the biopsy site.  IMPRESSION/RECOMMENDATIONS:  Pathology is concordant. Recommend surgical management.    SURGICAL PATHOLOGY REPORT  1.  Breast, right, 7:00, MRI biopsy (stoplight clip): Benign breast tissue with               -A.  Nonintact intraductal papilloma measuring up to 3.3 mm on the slide               -B.  Calcium oxalate crystals identified               -C.  Clustered cysts with microcalcifications in the surrounding cyst wall with inflammation               -D.  Duct ectasia               -E.  Sclerosing adenosis               -F.  Fibroadenomatoid hyperplasia               -G. Moderate ductal hyperplasia of the usual type    She is here to review.  Review of Systems:  Review of Systems   Constitutional:  Positive for unexpected weight change (4 lb wt gain since last visit).   All other systems reviewed and are negative.       Past Medical and Surgical History:  Breast Biopsy History:  Patient has not had a breast biopsy in the past.  Breast Cancer HIstory:  Patient does not have a past medical history of breast cancer.  Breast  Operations, and year:  NONE   Obstetric/Gynecologic History:  Age menstrual periods began: 16  Patient is postmenopausal at age 48.  Number of pregnancies: 1  Number of live births: 2  Number of abortions or miscarriages: 0  Age of delivery of first child: 33  Patient breast fed, for the following lenth of time: 3 MONTHS   Length of time taking birth control pills: 5 YRS   Patient has never taken hormone replacement  PATIENT HAS BOTH OVARIES AND UTERUS.     Past Surgical History:   Procedure Laterality Date    ABDOMINOPLASTY      with mesh     SECTION       Past Medical History:   Diagnosis Date    Allergic     Anemia     Anxiety     Fibrocystic breast changes, bilateral 2021    Hypertension     Kidney stones     Migraines     Urinary tract infection        Prior Hospitalizations, other than for surgery or childbirth, and year:  KIDNEY STONES     Social History     Socioeconomic History    Marital status:    Tobacco Use    Smoking status: Never     Passive exposure: Never    Smokeless tobacco: Never   Vaping Use    Vaping status: Never Used   Substance and Sexual Activity    Alcohol use: No    Drug use: Never    Sexual activity: Yes     Partners: Male     Birth control/protection: None     Patient is .  Patient is employed full time with the following occupation:   Patient drinks 2 servings of caffeine per day.    Family History:  Family History   Problem Relation Age of Onset    Hypertension Father     Polymyalgia rheumatica Father     Ovarian cancer Mother     Breast cancer Mother 32    No Known Problems Brother     No Known Problems Sister     No Known Problems Son     No Known Problems Daughter     Alcohol abuse Paternal Grandfather     Stroke Paternal Grandfather     Melanoma Paternal Grandmother 70    Skin cancer Paternal Grandmother     No Known Problems Maternal Grandmother     No Known Problems Maternal Aunt     No Known Problems Paternal Aunt     Breast cancer  "Paternal Aunt         p great aunt  unknown age     BRCA 1/2 Neg Hx     Colon cancer Neg Hx     Endometrial cancer Neg Hx     Uterine cancer Neg Hx        Vital Signs:  /78 (BP Location: Left arm, Patient Position: Sitting, Cuff Size: Adult)   Pulse 74   Ht 160 cm (62.99\")   Wt 51.3 kg (113 lb)   LMP 01/01/2023   SpO2 100%   BMI 20.02 kg/m²      Medications:    Current Outpatient Medications:     metoprolol succinate XL (TOPROL-XL) 50 MG 24 hr tablet, Take 1 tablet by mouth Daily., Disp: , Rfl:     multivitamin with minerals (MULTIVITAMIN ADULT PO), Take 1 tablet by mouth Daily., Disp: , Rfl:     VITAMIN D, CHOLECALCIFEROL, PO, Take  by mouth., Disp: , Rfl:     fluticasone (FLONASE) 50 MCG/ACT nasal spray, 2 sprays into the nostril(s) as directed by provider Daily. (Patient not taking: Reported on 8/30/2024), Disp: , Rfl:     HYDROcodone-acetaminophen (NORCO) 5-325 MG per tablet, 1-2 tabs po q 4-6hr prn pain, wean to tylenol, Disp: 10 tablet, Rfl: 0    ondansetron (ZOFRAN) 4 MG tablet, Take 1 tablet by mouth Every 8 (Eight) Hours As Needed for Nausea or Vomiting. May take 8 mg po q8hr prn is 4mg is not effective, Disp: 10 tablet, Rfl: 1     Allergies:  Allergies   Allergen Reactions    Nitrofurantoin Hives    Ciprofloxacin-Hydrocortisone Hives    Dilaudid [Hydromorphone Hcl] Hives    Hibiclens [Chlorhexidine Gluconate] Rash       Physical Examination:  /78 (BP Location: Left arm, Patient Position: Sitting, Cuff Size: Adult)   Pulse 74   Ht 160 cm (62.99\")   Wt 51.3 kg (113 lb)   LMP 01/01/2023   SpO2 100%   BMI 20.02 kg/m²   General Appearance:  Patient is in no distress.  She is well kept and has an thin build.   Psychiatric:  Patient with appropriate mood and affect. Alert and oriented to self, time, and place.    Breast, RIGHT:  small sized, 34B, symmetric with the contralateral side.  Breast skin is without erythema, edema, rashes.  There are no visible abnormalities upon inspection " during the arm-raising maneuver or with hands on hips in the sitting position.  There is a slight indention at the RIGHT 7:00 periaerolar location form her recent biopsy (she states that she did not notice this prior) There is no other nipple retraction, discharge or nipple/areolar skin changes.There are no masses palpable in the sitting or supine positions.    Breast, LEFT:  small sized, 34B,  symmetric with the contralateral side.  Breast skin is without erythema, edema, rashes.  There are no visible abnormalities upon inspection during the arm-raising maneuver or with hands on hips in the sitting position. There is no nipple retraction, discharge or nipple/areolar skin changes.There are no masses palpable in the sitting or supine positions.    Lymphatic:  There is no axillary, cervical, infraclavicular, or supraclavicular adenopathy bilaterally.  Eyes:  Pupils are round and reactive to light.  Cardiovascular:  Heart rate and rhythm are regular.  Respiratory:  Lungs are clear bilaterally with no crackles or wheezes in any lung field.  Gastrointestinal:  Abdomen is soft, nondistended, and nontender.     Musculoskeletal:  Good strength in all 4 extremities.   There is good range of motion in both shoulders.    Skin:  No new skin lesions or rashes on the skin excluding the breast (see breast exam above).        Imagin2016  [facility]  No change. No evidence of malignancy.  BIRADS 2, Negative.    2017  [facility]  Mammo  [facility]  MAMMO SCREENING  Heterogeneously dense. I see no new or dominant masses, areas of architectural distortion or skin thickening. There is no evidence for axillary distortion or skin thickening. There is no evidence for axillary lymphadenopathy or nipple retraction. Scattered punctate monomorphic calcifications are seen throughout both breasts.  BIRADS Category 2, Benign.    2018  [facility]  Screening mammogram.  Extremely dense.  There are multiple stable milk of calcium and punctate calcifications with diffuse/scattered distribution seen in both breasts. This is a typically benign pattern/process. No suspicious masses, suspicious microcalcifications or new areas of architectural distortion are identified. There has been no significant change from the prior.  BIRADS Category 2, Benign.    [facility] December 2, 2019 right diagnostic mammogram with 3D.  The breast is extremely dense.  Superior asymmetry does not persist.  On ultrasound no suspicious mass calcifications or other abnormalities.  BI-RADS 1.     MRI November 18, 2019 bilateral breast MRI [facility].  No findings suspicious in either breast BI-RADS 2.    [facility] December 2, 2019 right diagnostic mammogram with 3D.  The breast is extremely dense.  Superior asymmetry does not persist.  On ultrasound no suspicious mass calcifications or other abnormalities.  BI-RADS 1.     MRI November 18, 2019 bilateral breast MRI [facility].  No findings suspicious in either breast BI-RADS 2.      Bilateral breast MRI November 20, 2020: [facility] right breast no suspicious enhancing mass, left breast no suspicious enhancing mass.  BI-RADS 1  [facility] bilateral screening mammogram with tomosynthesis November 20, 2020: The breasts are heterogenous Loy dense.  BI-RADS 2    11/08/2021, Bilateral Screening MMG w/[facility] ([facility])  Extremely dense  BI-RADS 2: Benign findings    11/29/2021, MRI Breast Bilateral ([facility])  No MRI evidence of malignancy in either breast.    11/09/2022, Bilateral Screening MMG w/[facility] ([facility])  Extremely dense  BI-RADS 2: Benign findings    05/01/2023, MRI Breast Bilateral ([facility])  No MRI evidence of malignancy in either breast.  BI-RADS 2: Benign findings    11/16/2023, Bilateral Screening MMG w/[facility] ([facility])  BI-RADS 1:  Negative    05/16/2024, MRI Breast Bilateral ([facility])  RIGHT BREAST:  At 7:00 in the middle to anterior right breast, 2 cm posterior to the nipple, there is a 0.8 x 0.7 x 1.1 cm enhancing possibly cystic and solid mass.  IMPRESSION AND RECOMMENDATION  Suspicious 1.1 cm mass at 7:00 in the right breast. Recommend further evaluation with targeted ultrasound, followed by possible ultrasound-guided core needle biopsy.  BI-RADS 4: Suspicious    Pathology:  06/19/2024, MRI Breast Biopsy ([facility])  9-Gauge, 7 core samples. A stoplight clip was deployed. Stoplight clip was approximately 1.2 cm medial to the expected location of the biopsy site.  IMPRESSION/RECOMMENDATIONS:  Pathology is concordant. Recommend surgical management.    SURGICAL PATHOLOGY REPORT  1.  Breast, right, 7:00, MRI biopsy (stoplight clip): Benign breast tissue with               -A.  Nonintact intraductal papilloma measuring up to 3.3 mm on the slide               -B.  Calcium oxalate crystals identified               -C.  Clustered cysts with microcalcifications in the surrounding cyst wall with inflammation               -D.  Duct ectasia               -E.  Sclerosing adenosis               -F.  Fibroadenomatoid hyperplasia               -G. Moderate ductal hyperplasia of the usual type    Labs:  09/30/2019  [employer] [employer]  Genetic   [place]  BRCA1 and BRCA2 Aynalysis  BRCA2 - c.4747A>T; UNCERTAIN CLINICAL SIGNIFICANCE      [employer] Hereditary Cancer Screen  05/17/2019  [facility]   [facility]  GENETIC RESULT: NEGATIVE - NO CLINICALLY SIGNIFICANT MUTATION IDENTIFIED  ADDITIONAL FINDINGS:  GENE    VARIANT(S) OF UNCERTAIN SIGNIFICANCE  BRCA2    c.4747A>T  MLH1    c. 896G>A          Procedures:      Assessment:   Diagnosis Plan   1. Abnormal MRI, breast  HYDROcodone-acetaminophen (NORCO) 5-325 MG per tablet      2. Papilloma of right breast  HYDROcodone-acetaminophen (NORCO) 5-325 MG per tablet      3. Family history of breast  cancer        4. Dense breasts        5. Increased risk of breast cancer               1-2  RIGHT breast 7:00, central- 1.1 cm on MRI, not seen on mamomgram or UD, no associated symptoms- Core biopsy  returned as a papilloma fragmented, 3.3mm in one fragment, FCC, FA hyperplasia, UDH  Stoplight marker migrated medially 1.2cm  - have emailed Dr [place] to localize papilloma and biopsy site rather than the clip    Target for excision    3-  Mother age 32, , did not have genetic testing  PGM age 70s    My Risk sent   -VUS in BRCA2 c.4747A>T  -VUS in MLH1 c.896G>A      4-  Extremely dense breast tissue on mammography        Plan:  Cari and her  Ian are neighbors in [facility].  She and I reviewed her most recent imaging, exam and pathology findings. We reviewed the nature of papillomas. We discussed the option of excision versus imaging FU for cases of papillomas where:  1- there is no atypia at the time of core  2- the biopsy is a large gauge core needle with multiple samples, supporting adequate sampling  3- there is no residual on imaging or lesion appears to have been completely removed on imaging      She knows that there can occasionally be a pathologic upgrade where the above criteria are not met.  We discussed that peripheral papillomas have a higher degree of association with atypia than central.    She has a central first papilloma. It is over 1 cm and seen on ly on MRI.    We discussed serial imaging and clinical FU versus excision and we prefer the latter.    We will plan for a RIGHT breast needle localized excision papilloma.    Discussed risks bleeding, infection, altered nipple sensation.    Previously, ee reviewed the option of talking with medical oncology about risk reducing medications and she is certain that she is not interested in this consultation.      Previously we discussed her risk assessment and she follows with Patrick in the high risk clinic.    Her next  mammogram is due 11-17-24 at [facility] and her next MRI due 5-17-25 at [facility].      Caitie Solis MD      Next Appointment:  Return for surgery.    Today I spent 45 minutes doing the following: Reviewing records, labs, outside imaging and reports in preparation for the patient visit; obtaining medical history; performing the physical exam; counseling and educating the patient and any available family or caregivers; ordering medications, tests or procedures; coordinating care with any other physicians on her care team as needed, and documenting all of the above in the medical record as well as sending communications with her other healthcare professionals.    EMR Dragon/transcription disclaimer:    Much of this encounter note is an electronic transcription/translocation of spoken language to printed text.  The electronic translation of spoken language may permit erroneous, or at times, nonsensical words or phrases to be inadvertently transcribed.  Although I have reviewed the note from such areas, some may still exist.

## 2024-08-30 NOTE — H&P
There are no changes in the history or physical exam, aside from any that are listed as an addendum at the bottom of this document.   Today, patient will go for the surgery listed in the plan below.     Chief Complaint: Cari Mathis is a 49 y.o. female who was seen in consultation at the request of SNOW Huber  for abnormal breast imaging and family history breast cancer    History of Present Illness:  Patient presents with management of breast cancer risk and family history breast cancer.   She noted no new masses, skin changes, nipple discharge, nipple changes prior to her most recent imaging.    Her most recent imaging includes the followin2017  [facility]  Mammo  [facility]  MAMMO SCREENING  Heterogeneously dense. I see no new or dominant masses, areas of architectural distortion or skin thickening. There is no evidence for axillary distortion or skin thickening. There is no evidence for axillary lymphadenopathy or nipple retraction. Scattered punctate monomorphic calcifications are seen throughout both breasts.  BIRADS Category 2, Benign.    2018  [facility]  [facility]  Screening mammogram.  Extremely dense.  There are multiple stable milk of calcium and punctate calcifications with diffuse/scattered distribution seen in both breasts. This is a typically benign pattern/process. No suspicious masses, suspicious microcalcifications or new areas of architectural distortion are identified. There has been no significant change from the prior.  BIRADS Category 2, Benign.    She has not had a breast biopsy in the past.  She has her uterus and ovaries, is pre-perimenopausal, and takes nor hormones.  Her family history includes the following:   She has 2 daughters, 2 sisters, 1 MA, 1 PA. Her mother had breast cancer age 32, is  and did not have genetic testing. Her PGM had breast ca age 70s.      In the interim,  Cari Mathis  has done well.  She  has noted no changes in her breast exam. No new masses, skin changes, nipple changes, nipple discharge either breast.     Her most recent imaging includes the following:  [facility] right diagnostic mamm[facility] with 3D.  The breast is extremely dense.  Superior asymmetry does not persist.  On ultrasound no suspicious mass calcifications or other abnormalities.  BI-RADS 1.     MRI November 18, 2019 bilateral breast MRI [facility].  No findings suspicious in either breast BI-RADS 2.        In the interim,  Cari Mathis  has done well.  She has noted no changes in her breast exam. No new masses, skin changes, nipple changes, nipple discharge either breast.   She denies headache, bone pain, belly pain, cough, changes in vision or gait.    Her most recent imaging includes the following:  Bilateral breast MRI November 20, 2020: [facility] right breast no suspicious enhancing mass, left breast no suspicious enhancing mass.  BI-RADS 1  [facility] bilateral screening mammogram with tomosynthesis November 20, 2020: The breasts are heterogenous Lyo dense.  BI-RADS 2    She is here to review.      Interval HIstory:  In the interim,  Cari Mathis  has done well.  She has noted no changes in her breast exam. No new masses, skin changes, nipple changes, nipple discharge either breast.   She denies headache, bone pain, belly pain, cough, changes in vision or gait.  Her most recent imaging includes the following:  MRI November 18, 2019 bilateral breast MRI [facility].  No findings suspicious in either breast BI-RADS 2.      Bilateral breast MRI November 20, 2020: [facility] right breast no suspicious enhancing mass, left breast no suspicious enhancing mass.  BI-RADS 1  [facility] bilateral screening mammogram with tomosynthesis November 20, 2020: The breasts are heterogenous Loy dense.  BI-RADS 2    11/08/2021,  Bilateral Screening MMG w/[facility] ([facility])  Extremely dense  BI-RADS 2: Benign findings    11/29/2021, MRI Breast Bilateral ([facility])  No MRI evidence of malignancy in either breast.    11/09/2022, Bilateral Screening MMG w/[facility] ([facility])  Extremely dense  BI-RADS 2: Benign findings    05/01/2023, MRI Breast Bilateral ([facility])  No MRI evidence of malignancy in either breast.  BI-RADS 2: Benign findings    11/16/2023, Bilateral Screening MMG w/[facility] ([facility])  BI-RADS 1: Negative    05/16/2024, MRI Breast Bilateral ([facility])  RIGHT BREAST:  At 7:00 in the middle to anterior right breast, 2 cm posterior to the nipple, there is a 0.8 x 0.7 x 1.1 cm enhancing possibly cystic and solid mass.  IMPRESSION AND RECOMMENDATION  Suspicious 1.1 cm mass at 7:00 in the right breast. Recommend further evaluation with targeted ultrasound, followed by possible ultrasound-guided core needle biopsy.  BI-RADS 4: Suspicious    She then had a MRI guided biopsy:    06/19/2024, MRI Breast Biopsy ([facility])  9-Gauge, 7 core samples. A stoplight clip was deployed. Stoplight clip was approximately 1.2 cm medial to the expected location of the biopsy site.  IMPRESSION/RECOMMENDATIONS:  Pathology is concordant. Recommend surgical management.    SURGICAL PATHOLOGY REPORT  1.  Breast, right, 7:00, MRI biopsy (stoplight clip): Benign breast tissue with               -A.  Nonintact intraductal papilloma measuring up to 3.3 mm on the slide               -B.  Calcium oxalate crystals identified               -C.  Clustered cysts with microcalcifications in the surrounding cyst wall with inflammation               -D.  Duct ectasia               -E.  Sclerosing adenosis               -F.  Fibroadenomatoid hyperplasia               -G. Moderate ductal hyperplasia of the usual type    She is here to review.  Review of Systems:  Review of Systems   Constitutional:  Positive for unexpected weight change (4 lb wt gain since last visit).   All other systems reviewed and are negative.        Past Medical and Surgical History:  Breast Biopsy History:  Patient has not had a breast biopsy in the past.  Breast Cancer HIstory:  Patient does not have a past medical history of breast cancer.  Breast Operations, and year:  NONE   Obstetric/Gynecologic History:  Age menstrual periods began: 16  Patient is postmenopausal at age 48.  Number of pregnancies: 1  Number of live births: 2  Number of abortions or miscarriages: 0  Age of delivery of first child: 33  Patient breast fed, for the following lenth of time: 3 MONTHS   Length of time taking birth control pills: 5 YRS   Patient has never taken hormone replacement  PATIENT HAS BOTH OVARIES AND UTERUS.     Past Surgical History:   Procedure Laterality Date    ABDOMINOPLASTY      with mesh     SECTION       Past Medical History:   Diagnosis Date    Allergic     Anemia     Anxiety     Fibrocystic breast changes, bilateral 2021    Hypertension     Kidney stones     Migraines     Urinary tract infection        Prior Hospitalizations, other than for surgery or childbirth, and year:  KIDNEY STONES     Social History     Socioeconomic History    Marital status:    Tobacco Use    Smoking status: Never     Passive exposure: Never    Smokeless tobacco: Never   Vaping Use    Vaping status: Never Used   Substance and Sexual Activity    Alcohol use: No    Drug use: Never    Sexual activity: Yes     Partners: Male     Birth control/protection: None     Patient is .  Patient is employed full time with the following occupation:   Patient drinks 2 servings of caffeine per day.    Family History:  Family History   Problem Relation Age of Onset    Hypertension Father     Polymyalgia rheumatica Father     Ovarian cancer Mother     Breast cancer Mother 32    No Known Problems Brother     No Known Problems Sister     No Known Problems Son     No Known Problems Daughter     Alcohol abuse Paternal Grandfather     Stroke Paternal  "Grandfather     Melanoma Paternal Grandmother 70    Skin cancer Paternal Grandmother     No Known Problems Maternal Grandmother     No Known Problems Maternal Aunt     No Known Problems Paternal Aunt     Breast cancer Paternal Aunt         p great aunt  unknown age     BRCA 1/2 Neg Hx     Colon cancer Neg Hx     Endometrial cancer Neg Hx     Uterine cancer Neg Hx        Vital Signs:  /78 (BP Location: Left arm, Patient Position: Sitting, Cuff Size: Adult)   Pulse 74   Ht 160 cm (62.99\")   Wt 51.3 kg (113 lb)   LMP 01/01/2023   SpO2 100%   BMI 20.02 kg/m²      Medications:    Current Outpatient Medications:     metoprolol succinate XL (TOPROL-XL) 50 MG 24 hr tablet, Take 1 tablet by mouth Daily., Disp: , Rfl:     multivitamin with minerals (MULTIVITAMIN ADULT PO), Take 1 tablet by mouth Daily., Disp: , Rfl:     VITAMIN D, CHOLECALCIFEROL, PO, Take  by mouth., Disp: , Rfl:     fluticasone (FLONASE) 50 MCG/ACT nasal spray, 2 sprays into the nostril(s) as directed by provider Daily. (Patient not taking: Reported on 8/30/2024), Disp: , Rfl:     HYDROcodone-acetaminophen (NORCO) 5-325 MG per tablet, 1-2 tabs po q 4-6hr prn pain, wean to tylenol, Disp: 10 tablet, Rfl: 0    ondansetron (ZOFRAN) 4 MG tablet, Take 1 tablet by mouth Every 8 (Eight) Hours As Needed for Nausea or Vomiting. May take 8 mg po q8hr prn is 4mg is not effective, Disp: 10 tablet, Rfl: 1     Allergies:  Allergies   Allergen Reactions    Nitrofurantoin Hives    Ciprofloxacin-Hydrocortisone Hives    Dilaudid [Hydromorphone Hcl] Hives    Hibiclens [Chlorhexidine Gluconate] Rash       Physical Examination:  /78 (BP Location: Left arm, Patient Position: Sitting, Cuff Size: Adult)   Pulse 74   Ht 160 cm (62.99\")   Wt 51.3 kg (113 lb)   LMP 01/01/2023   SpO2 100%   BMI 20.02 kg/m²   General Appearance:  Patient is in no distress.  She is well kept and has an thin build.   Psychiatric:  Patient with appropriate mood and affect. Alert and " oriented to self, time, and place.    Breast, RIGHT:  small sized, 34B, symmetric with the contralateral side.  Breast skin is without erythema, edema, rashes.  There are no visible abnormalities upon inspection during the arm-raising maneuver or with hands on hips in the sitting position.  There is a slight indention at the RIGHT 7:00 periaerolar location form her recent biopsy (she states that she did not notice this prior) There is no other nipple retraction, discharge or nipple/areolar skin changes.There are no masses palpable in the sitting or supine positions.    Breast, LEFT:  small sized, 34B,  symmetric with the contralateral side.  Breast skin is without erythema, edema, rashes.  There are no visible abnormalities upon inspection during the arm-raising maneuver or with hands on hips in the sitting position. There is no nipple retraction, discharge or nipple/areolar skin changes.There are no masses palpable in the sitting or supine positions.    Lymphatic:  There is no axillary, cervical, infraclavicular, or supraclavicular adenopathy bilaterally.  Eyes:  Pupils are round and reactive to light.  Cardiovascular:  Heart rate and rhythm are regular.  Respiratory:  Lungs are clear bilaterally with no crackles or wheezes in any lung field.  Gastrointestinal:  Abdomen is soft, nondistended, and nontender.     Musculoskeletal:  Good strength in all 4 extremities.   There is good range of motion in both shoulders.    Skin:  No new skin lesions or rashes on the skin excluding the breast (see breast exam above).        Imagin2016  [facility] [place]  Mammo  [facility]  Screening mammogram.  No change. No evidence of malignancy.  BIRADS 2, Negative.    2017  [facility] [place]  Mammo  [facility]  MAMMO SCREENING  Heterogeneously dense. I see no new or dominant masses, areas of architectural distortion or skin thickening. There is no evidence for axillary distortion or skin thickening.  There is no evidence for axillary lymphadenopathy or nipple retraction. Scattered punctate monomorphic calcifications are seen throughout both breasts.  BIRADS Category 2, Benign.    11/16/2018  [facility]  Screening mammogram.  Extremely dense.  There are multiple stable milk of calcium and punctate calcifications with diffuse/scattered distribution seen in both breasts. This is a typically benign pattern/process. No suspicious masses, suspicious microcalcifications or new areas of architectural distortion are identified. There has been no significant change from the prior.  BIRADS Category 2, Benign.    [facility] December 2, 2019 right diagnostic mammogram with 3D.  The breast is extremely dense.  Superior asymmetry does not persist.  On ultrasound no suspicious mass calcifications or other abnormalities.  BI-RADS 1.     MRI November 18, 2019 bilateral breast MRI [facility].  No findings suspicious in either breast BI-RADS 2.    [facility] December 2, 2019 right diagnostic mammogram with 3D.  The breast is extremely dense.  Superior asymmetry does not persist.  On ultrasound no suspicious mass calcifications or other abnormalities.  BI-RADS 1.     MRI November 18, 2019 bilateral breast MRI [facility].  No findings suspicious in either breast BI-RADS 2.      Bilateral breast MRI November 20, 2020: [facility] right breast no suspicious enhancing mass, left breast no suspicious enhancing mass.  BI-RADS 1  [facility] bilateral screening mammogram with tomosynthesis November 20, 2020: The breasts are heterogenous Loy dense.  BI-RADS 2    11/08/2021, Bilateral Screening MMG w/[facility] ([facility])  Extremely dense  BI-RADS 2: Benign findings    11/29/2021, MRI Breast Bilateral ([facility])  No MRI evidence of malignancy in either breast.    11/09/2022, Bilateral Screening MMG w/[facility] ([facility])  Extremely dense  BI-RADS  2: Benign findings    05/01/2023, MRI Breast Bilateral ([facility])  No MRI evidence of malignancy in either breast.  BI-RADS 2: Benign findings    11/16/2023, Bilateral Screening MMG w/[facility] ([facility])  BI-RADS 1: Negative    05/16/2024, MRI Breast Bilateral ([facility])  RIGHT BREAST:  At 7:00 in the middle to anterior right breast, 2 cm posterior to the nipple, there is a 0.8 x 0.7 x 1.1 cm enhancing possibly cystic and solid mass.  IMPRESSION AND RECOMMENDATION  Suspicious 1.1 cm mass at 7:00 in the right breast. Recommend further evaluation with targeted ultrasound, followed by possible ultrasound-guided core needle biopsy.  BI-RADS 4: Suspicious    Pathology:  06/19/2024, MRI Breast Biopsy ([facility])  9-Gauge, 7 core samples. A stoplight clip was deployed. Stoplight clip was approximately 1.2 cm medial to the expected location of the biopsy site.  IMPRESSION/RECOMMENDATIONS:  Pathology is concordant. Recommend surgical management.    SURGICAL PATHOLOGY REPORT  1.  Breast, right, 7:00, MRI biopsy (stoplight clip): Benign breast tissue with               -A.  Nonintact intraductal papilloma measuring up to 3.3 mm on the slide               -B.  Calcium oxalate crystals identified               -C.  Clustered cysts with microcalcifications in the surrounding cyst wall with inflammation               -D.  Duct ectasia               -E.  Sclerosing adenosis               -F.  Fibroadenomatoid hyperplasia               -G. Moderate ductal hyperplasia of the usual type    Labs:  09/30/2019  [employer]  [employer] [place]  BRCA1 and BRCA2 Aynalysis  BRCA2 - c.4747A>T; UNCERTAIN CLINICAL SIGNIFICANCE      [facility] Hereditary Cancer Screen  05/17/2019  [place]   [facility]  GENETIC RESULT: NEGATIVE - NO CLINICALLY SIGNIFICANT MUTATION IDENTIFIED  ADDITIONAL FINDINGS:  GENE    VARIANT(S) OF UNCERTAIN SIGNIFICANCE  BRCA2    c.4747A>T  MLH1    c. 896G>A          Procedures:      Assessment:   Diagnosis Plan    1. Abnormal MRI, breast  HYDROcodone-acetaminophen (NORCO) 5-325 MG per tablet      2. Papilloma of right breast  HYDROcodone-acetaminophen (NORCO) 5-325 MG per tablet      3. Family history of breast cancer        4. Dense breasts        5. Increased risk of breast cancer               1-2  RIGHT breast 7:00, central- 1.1 cm on MRI, not seen on mamomgram or UD, no associated symptoms- Core biopsy  returned as a papilloma fragmented, 3.3mm in one fragment, FCC, FA hyperplasia, UDH  Stoplight marker migrated medially 1.2cm  - have emailed Dr [place] to localize papilloma and biopsy site rather than the clip    Target for excision    3-  Mother age 32, , did not have genetic testing  PGM age 70s    My Risk sent   -VUS in BRCA2 c.4747A>T  -VUS in MLH1 c.896G>A      4-  Extremely dense breast tissue on mammography        Plan:  Cari and her  Ian are neighbors in [facility].  She and I reviewed her most recent imaging, exam and pathology findings. We reviewed the nature of papillomas. We discussed the option of excision versus imaging FU for cases of papillomas where:  1- there is no atypia at the time of core  2- the biopsy is a large gauge core needle with multiple samples, supporting adequate sampling  3- there is no residual on imaging or lesion appears to have been completely removed on imaging      She knows that there can occasionally be a pathologic upgrade where the above criteria are not met.  We discussed that peripheral papillomas have a higher degree of association with atypia than central.    She has a central first papilloma. It is over 1 cm and seen on ly on MRI.    We discussed serial imaging and clinical FU versus excision and we prefer the latter.    We will plan for a RIGHT breast needle localized excision papilloma.    Discussed risks bleeding, infection, altered nipple sensation.    Previously, ee reviewed the option of talking with medical oncology about risk  reducing medications and she is certain that she is not interested in this consultation.      Previously we discussed her risk assessment and she follows with Patrick in the high risk clinic.    Her next mammogram is due 11-17-24 at [facility] and her next MRI due 5-17-25 at [facility].      Caitie Solis MD      Next Appointment:  Return for surgery.    Today I spent 45 minutes doing the following: Reviewing records, labs, outside imaging and reports in preparation for the patient visit; obtaining medical history; performing the physical exam; counseling and educating the patient and any available family or caregivers; ordering medications, tests or procedures; coordinating care with any other physicians on her care team as needed, and documenting all of the above in the medical record as well as sending communications with her other healthcare professionals.    EMR Dragon/transcription disclaimer:    Much of this encounter note is an electronic transcription/translocation of spoken language to printed text.  The electronic translation of spoken language may permit erroneous, or at times, nonsensical words or phrases to be inadvertently transcribed.  Although I have reviewed the note from such areas, some may still exist.

## 2024-09-04 PROBLEM — D24.1 PAPILLOMA OF RIGHT BREAST: Status: ACTIVE | Noted: 2024-08-30

## 2024-10-16 ENCOUNTER — PRE-ADMISSION TESTING (OUTPATIENT)
Dept: PREADMISSION TESTING | Facility: HOSPITAL | Age: 49
End: 2024-10-16
Payer: COMMERCIAL

## 2024-10-16 VITALS
TEMPERATURE: 97.1 F | HEART RATE: 76 BPM | OXYGEN SATURATION: 100 % | SYSTOLIC BLOOD PRESSURE: 140 MMHG | DIASTOLIC BLOOD PRESSURE: 72 MMHG | HEIGHT: 62 IN | WEIGHT: 113.4 LBS | RESPIRATION RATE: 16 BRPM | BODY MASS INDEX: 20.87 KG/M2

## 2024-10-16 LAB
ANION GAP SERPL CALCULATED.3IONS-SCNC: 7.7 MMOL/L (ref 5–15)
BUN SERPL-MCNC: 17 MG/DL (ref 6–20)
BUN/CREAT SERPL: 23.3 (ref 7–25)
CALCIUM SPEC-SCNC: 8.9 MG/DL (ref 8.6–10.5)
CHLORIDE SERPL-SCNC: 105 MMOL/L (ref 98–107)
CO2 SERPL-SCNC: 23.3 MMOL/L (ref 22–29)
CREAT SERPL-MCNC: 0.73 MG/DL (ref 0.57–1)
DEPRECATED RDW RBC AUTO: 41.9 FL (ref 37–54)
EGFRCR SERPLBLD CKD-EPI 2021: 101 ML/MIN/1.73
ERYTHROCYTE [DISTWIDTH] IN BLOOD BY AUTOMATED COUNT: 12.3 % (ref 12.3–15.4)
GLUCOSE SERPL-MCNC: 122 MG/DL (ref 65–99)
HCT VFR BLD AUTO: 34.9 % (ref 34–46.6)
HGB BLD-MCNC: 11 G/DL (ref 12–15.9)
MCH RBC QN AUTO: 28.9 PG (ref 26.6–33)
MCHC RBC AUTO-ENTMCNC: 31.5 G/DL (ref 31.5–35.7)
MCV RBC AUTO: 91.8 FL (ref 79–97)
PLATELET # BLD AUTO: 173 10*3/MM3 (ref 140–450)
PMV BLD AUTO: 10 FL (ref 6–12)
POTASSIUM SERPL-SCNC: 4.1 MMOL/L (ref 3.5–5.2)
RBC # BLD AUTO: 3.8 10*6/MM3 (ref 3.77–5.28)
SODIUM SERPL-SCNC: 136 MMOL/L (ref 136–145)
WBC NRBC COR # BLD AUTO: 3.76 10*3/MM3 (ref 3.4–10.8)

## 2024-10-16 PROCEDURE — 36415 COLL VENOUS BLD VENIPUNCTURE: CPT

## 2024-10-16 PROCEDURE — 85027 COMPLETE CBC AUTOMATED: CPT

## 2024-10-16 PROCEDURE — 80048 BASIC METABOLIC PNL TOTAL CA: CPT

## 2024-10-16 NOTE — DISCHARGE INSTRUCTIONS
Take the following medications the morning of surgery: NONE    ARRIVE TO ENTRANCE C.      If you are on prescription narcotic pain medication to control your pain you may also take that medication the morning of surgery.      General Instructions:     Do not eat solid food after midnight the night before surgery.  Clear liquids day of surgery are allowed but must be stopped at least two hours before your hospital arrival time.       Allowed clear liquids      Water, sodas, and tea or coffee with no cream or milk added.       12 to 20 ounces of a clear liquid that contains carbohydrates is recommended.  If non-diabetic, have Gatorade or Powerade.  If diabetic, have G2 or Powerade Zero.     Do not have liquids red in color.  Do not consume chicken, beef, pork or vegetable broth or bouillon cubes of any variety as they are not considered clear liquids and are not allowed.      Infants may have breast milk up to four hours before surgery.  Infants drinking formula may drink formula up to six hours before surgery.   Patients who avoid smoking, chewing tobacco and alcohol for 4 weeks prior to surgery have a reduced risk of post-operative complications.  Quit smoking as many days before surgery as you can.  Do not smoke, use chewing tobacco or drink alcohol the day of surgery.   If applicable bring your C-PAP/ BI-PAP machine in with you to preop day of surgery.  Bring any papers given to you in the doctor’s office.  Wear clean comfortable clothes.  Do not wear contact lenses, false eyelashes or make-up.  Bring a case for your glasses.   Bring crutches or walker if applicable.  Remove all piercings.  Leave jewelry and any other valuables at home.  Hair extensions with metal clips must be removed prior to surgery.  The Pre-Admission Testing nurse will instruct you to bring medications if unable to obtain an accurate list in Pre-Admission Testing.        If you were given a blood bank ID arm band remember to bring it with you  the day of surgery.    Preventing a Surgical Site Infection:  For 2 to 3 days before surgery, avoid shaving with a razor because the razor can irritate skin and make it easier to develop an infection.    Any areas of open skin can increase the risk of a post-operative wound infection by allowing bacteria to enter and travel throughout the body.  Notify your surgeon if you have any skin wounds / rashes even if it is not near the expected surgical site.  The area will need assessed to determine if surgery should be delayed until it is healed.  The night prior to surgery shower using a fresh bar of anti-bacterial soap (such as Dial) and clean washcloth.  Sleep in a clean bed with clean clothing.  Do not allow pets to sleep with you.  Shower on the morning of surgery using a fresh bar of anti-bacterial soap (such as Dial) and clean washcloth.  Dry with a clean towel and dress in clean clothing.  Ask your surgeon if you will be receiving antibiotics prior to surgery.  Make sure you, your family, and all healthcare providers clean their hands with soap and water or an alcohol based hand  before caring for you or your wound.    Day of surgery:  Your arrival time is approximately two hours before your scheduled surgery time.  Please note if you have an early arrival time the surgery doors do not open before 5:00 AM.  Upon arrival, a Pre-op nurse and Anesthesiologist will review your health history, obtain vital signs, and answer questions you may have.  The only belongings needed at this time will be a list of your home medications and if applicable your C-PAP/BI-PAP machine.  A Pre-op nurse will start an IV and you may receive medication in preparation for surgery, including something to help you relax.     Please be aware that surgery does come with discomfort.  We want to make every effort to control your discomfort so please discuss any uncontrolled symptoms with your nurse.   Your doctor will most likely have  prescribed pain medications.      If you are going home after surgery you will receive individualized written care instructions before being discharged.  A responsible adult must drive you to and from the hospital on the day of your surgery and ideally stay with you through the night.   .  Discharge prescriptions can be filled by the hospital pharmacy during regular pharmacy hours.  If you are having surgery late in the day/evening your prescription may be e-prescribed to your pharmacy.  Please verify your pharmacy hours or chose a 24 hour pharmacy to avoid not having access to your prescription because your pharmacy has closed for the day.    If you are staying overnight following surgery, you will be transported to your hospital room following the recovery period.  [facility] has all private rooms.    If you have any questions please call Pre-Admission Testing at [phone].  Deductibles and co-payments are collected on the day of service. Please be prepared to pay the required co-pay, deductible or deposit on the day of service as defined by your plan.    Call your surgeon immediately if you experience any of the following symptoms:  Sore Throat  Shortness of Breath or difficulty breathing  Cough  Chills  Body soreness or muscle pain  Headache  Fever  New loss of taste or smell  Do not arrive for your surgery ill.  Your procedure will need to be rescheduled to another time.  You will need to call your physician before the day of surgery to avoid any unnecessary exposure to hospital staff as well as other patients.

## 2024-10-24 ENCOUNTER — HOSPITAL ENCOUNTER (OUTPATIENT)
Dept: MAMMOGRAPHY | Facility: HOSPITAL | Age: 49
Discharge: HOME | End: 2024-10-24
Payer: COMMERCIAL

## 2024-10-24 ENCOUNTER — HOSPITAL ENCOUNTER (OUTPATIENT)
Facility: HOSPITAL | Age: 49
Setting detail: HOSPITAL OUTPATIENT SURGERY
Discharge: HOME | End: 2024-10-24
Attending: SURGERY | Admitting: SURGERY
Payer: COMMERCIAL

## 2024-10-24 ENCOUNTER — APPOINTMENT (OUTPATIENT)
Dept: GENERAL RADIOLOGY | Facility: HOSPITAL | Age: 49
End: 2024-10-24
Payer: COMMERCIAL

## 2024-10-24 ENCOUNTER — ANESTHESIA EVENT (OUTPATIENT)
Dept: PERIOP | Facility: HOSPITAL | Age: 49
End: 2024-10-24
Payer: COMMERCIAL

## 2024-10-24 ENCOUNTER — ANCILLARY PROCEDURE (OUTPATIENT)
Dept: LAB | Facility: HOSPITAL | Age: 49
End: 2024-10-24
Payer: COMMERCIAL

## 2024-10-24 ENCOUNTER — TRANSCRIBE ORDERS (OUTPATIENT)
Dept: LAB | Facility: HOSPITAL | Age: 49
End: 2024-10-24
Payer: COMMERCIAL

## 2024-10-24 ENCOUNTER — ANESTHESIA (OUTPATIENT)
Dept: PERIOP | Facility: HOSPITAL | Age: 49
End: 2024-10-24
Payer: COMMERCIAL

## 2024-10-24 VITALS
DIASTOLIC BLOOD PRESSURE: 80 MMHG | RESPIRATION RATE: 16 BRPM | OXYGEN SATURATION: 100 % | SYSTOLIC BLOOD PRESSURE: 134 MMHG | TEMPERATURE: 97.6 F | HEART RATE: 65 BPM

## 2024-10-24 DIAGNOSIS — D24.1 PAPILLOMA OF RIGHT BREAST: ICD-10-CM

## 2024-10-24 DIAGNOSIS — D24.1 PAPILLOMA OF RIGHT BREAST: Primary | ICD-10-CM

## 2024-10-24 PROCEDURE — 19125 EXCISION BREAST LESION: CPT | Performed by: SURGERY

## 2024-10-24 PROCEDURE — 25010000002 GENTAMICIN PER 80 MG: Performed by: SURGERY

## 2024-10-24 PROCEDURE — 88342 IMHCHEM/IMCYTCHM 1ST ANTB: CPT | Performed by: SURGERY

## 2024-10-24 PROCEDURE — 88341 IMHCHEM/IMCYTCHM EA ADD ANTB: CPT | Performed by: SURGERY

## 2024-10-24 PROCEDURE — 25010000002 ONDANSETRON PER 1 MG: Performed by: NURSE ANESTHETIST, CERTIFIED REGISTERED

## 2024-10-24 PROCEDURE — C1819 TISSUE LOCALIZATION-EXCISION: HCPCS

## 2024-10-24 PROCEDURE — 25810000003 LACTATED RINGERS PER 1000 ML: Performed by: ANESTHESIOLOGY

## 2024-10-24 PROCEDURE — 25010000002 LIDOCAINE 2% SOLUTION: Performed by: NURSE ANESTHETIST, CERTIFIED REGISTERED

## 2024-10-24 PROCEDURE — 25010000002 LIDOCAINE 1 % SOLUTION: Performed by: SURGERY

## 2024-10-24 PROCEDURE — S0260 H&P FOR SURGERY: HCPCS | Performed by: SURGERY

## 2024-10-24 PROCEDURE — 88307 TISSUE EXAM BY PATHOLOGIST: CPT | Performed by: SURGERY

## 2024-10-24 PROCEDURE — 25010000002 BUPIVACAINE (PF) 0.25 % SOLUTION 10 ML VIAL: Performed by: SURGERY

## 2024-10-24 PROCEDURE — 25010000002 LIDOCAINE 1 % SOLUTION 20 ML VIAL: Performed by: SURGERY

## 2024-10-24 PROCEDURE — 76098 X-RAY EXAM SURGICAL SPECIMEN: CPT

## 2024-10-24 PROCEDURE — 25010000002 PROPOFOL 10 MG/ML EMULSION: Performed by: NURSE ANESTHETIST, CERTIFIED REGISTERED

## 2024-10-24 PROCEDURE — 25010000002 CEFAZOLIN PER 500 MG: Performed by: SURGERY

## 2024-10-24 RX ORDER — SODIUM CHLORIDE, SODIUM LACTATE, POTASSIUM CHLORIDE, CALCIUM CHLORIDE 600; 310; 30; 20 MG/100ML; MG/100ML; MG/100ML; MG/100ML
100 INJECTION, SOLUTION INTRAVENOUS CONTINUOUS
Status: DISCONTINUED | OUTPATIENT
Start: 2024-10-24 | End: 2024-10-24 | Stop reason: HOSPADM

## 2024-10-24 RX ORDER — IPRATROPIUM BROMIDE AND ALBUTEROL SULFATE 2.5; .5 MG/3ML; MG/3ML
3 SOLUTION RESPIRATORY (INHALATION) ONCE AS NEEDED
Status: DISCONTINUED | OUTPATIENT
Start: 2024-10-24 | End: 2024-10-24 | Stop reason: HOSPADM

## 2024-10-24 RX ORDER — DIPHENHYDRAMINE HYDROCHLORIDE 50 MG/ML
12.5 INJECTION INTRAMUSCULAR; INTRAVENOUS
Status: DISCONTINUED | OUTPATIENT
Start: 2024-10-24 | End: 2024-10-24 | Stop reason: HOSPADM

## 2024-10-24 RX ORDER — NALOXONE HCL 0.4 MG/ML
0.2 VIAL (ML) INJECTION AS NEEDED
Status: DISCONTINUED | OUTPATIENT
Start: 2024-10-24 | End: 2024-10-24 | Stop reason: HOSPADM

## 2024-10-24 RX ORDER — FAMOTIDINE 10 MG/ML
20 INJECTION, SOLUTION INTRAVENOUS ONCE
Status: COMPLETED | OUTPATIENT
Start: 2024-10-24 | End: 2024-10-24

## 2024-10-24 RX ORDER — FENTANYL CITRATE 50 UG/ML
50 INJECTION, SOLUTION INTRAMUSCULAR; INTRAVENOUS ONCE AS NEEDED
Status: DISCONTINUED | OUTPATIENT
Start: 2024-10-24 | End: 2024-10-24 | Stop reason: HOSPADM

## 2024-10-24 RX ORDER — DIAZEPAM 5 MG
10 TABLET ORAL ONCE
Status: COMPLETED | OUTPATIENT
Start: 2024-10-24 | End: 2024-10-24

## 2024-10-24 RX ORDER — HYDRALAZINE HYDROCHLORIDE 20 MG/ML
5 INJECTION INTRAMUSCULAR; INTRAVENOUS
Status: DISCONTINUED | OUTPATIENT
Start: 2024-10-24 | End: 2024-10-24 | Stop reason: HOSPADM

## 2024-10-24 RX ORDER — PROPOFOL 10 MG/ML
VIAL (ML) INTRAVENOUS AS NEEDED
Status: DISCONTINUED | OUTPATIENT
Start: 2024-10-24 | End: 2024-10-24 | Stop reason: SURG

## 2024-10-24 RX ORDER — EPHEDRINE SULFATE 50 MG/ML
INJECTION, SOLUTION INTRAVENOUS AS NEEDED
Status: DISCONTINUED | OUTPATIENT
Start: 2024-10-24 | End: 2024-10-24 | Stop reason: SURG

## 2024-10-24 RX ORDER — LIDOCAINE HYDROCHLORIDE 10 MG/ML
0.5 INJECTION, SOLUTION INFILTRATION; PERINEURAL ONCE AS NEEDED
Status: DISCONTINUED | OUTPATIENT
Start: 2024-10-24 | End: 2024-10-24 | Stop reason: HOSPADM

## 2024-10-24 RX ORDER — OXYCODONE AND ACETAMINOPHEN 7.5; 325 MG/1; MG/1
1 TABLET ORAL EVERY 4 HOURS PRN
Status: DISCONTINUED | OUTPATIENT
Start: 2024-10-24 | End: 2024-10-24 | Stop reason: HOSPADM

## 2024-10-24 RX ORDER — SODIUM CHLORIDE 0.9 % (FLUSH) 0.9 %
3-10 SYRINGE (ML) INJECTION AS NEEDED
Status: DISCONTINUED | OUTPATIENT
Start: 2024-10-24 | End: 2024-10-24 | Stop reason: HOSPADM

## 2024-10-24 RX ORDER — ONDANSETRON 2 MG/ML
4 INJECTION INTRAMUSCULAR; INTRAVENOUS ONCE AS NEEDED
Status: COMPLETED | OUTPATIENT
Start: 2024-10-24 | End: 2024-10-24

## 2024-10-24 RX ORDER — EPHEDRINE SULFATE 50 MG/ML
5 INJECTION, SOLUTION INTRAVENOUS ONCE AS NEEDED
Status: DISCONTINUED | OUTPATIENT
Start: 2024-10-24 | End: 2024-10-24 | Stop reason: HOSPADM

## 2024-10-24 RX ORDER — LABETALOL HYDROCHLORIDE 5 MG/ML
5 INJECTION, SOLUTION INTRAVENOUS
Status: DISCONTINUED | OUTPATIENT
Start: 2024-10-24 | End: 2024-10-24 | Stop reason: HOSPADM

## 2024-10-24 RX ORDER — PROMETHAZINE HYDROCHLORIDE 25 MG/1
25 TABLET ORAL ONCE AS NEEDED
Status: DISCONTINUED | OUTPATIENT
Start: 2024-10-24 | End: 2024-10-24 | Stop reason: HOSPADM

## 2024-10-24 RX ORDER — DROPERIDOL 2.5 MG/ML
0.62 INJECTION, SOLUTION INTRAMUSCULAR; INTRAVENOUS
Status: DISCONTINUED | OUTPATIENT
Start: 2024-10-24 | End: 2024-10-24 | Stop reason: HOSPADM

## 2024-10-24 RX ORDER — LIDOCAINE HYDROCHLORIDE 10 MG/ML
10 INJECTION, SOLUTION INFILTRATION; PERINEURAL ONCE
Status: DISCONTINUED | OUTPATIENT
Start: 2024-10-24 | End: 2024-10-24

## 2024-10-24 RX ORDER — HYDROCODONE BITARTRATE AND ACETAMINOPHEN 5; 325 MG/1; MG/1
1 TABLET ORAL ONCE AS NEEDED
Status: COMPLETED | OUTPATIENT
Start: 2024-10-24 | End: 2024-10-24

## 2024-10-24 RX ORDER — FENTANYL CITRATE 50 UG/ML
50 INJECTION, SOLUTION INTRAMUSCULAR; INTRAVENOUS
Status: DISCONTINUED | OUTPATIENT
Start: 2024-10-24 | End: 2024-10-24 | Stop reason: HOSPADM

## 2024-10-24 RX ORDER — SODIUM CHLORIDE 0.9 % (FLUSH) 0.9 %
3 SYRINGE (ML) INJECTION EVERY 12 HOURS SCHEDULED
Status: DISCONTINUED | OUTPATIENT
Start: 2024-10-24 | End: 2024-10-24 | Stop reason: HOSPADM

## 2024-10-24 RX ORDER — MIDAZOLAM HYDROCHLORIDE 1 MG/ML
1 INJECTION INTRAMUSCULAR; INTRAVENOUS
Status: DISCONTINUED | OUTPATIENT
Start: 2024-10-24 | End: 2024-10-24 | Stop reason: HOSPADM

## 2024-10-24 RX ORDER — LIDOCAINE HYDROCHLORIDE 10 MG/ML
10 INJECTION, SOLUTION INFILTRATION; PERINEURAL ONCE
Status: COMPLETED | OUTPATIENT
Start: 2024-10-24 | End: 2024-10-24

## 2024-10-24 RX ORDER — ONDANSETRON 2 MG/ML
INJECTION INTRAMUSCULAR; INTRAVENOUS AS NEEDED
Status: DISCONTINUED | OUTPATIENT
Start: 2024-10-24 | End: 2024-10-24 | Stop reason: SURG

## 2024-10-24 RX ORDER — LIDOCAINE HYDROCHLORIDE 20 MG/ML
INJECTION, SOLUTION INFILTRATION; PERINEURAL AS NEEDED
Status: DISCONTINUED | OUTPATIENT
Start: 2024-10-24 | End: 2024-10-24 | Stop reason: SURG

## 2024-10-24 RX ORDER — SODIUM CHLORIDE, SODIUM LACTATE, POTASSIUM CHLORIDE, CALCIUM CHLORIDE 600; 310; 30; 20 MG/100ML; MG/100ML; MG/100ML; MG/100ML
9 INJECTION, SOLUTION INTRAVENOUS CONTINUOUS
Status: DISCONTINUED | OUTPATIENT
Start: 2024-10-24 | End: 2024-10-24 | Stop reason: HOSPADM

## 2024-10-24 RX ORDER — PROMETHAZINE HYDROCHLORIDE 25 MG/1
25 SUPPOSITORY RECTAL ONCE AS NEEDED
Status: DISCONTINUED | OUTPATIENT
Start: 2024-10-24 | End: 2024-10-24 | Stop reason: HOSPADM

## 2024-10-24 RX ORDER — FLUMAZENIL 0.1 MG/ML
0.2 INJECTION INTRAVENOUS AS NEEDED
Status: DISCONTINUED | OUTPATIENT
Start: 2024-10-24 | End: 2024-10-24 | Stop reason: HOSPADM

## 2024-10-24 RX ADMIN — FAMOTIDINE 20 MG: 10 INJECTION INTRAVENOUS at 08:21

## 2024-10-24 RX ADMIN — DIAZEPAM 10 MG: 5 TABLET ORAL at 06:22

## 2024-10-24 RX ADMIN — ONDANSETRON 4 MG: 2 INJECTION, SOLUTION INTRAMUSCULAR; INTRAVENOUS at 10:59

## 2024-10-24 RX ADMIN — PROPOFOL 180 MG: 10 INJECTION, EMULSION INTRAVENOUS at 08:37

## 2024-10-24 RX ADMIN — LIDOCAINE HYDROCHLORIDE 60 MG: 20 INJECTION, SOLUTION INFILTRATION; PERINEURAL at 08:37

## 2024-10-24 RX ADMIN — EPHEDRINE SULFATE 10 MG: 50 INJECTION INTRAVENOUS at 09:08

## 2024-10-24 RX ADMIN — LIDOCAINE HYDROCHLORIDE 3 ML: 10 INJECTION, SOLUTION INFILTRATION; PERINEURAL at 07:40

## 2024-10-24 RX ADMIN — CEFAZOLIN 2000 MG: 2 INJECTION, POWDER, FOR SOLUTION INTRAMUSCULAR; INTRAVENOUS at 08:22

## 2024-10-24 RX ADMIN — SODIUM CHLORIDE, POTASSIUM CHLORIDE, SODIUM LACTATE AND CALCIUM CHLORIDE 9 ML/HR: 600; 310; 30; 20 INJECTION, SOLUTION INTRAVENOUS at 08:21

## 2024-10-24 RX ADMIN — HYDROCODONE BITARTRATE AND ACETAMINOPHEN 1 TABLET: 5; 325 TABLET ORAL at 11:07

## 2024-10-24 RX ADMIN — ONDANSETRON 4 MG: 2 INJECTION INTRAMUSCULAR; INTRAVENOUS at 08:44

## 2024-10-24 NOTE — H&P
There are no changes in the history or physical exam, aside from any that are listed as an addendum at the bottom of this document.   Today, patient will go for the surgery listed in the plan below.   Had needle localization this morning w Dr Shepard    Chief Complaint: Cari Mathis is a 49 y.o. female who was seen in consultation at the request of SNOW Huber  for abnormal breast imaging and family history breast cancer    History of Present Illness:  Patient presents with management of breast cancer risk and family history breast cancer.   She noted no new masses, skin changes, nipple discharge, nipple changes prior to her most recent imaging.    Her most recent imaging includes the followin2017  [facility]  Mammo  [facility]  MAMMO SCREENING  Heterogeneously dense. I see no new or dominant masses, areas of architectural distortion or skin thickening. There is no evidence for axillary distortion or skin thickening. There is no evidence for axillary lymphadenopathy or nipple retraction. Scattered punctate monomorphic calcifications are seen throughout both breasts.  BIRADS Category 2, Benign.    2018  [facility]  Mammo  Cari Mathis  Screening mammogram.  Extremely dense.  There are multiple stable milk of calcium and punctate calcifications with diffuse/scattered distribution seen in both breasts. This is a typically benign pattern/process. No suspicious masses, suspicious microcalcifications or new areas of architectural distortion are identified. There has been no significant change from the prior.  BIRADS Category 2, Benign.    She has not had a breast biopsy in the past.  She has her uterus and ovaries, is pre-perimenopausal, and takes nor hormones.  Her family history includes the following:   She has 2 daughters, 2 sisters, 1 MA, 1 PA. Her mother had breast cancer age 32, is  and did not have genetic testing. Her PGM had breast ca age  70s.      In the interim,  Cari Mathis  has done well.  She has noted no changes in her breast exam. No new masses, skin changes, nipple changes, nipple discharge either breast.     Her most recent imaging includes the following:  [facility] right diagnostic mamm[facility] with 3D.  The breast is extremely dense.  Superior asymmetry does not persist.  On ultrasound no suspicious mass calcifications or other abnormalities.  BI-RADS 1.     MRI November 18, 2019 bilateral breast MRI [facility].  No findings suspicious in either breast BI-RADS 2.        In the interim,  Cari Mathis  has done well.  She has noted no changes in her breast exam. No new masses, skin changes, nipple changes, nipple discharge either breast.   She denies headache, bone pain, belly pain, cough, changes in vision or gait.    Her most recent imaging includes the following:  Bilateral breast MRI November 20, 2020: [facility] right breast no suspicious enhancing mass, left breast no suspicious enhancing mass.  BI-RADS 1  [facility] bilateral screening mammogram with tomosynthesis November 20, 2020: The breasts are heterogenous Loy dense.  BI-RADS 2    She is here to review.      Interval HIstory:  In the interim,  Cari Mathis  has done well.  She has noted no changes in her breast exam. No new masses, skin changes, nipple changes, nipple discharge either breast.   She denies headache, bone pain, belly pain, cough, changes in vision or gait.  Her most recent imaging includes the following:  MRI November 18, 2019 bilateral breast MRI [facility].  No findings suspicious in either breast BI-RADS 2.      Bilateral breast MRI November 20, 2020: [facility] right breast no suspicious enhancing mass, left breast no suspicious enhancing mass.  BI-RADS 1  [facility] bilateral screening mammogram with tomosynthesis November 20, 2020: The  breasts are heterogenous Loy dense.  BI-RADS 2    11/08/2021, Bilateral Screening MMG w/[facility] ([facility])  Extremely dense  BI-RADS 2: Benign findings    11/29/2021, MRI Breast Bilateral ([facility])  No MRI evidence of malignancy in either breast.    11/09/2022, Bilateral Screening MMG w/[facility] ([facility])  Extremely dense  BI-RADS 2: Benign findings    05/01/2023, MRI Breast Bilateral ([facility])  No MRI evidence of malignancy in either breast.  BI-RADS 2: Benign findings    11/16/2023, Bilateral Screening MMG w/[facility] ([facility])  BI-RADS 1: Negative    05/16/2024, MRI Breast Bilateral ([facility])  RIGHT BREAST:  At 7:00 in the middle to anterior right breast, 2 cm posterior to the nipple, there is a 0.8 x 0.7 x 1.1 cm enhancing possibly cystic and solid mass.  IMPRESSION AND RECOMMENDATION  Suspicious 1.1 cm mass at 7:00 in the right breast. Recommend further evaluation with targeted ultrasound, followed by possible ultrasound-guided core needle biopsy.  BI-RADS 4: Suspicious    She then had a MRI guided biopsy:    06/19/2024, MRI Breast Biopsy ([facility])  9-Gauge, 7 core samples. A stoplight clip was deployed. Stoplight clip was approximately 1.2 cm medial to the expected location of the biopsy site.  IMPRESSION/RECOMMENDATIONS:  Pathology is concordant. Recommend surgical management.    SURGICAL PATHOLOGY REPORT  1.  Breast, right, 7:00, MRI biopsy (stoplight clip): Benign breast tissue with               -A.  Nonintact intraductal papilloma measuring up to 3.3 mm on the slide               -B.  Calcium oxalate crystals identified               -C.  Clustered cysts with microcalcifications in the surrounding cyst wall with inflammation               -D.  Duct ectasia               -E.  Sclerosing adenosis               -F.  Fibroadenomatoid hyperplasia               -G. Moderate ductal hyperplasia of the usual type    She is here to review.  Review of Systems:  Review of Systems   Constitutional:  Positive for unexpected weight change (4 lb wt gain since  last visit).   All other systems reviewed and are negative.       Past Medical and Surgical History:  Breast Biopsy History:  Patient has not had a breast biopsy in the past.  Breast Cancer HIstory:  Patient does not have a past medical history of breast cancer.  Breast Operations, and year:  NONE   Obstetric/Gynecologic History:  Age menstrual periods began: 16  Patient is postmenopausal at age 48.  Number of pregnancies: 1  Number of live births: 2  Number of abortions or miscarriages: 0  Age of delivery of first child: 33  Patient breast fed, for the following lenth of time: 3 MONTHS   Length of time taking birth control pills: 5 YRS   Patient has never taken hormone replacement  PATIENT HAS BOTH OVARIES AND UTERUS.     Past Surgical History:   Procedure Laterality Date    ABDOMINOPLASTY      with mesh     SECTION       Past Medical History:   Diagnosis Date    Allergic     Anemia     Anxiety     Fibrocystic breast changes, bilateral 2021    Hypertension     Kidney stones     Migraines     Urinary tract infection        Prior Hospitalizations, other than for surgery or childbirth, and year:  KIDNEY STONES     Social History     Socioeconomic History    Marital status:    Tobacco Use    Smoking status: Never     Passive exposure: Never    Smokeless tobacco: Never   Vaping Use    Vaping status: Never Used   Substance and Sexual Activity    Alcohol use: No    Drug use: Never    Sexual activity: Yes     Partners: Male     Birth control/protection: None     Patient is .  Patient is employed full time with the following occupation:   Patient drinks 2 servings of caffeine per day.    Family History:  Family History   Problem Relation Age of Onset    Hypertension Father     Polymyalgia rheumatica Father     Ovarian cancer Mother     Breast cancer Mother 32    No Known Problems Brother     No Known Problems Sister     No Known Problems Son     No Known Problems Daughter      "Alcohol abuse Paternal Grandfather     Stroke Paternal Grandfather     Melanoma Paternal Grandmother 70    Skin cancer Paternal Grandmother     No Known Problems Maternal Grandmother     No Known Problems Maternal Aunt     No Known Problems Paternal Aunt     Breast cancer Paternal Aunt         p great aunt  unknown age     BRCA 1/2 Neg Hx     Colon cancer Neg Hx     Endometrial cancer Neg Hx     Uterine cancer Neg Hx        Vital Signs:  /78 (BP Location: Left arm, Patient Position: Sitting, Cuff Size: Adult)   Pulse 74   Ht 160 cm (62.99\")   Wt 51.3 kg (113 lb)   LMP 01/01/2023   SpO2 100%   BMI 20.02 kg/m²      Medications:    Current Outpatient Medications:     metoprolol succinate XL (TOPROL-XL) 50 MG 24 hr tablet, Take 1 tablet by mouth Daily., Disp: , Rfl:     multivitamin with minerals (MULTIVITAMIN ADULT PO), Take 1 tablet by mouth Daily., Disp: , Rfl:     VITAMIN D, CHOLECALCIFEROL, PO, Take  by mouth., Disp: , Rfl:     fluticasone (FLONASE) 50 MCG/ACT nasal spray, 2 sprays into the nostril(s) as directed by provider Daily. (Patient not taking: Reported on 8/30/2024), Disp: , Rfl:     HYDROcodone-acetaminophen (NORCO) 5-325 MG per tablet, 1-2 tabs po q 4-6hr prn pain, wean to tylenol, Disp: 10 tablet, Rfl: 0    ondansetron (ZOFRAN) 4 MG tablet, Take 1 tablet by mouth Every 8 (Eight) Hours As Needed for Nausea or Vomiting. May take 8 mg po q8hr prn is 4mg is not effective, Disp: 10 tablet, Rfl: 1     Allergies:  Allergies   Allergen Reactions    Nitrofurantoin Hives    Ciprofloxacin-Hydrocortisone Hives    Dilaudid [Hydromorphone Hcl] Hives    Hibiclens [Chlorhexidine Gluconate] Rash       Physical Examination:  /78 (BP Location: Left arm, Patient Position: Sitting, Cuff Size: Adult)   Pulse 74   Ht 160 cm (62.99\")   Wt 51.3 kg (113 lb)   LMP 01/01/2023   SpO2 100%   BMI 20.02 kg/m²   General Appearance:  Patient is in no distress.  She is well kept and has an thin build. "   Psychiatric:  Patient with appropriate mood and affect. Alert and oriented to self, time, and place.    Breast, RIGHT:  small sized, 34B, symmetric with the contralateral side.  Breast skin is without erythema, edema, rashes.  There are no visible abnormalities upon inspection during the arm-raising maneuver or with hands on hips in the sitting position.  There is a slight indention at the RIGHT 7:00 periaerolar location form her recent biopsy (she states that she did not notice this prior) There is no other nipple retraction, discharge or nipple/areolar skin changes.There are no masses palpable in the sitting or supine positions.    Breast, LEFT:  small sized, 34B,  symmetric with the contralateral side.  Breast skin is without erythema, edema, rashes.  There are no visible abnormalities upon inspection during the arm-raising maneuver or with hands on hips in the sitting position. There is no nipple retraction, discharge or nipple/areolar skin changes.There are no masses palpable in the sitting or supine positions.    Lymphatic:  There is no axillary, cervical, infraclavicular, or supraclavicular adenopathy bilaterally.  Eyes:  Pupils are round and reactive to light.  Cardiovascular:  Heart rate and rhythm are regular.  Respiratory:  Lungs are clear bilaterally with no crackles or wheezes in any lung field.  Gastrointestinal:  Abdomen is soft, nondistended, and nontender.     Musculoskeletal:  Good strength in all 4 extremities.   There is good range of motion in both shoulders.    Skin:  No new skin lesions or rashes on the skin excluding the breast (see breast exam above).        Imagin2016  [facility]  Mammo  [facility]  Screening mammogram.  No change. No evidence of malignancy.  BIRADS 2, Negative.    2017  [facility]  Mammo  [facility] [place]  MAMMO SCREENING  Heterogeneously dense. I see no new or dominant masses, areas of architectural distortion or skin  thickening. There is no evidence for axillary distortion or skin thickening. There is no evidence for axillary lymphadenopathy or nipple retraction. Scattered punctate monomorphic calcifications are seen throughout both breasts.  BIRADS Category 2, Benign.    11/16/2018  [facility]  Screening mammogram.  Extremely dense.  There are multiple stable milk of calcium and punctate calcifications with diffuse/scattered distribution seen in both breasts. This is a typically benign pattern/process. No suspicious masses, suspicious microcalcifications or new areas of architectural distortion are identified. There has been no significant change from the prior.  BIRADS Category 2, Benign.    [facility] December 2, 2019 right diagnostic mammogram with 3D.  The breast is extremely dense.  Superior asymmetry does not persist.  On ultrasound no suspicious mass calcifications or other abnormalities.  BI-RADS 1.     MRI November 18, 2019 bilateral breast MRI [facility].  No findings suspicious in either breast BI-RADS 2.    [facility] December 2, 2019 right diagnostic mammogram with 3D.  The breast is extremely dense.  Superior asymmetry does not persist.  On ultrasound no suspicious mass calcifications or other abnormalities.  BI-RADS 1.     MRI November 18, 2019 bilateral breast MRI [facility].  No findings suspicious in either breast BI-RADS 2.      Bilateral breast MRI November 20, 2020: [facility] right breast no suspicious enhancing mass, left breast no suspicious enhancing mass.  BI-RADS 1  [facility] bilateral screening mammogram with tomosynthesis November 20, 2020: The breasts are heterogenous Loy dense.  BI-RADS 2    11/08/2021, Bilateral Screening MMG w/[facility] ([facility])  Extremely dense  BI-RADS 2: Benign findings    11/29/2021, MRI Breast Bilateral ([facility])  No MRI evidence of malignancy in either  breast.    11/09/2022, Bilateral Screening MMG w/[facility] ([facility])  Extremely dense  BI-RADS 2: Benign findings    05/01/2023, MRI Breast Bilateral ([facility])  No MRI evidence of malignancy in either breast.  BI-RADS 2: Benign findings    11/16/2023, Bilateral Screening MMG w/[facility] ([facility])  BI-RADS 1: Negative    05/16/2024, MRI Breast Bilateral ([facility])  RIGHT BREAST:  At 7:00 in the middle to anterior right breast, 2 cm posterior to the nipple, there is a 0.8 x 0.7 x 1.1 cm enhancing possibly cystic and solid mass.  IMPRESSION AND RECOMMENDATION  Suspicious 1.1 cm mass at 7:00 in the right breast. Recommend further evaluation with targeted ultrasound, followed by possible ultrasound-guided core needle biopsy.  BI-RADS 4: Suspicious    Pathology:  06/19/2024, MRI Breast Biopsy ([facility])  9-Gauge, 7 core samples. A stoplight clip was deployed. Stoplight clip was approximately 1.2 cm medial to the expected location of the biopsy site.  IMPRESSION/RECOMMENDATIONS:  Pathology is concordant. Recommend surgical management.    SURGICAL PATHOLOGY REPORT  1.  Breast, right, 7:00, MRI biopsy (stoplight clip): Benign breast tissue with               -A.  Nonintact intraductal papilloma measuring up to 3.3 mm on the slide               -B.  Calcium oxalate crystals identified               -C.  Clustered cysts with microcalcifications in the surrounding cyst wall with inflammation               -D.  Duct ectasia               -E.  Sclerosing adenosis               -F.  Fibroadenomatoid hyperplasia               -G. Moderate ductal hyperplasia of the usual type    Labs:  09/30/2019  [facility] [place] [facility]  BRCA1 and BRCA2 Aynalysis  BRCA2 - c.4747A>T; UNCERTAIN CLINICAL SIGNIFICANCE      [facility] Screen  05/17/2019  [facility]  GENETIC RESULT: NEGATIVE - NO CLINICALLY SIGNIFICANT MUTATION IDENTIFIED  ADDITIONAL FINDINGS:  GENE    VARIANT(S) OF UNCERTAIN  SIGNIFICANCE  BRCA2    c.4747A>T  MLH1    c. 896G>A          Procedures:      Assessment:   Diagnosis Plan   1. Abnormal MRI, breast  HYDROcodone-acetaminophen (NORCO) 5-325 MG per tablet      2. Papilloma of right breast  HYDROcodone-acetaminophen (NORCO) 5-325 MG per tablet      3. Family history of breast cancer        4. Dense breasts        5. Increased risk of breast cancer               1-2  RIGHT breast 7:00, central- 1.1 cm on MRI, not seen on mamomgram or UD, no associated symptoms- Core biopsy  returned as a papilloma fragmented, 3.3mm in one fragment, FCC, FA hyperplasia, UDH  Stoplight marker migrated medially 1.2cm  - have emailed Dr Colorado to localize papilloma and biopsy site rather than the clip    Target for excision    3-  Mother age 32, , did not have genetic testing  PGM age 70s    My Risk sent   -VUS in BRCA2 c.4747A>T  -VUS in MLH1 c.896G>A      4-  Extremely dense breast tissue on mammography        Plan:  Cari and her  Ian are neighbors in [facility].  She and I reviewed her most recent imaging, exam and pathology findings. We reviewed the nature of papillomas. We discussed the option of excision versus imaging FU for cases of papillomas where:  1- there is no atypia at the time of core  2- the biopsy is a large gauge core needle with multiple samples, supporting adequate sampling  3- there is no residual on imaging or lesion appears to have been completely removed on imaging      She knows that there can occasionally be a pathologic upgrade where the above criteria are not met.  We discussed that peripheral papillomas have a higher degree of association with atypia than central.    She has a central first papilloma. It is over 1 cm and seen on ly on MRI.    We discussed serial imaging and clinical FU versus excision and we prefer the latter.    We will plan for a RIGHT breast needle localized excision papilloma.    Discussed risks bleeding, infection, altered  nipple sensation.    Previously, ee reviewed the option of talking with medical oncology about risk reducing medications and she is certain that she is not interested in this consultation.      Previously we discussed her risk assessment and she follows with Patrick in the high risk clinic.    Her next mammogram is due 11-17-24 at [facility] and her next MRI due 5-17-25 at [facility].      Caitie Solis MD      Next Appointment:  Return for surgery.    Today I spent 45 minutes doing the following: Reviewing records, labs, outside imaging and reports in preparation for the patient visit; obtaining medical history; performing the physical exam; counseling and educating the patient and any available family or caregivers; ordering medications, tests or procedures; coordinating care with any other physicians on her care team as needed, and documenting all of the above in the medical record as well as sending communications with her other healthcare professionals.    EMR Dragon/transcription disclaimer:    Much of this encounter note is an electronic transcription/translocation of spoken language to printed text.  The electronic translation of spoken language may permit erroneous, or at times, nonsensical words or phrases to be inadvertently transcribed.  Although I have reviewed the note from such areas, some may still exist.

## 2024-10-24 NOTE — OP NOTE
Operative note    Preoperative Diagnosis: Papilloma    Postoperative Diagnosis:Papilloma    Procedure Performed:right breast needle localized excisional biopsy    Dictating physician and surgeon: Caitie Solis MD    EBL:<10cc    FINDINGS AND DESCRIPTION OF PROCEDURE:     The patient was brought to the operating room and placed on the table in the supine position. After adequate general LMA anesthesia was obtained, we sterily prepped and draped the breast and. We did a time out to identify correct patient and correct operative site.  She did receive IV antibiotic within an hour of and prior to incision.     I reviewed the mammographic localization films and planned the following incision accordingly: periareolar inferiorly, no skin removal    I used a 15 blade to incise the skin, dissected around the localization needle and removed the specimen. I labeled this: long stitch lateral short stitch superior, double stitch deep.    I took an x-ray with the Faxitron and confirmed the lesion of interest/marker in the specimen.    I then irrigated, assured hemostasis and closed a deep layer of breast tissue using a running 2-0 Vicryl, followed by a superficial layer of breast using a running 2-0 Vicryl. I then closed the skin in 2 layers- the first being an interrupted 3-0 Vicryl layer, followed by a running 4-0 Monocryl.    I then applied lengthwise 1/2 inch Steri-Strips, an island dressing. Then I wrapped her in 4x4s and a 6 inch ACE wrap.    She tolerated the procedure well, there were no immediate complications, and all counts were correct at the end of the case.

## 2024-10-24 NOTE — ANESTHESIA PROCEDURE NOTES
Airway  Urgency: elective    Date/Time: 10/24/2024 8:39 AM  Airway not difficult    General Information and Staff    Patient location during procedure: OR  Anesthesiologist: Nataly Nunn MD  CRNA/CAA: Aubrie Walsh CRNA    Indications and Patient Condition  Indications for airway management: airway protection    Preoxygenated: yes  MILS maintained throughout  Mask difficulty assessment: 1 - vent by mask    Final Airway Details  Final airway type: supraglottic airway      Successful airway: classic  Size 3     Number of attempts at approach: 1  Assessment: lips, teeth, and gum same as pre-op    Additional Comments  Pt preoxygenated, sivi, LMA placed with adequate seal and TV

## 2024-10-24 NOTE — DISCHARGE INSTRUCTIONS
Dr. Caitie Solis  [address]  [phone]    Lumpectomy, [place] Node Biopsy, Excisional Breast Biopsy, Postoperative Discharge Instructions         Keep 6 inch ace wrap in place and dressings dry postoperatively for a total of 72 hours. May then remove dressings and ACE wrap. Can shower and get affected area wet after dressings are removed. Prefer no soaking in the tub for one week. Leave steri-strips in place.   A responsible adult should accompany the patient on discharge and for 24 hours following surgery.   No heavy lifting (>5 lbs) or strenuous upper arm activity, and no raising of arms over the head until followup appointment.   For 24 hours postoperatively, or while taking pain or nausea medications - Do not drive, operate machinery or drink alcohol.   Call the office for any of the following symptoms:    Persistent bleeding   Excessive bruising or swelling   Excessive sleepiness or trouble breathing   Severe pain   Persistent nausea or vomiting   Fever greater than 101.   Patient should keep the postoperative visit that was scheduled for him/her in [facility]. If the patient has forgotten this date, please call the clinic for a reminder of the appointment time. If it is after hours, the patient can call the clinic the next business day for this reminder. [facility] phone number is [phone].

## 2024-10-24 NOTE — ANESTHESIA PREPROCEDURE EVALUATION
Anesthesia Evaluation     history of anesthetic complications:  PONV               Airway   Mallampati: I  TM distance: >3 FB  Neck ROM: full  Dental      Comment: Cap lower front tooth    Pulmonary    (-) shortness of breath, recent URI, sleep apnea, not a smoker  Cardiovascular     (+) hypertension  (-) dysrhythmias, angina, hyperlipidemia      Neuro/Psych  (+) headaches  GI/Hepatic/Renal/Endo      Musculoskeletal     Abdominal    Substance History      OB/GYN          Other                    Anesthesia Plan    ASA 2     general     intravenous induction     Anesthetic plan, risks, benefits, and alternatives have been provided, discussed and informed consent has been obtained with: patient.    CODE STATUS:

## 2024-10-24 NOTE — ANESTHESIA POSTPROCEDURE EVALUATION
Patient: Cari Mathis    Procedure Summary       Date: 10/24/24 Room / Location: [facility] OR [address] [place] [address] MAIN OR    Anesthesia Start: 0828 Anesthesia Stop: 0944    Procedure: RIGHT breast needle localized excision papilloma. (Right: Breast) Diagnosis:       Papilloma of right breast      (Papilloma of right breast [D24.1])    Surgeons: Caitie Solis MD Provider: Nataly Nunn MD    Anesthesia Type: general ASA Status: 2            Anesthesia Type: general    Vitals  Vitals Value Taken Time   /71 10/24/24 1045   Temp 36.4 °C (97.6 °F) 10/24/24 1045   Pulse 65 10/24/24 1113   Resp 20 10/24/24 0941   SpO2 100 % 10/24/24 1113   Vitals shown include unfiled device data.        Post Anesthesia Care and Evaluation    Anesthetic complications: No anesthetic complications

## 2024-10-25 LAB
CYTO UR: NORMAL
LAB AP CASE REPORT: NORMAL
LAB AP CLINICAL INFORMATION: NORMAL
LAB AP DIAGNOSIS COMMENT: NORMAL
LAB AP SPECIAL STAINS: NORMAL
PATH REPORT.FINAL DX SPEC: NORMAL
PATH REPORT.GROSS SPEC: NORMAL

## 2024-10-28 ENCOUNTER — TELEPHONE (OUTPATIENT)
Dept: SURGERY | Facility: CLINIC | Age: 49
End: 2024-10-28

## 2024-10-28 NOTE — TELEPHONE ENCOUNTER
10-24-24 RIGHT breast needle localized excision papilloma returned as :  Atypical lobular hyperplasia, sclerosing adenosis, UDH, biopsy site and marker identified.  I will let her know  Patrick will recalculate her risk assessment and perhaps she will reconsider a visit with medical oncology      Final Diagnosis   1.  Breast, right, excisional biopsy: Breast tissue with               -A.  Atypical lobular hyperplasia with focal calcification               -B. Sclerosing adenosis               -C.   Florid ductal hyperplasia of the usual type               -D.  Biopsy site change identified and stoplight clip               -E.  Margins free of atypia, in situ and invasive disease   Electronically signed by Paulino Abreu MD on 10/25/2024 at 1543   Comment    The previous case [id number] was reviewed.

## 2024-10-31 ENCOUNTER — OFFICE VISIT (OUTPATIENT)
Dept: SURGERY | Facility: CLINIC | Age: 49
End: 2024-10-31
Payer: COMMERCIAL

## 2024-10-31 VITALS
WEIGHT: 112 LBS | OXYGEN SATURATION: 100 % | HEIGHT: 62 IN | DIASTOLIC BLOOD PRESSURE: 84 MMHG | HEART RATE: 70 BPM | SYSTOLIC BLOOD PRESSURE: 132 MMHG | BODY MASS INDEX: 20.61 KG/M2

## 2024-10-31 DIAGNOSIS — Z80.3 FAMILY HISTORY OF BREAST CANCER: ICD-10-CM

## 2024-10-31 DIAGNOSIS — Z91.89 INCREASED RISK OF BREAST CANCER: ICD-10-CM

## 2024-10-31 DIAGNOSIS — D24.1 PAPILLOMA OF RIGHT BREAST: ICD-10-CM

## 2024-10-31 DIAGNOSIS — N60.91 ATYPICAL LOBULAR HYPERPLASIA (ALH) OF RIGHT BREAST: Primary | ICD-10-CM

## 2024-10-31 DIAGNOSIS — R92.8 ABNORMAL MRI, BREAST: ICD-10-CM

## 2024-10-31 DIAGNOSIS — R92.30 DENSE BREASTS: ICD-10-CM

## 2024-10-31 PROCEDURE — 99024 POSTOP FOLLOW-UP VISIT: CPT | Performed by: SURGERY

## 2024-10-31 NOTE — PROGRESS NOTES
Chief Complaint: Cari Mathis is a 49 y.o. female who was seen in consultation at the request of SNOW Huber  for abnormal breast imaging, family history breast cancer, and a postoperative visit    History of Present Illness:  Patient presents with management of breast cancer risk and family history breast cancer.   She noted no new masses, skin changes, nipple discharge, nipple changes prior to her most recent imaging.    Her most recent imaging includes the followin2017  [facility]  Mammo  [facility]  MAMMO SCREENING  Heterogeneously dense. I see no new or dominant masses, areas of architectural distortion or skin thickening. There is no evidence for axillary distortion or skin thickening. There is no evidence for axillary lymphadenopathy or nipple retraction. Scattered punctate monomorphic calcifications are seen throughout both breasts.  BIRADS Category 2, Benign.    2018  [facility]  Screening mammogram.  Extremely dense.  There are multiple stable milk of calcium and punctate calcifications with diffuse/scattered distribution seen in both breasts. This is a typically benign pattern/process. No suspicious masses, suspicious microcalcifications or new areas of architectural distortion are identified. There has been no significant change from the prior.  BIRADS Category 2, Benign.    She has not had a breast biopsy in the past.  She has her uterus and ovaries, is pre-perimenopausal, and takes nor hormones.  Her family history includes the following:   She has 2 daughters, 2 sisters, 1 MA, 1 PA. Her mother had breast cancer age 32, is  and did not have genetic testing. Her PGM had breast ca age 70s.      In the interim,  Cari Mathis  has done well.  She has noted no changes in her breast exam. No new masses, skin changes, nipple changes, nipple discharge either breast.     Her most recent imaging includes the following:  [facility] with [facility]D.  The breast is extremely dense.  Superior asymmetry does not persist.  On ultrasound no suspicious mass calcifications or other abnormalities.  BI-RADS 1.     MRI November 18, 2019 bilateral breast MRI [facility].  No findings suspicious in either breast BI-RADS 2.        In the interim,  Cari Mathis  has done well.  She has noted no changes in her breast exam. No new masses, skin changes, nipple changes, nipple discharge either breast.   She denies headache, bone pain, belly pain, cough, changes in vision or gait.    Her most recent imaging includes the following:  Bilateral breast MRI November 20, 2020: [facility] right breast no suspicious enhancing mass, left breast no suspicious enhancing mass.  BI-RADS 1  [facility] bilateral screening mammogram with tomosynthesis November 20, 2020: The breasts are heterogenous Loy dense.  BI-RADS 2    She is here to review.        In the interim,  Cari Mathis  has done well.  She has noted no changes in her breast exam. No new masses, skin changes, nipple changes, nipple discharge either breast.   She denies headache, bone pain, belly pain, cough, changes in vision or gait.  Her most recent imaging includes the following:  MRI November 18, 2019 bilateral breast MRI [facility].  No findings suspicious in either breast BI-RADS 2.      Bilateral breast MRI November 20, 2020: [facility] right breast no suspicious enhancing mass, left breast no suspicious enhancing mass.  BI-RADS 1  [facility] bilateral screening mammogram with tomosynthesis November 20, 2020: The breasts are heterogenous Loy dense.  BI-RADS 2    11/08/2021, Bilateral Screening MMG w/[facility] ([facility])  Extremely dense  BI-RADS 2: Benign findings    11/29/2021, MRI Breast Bilateral ([facility])  No MRI evidence of malignancy in either breast.    11/09/2022, Bilateral  Screening MMG w/[facility] ([facility])  Extremely dense  BI-RADS 2: Benign findings    05/01/2023, MRI Breast Bilateral ([facility])  No MRI evidence of malignancy in either breast.  BI-RADS 2: Benign findings    11/16/2023, Bilateral Screening MMG w/[facility] ([facility])  BI-RADS 1: Negative    05/16/2024, MRI Breast Bilateral ([facility])  RIGHT BREAST:  At 7:00 in the middle to anterior right breast, 2 cm posterior to the nipple, there is a 0.8 x 0.7 x 1.1 cm enhancing possibly cystic and solid mass.  IMPRESSION AND RECOMMENDATION  Suspicious 1.1 cm mass at 7:00 in the right breast. Recommend further evaluation with targeted ultrasound, followed by possible ultrasound-guided core needle biopsy.  BI-RADS 4: Suspicious    She then had a MRI guided biopsy:    06/19/2024, MRI Breast Biopsy ([facility])  9-Gauge, 7 core samples. A stoplight clip was deployed. Stoplight clip was approximately 1.2 cm medial to the expected location of the biopsy site.  IMPRESSION/RECOMMENDATIONS:  Pathology is concordant. Recommend surgical management.    SURGICAL PATHOLOGY REPORT  1.  Breast, right, 7:00, MRI biopsy (stoplight clip): Benign breast tissue with               -A.  Nonintact intraductal papilloma measuring up to 3.3 mm on the slide               -B.  Calcium oxalate crystals identified               -C.  Clustered cysts with microcalcifications in the surrounding cyst wall with inflammation               -D.  Duct ectasia               -E.  Sclerosing adenosis               -F.  Fibroadenomatoid hyperplasia               -G. Moderate ductal hyperplasia of the usual type    Interval HIstory:  In the interim,  Cari Mathis  has done well.  10-24-24 RIGHT breast needle localized excision papilloma returned as :  Atypical lobular hyperplasia, sclerosing adenosis, UDH, biopsy site and marker identified.      She denies redness, warmth drainage from the incision. Denies significant discomfort.          Review of Systems:  Review of Systems   Constitutional:  Positive for  unexpected weight change (4 lb wt gain since last visit).   All other systems reviewed and are negative.       Past Medical and Surgical History:  Breast Biopsy History:  Patient has not had a breast biopsy in the past.  Breast Cancer HIstory:  Patient does not have a past medical history of breast cancer.  Breast Operations, and year:  NONE   Obstetric/Gynecologic History:  Age menstrual periods began: 16  Patient is postmenopausal at age 48.  Number of pregnancies: 1  Number of live births: 2  Number of abortions or miscarriages: 0  Age of delivery of first child: 33  Patient breast fed, for the following lenth of time: 3 MONTHS   Length of time taking birth control pills: 5 YRS   Patient has never taken hormone replacement  PATIENT HAS BOTH OVARIES AND UTERUS.     Past Surgical History:   Procedure Laterality Date    ABDOMINOPLASTY      with mesh    BREAST BIOPSY Right 10/24/2024    Procedure: RIGHT breast needle localized excision papilloma.;  Surgeon: Caitie Solis MD;  Location: [facility];  Service: General;  Laterality: Right;     SECTION       Past Medical History:   Diagnosis Date    Allergic     Anemia     Anxiety     Fibrocystic breast changes, bilateral 2021    History of UTI     Hypertension     Kidney stones     Migraines     Papilloma of breast     PONV (postoperative nausea and vomiting)     Raynaud's disease        Prior Hospitalizations, other than for surgery or childbirth, and year:  KIDNEY STONES     Social History     Socioeconomic History    Marital status:    Tobacco Use    Smoking status: Never     Passive exposure: Never    Smokeless tobacco: Never   Vaping Use    Vaping status: Never Used   Substance and Sexual Activity    Alcohol use: No    Drug use: Never    Sexual activity: Yes     Partners: Male     Birth control/protection: None     Patient is .  Patient is employed full time with the following occupation:   Patient drinks 2  "servings of caffeine per day.    Family History:  Family History   Problem Relation Age of Onset    Ovarian cancer Mother     Breast cancer Mother 32    Hypertension Father     Polymyalgia rheumatica Father     No Known Problems Sister     No Known Problems Brother     No Known Problems Daughter     No Known Problems Son     No Known Problems Maternal Aunt     No Known Problems Paternal Aunt     Breast cancer Paternal Aunt         p great aunt  unknown age     No Known Problems Maternal Grandmother     Melanoma Paternal Grandmother 70    Skin cancer Paternal Grandmother     Alcohol abuse Paternal Grandfather     Stroke Paternal Grandfather     BRCA 1/2 Neg Hx     Colon cancer Neg Hx     Endometrial cancer Neg Hx     Uterine cancer Neg Hx     Malig Hyperthermia Neg Hx        Vital Signs:  /84 (BP Location: Right arm, Patient Position: Sitting, Cuff Size: Adult)   Pulse 70   Ht 157.5 cm (62.01\")   Wt 50.8 kg (112 lb)   LMP 01/01/2023   SpO2 100%   BMI 20.48 kg/m²      Medications:    Current Outpatient Medications:     fluticasone (FLONASE) 50 MCG/ACT nasal spray, Administer 2 sprays into the nostril(s) as directed by provider Daily As Needed., Disp: , Rfl:     metoprolol succinate XL (TOPROL-XL) 50 MG 24 hr tablet, Take 1 tablet by mouth Daily., Disp: , Rfl:     multivitamin with minerals (MULTIVITAMIN ADULT PO), Take 1 tablet by mouth Daily. HOLD PRIOR TO SURGERY, Disp: , Rfl:     VITAMIN D, CHOLECALCIFEROL, PO, Take  by mouth., Disp: , Rfl:      Allergies:  Allergies   Allergen Reactions    Nitrofurantoin Hives    Ciprofloxacin-Hydrocortisone Hives     CIPRO AND DILAUDID TAKEN SIMULTANEOUSLY WHEN REACTION OCCURED    Dilaudid [Hydromorphone Hcl] Hives     CIPRO AND DILAUDID TAKEN SIMULTANEOUSLY WHEN REACTION OCCURED    Hibiclens [Chlorhexidine Gluconate] Rash       Physical Examination:  /84 (BP Location: Right arm, Patient Position: Sitting, Cuff Size: Adult)   Pulse 70   Ht 157.5 cm (62.01\")   " Wt 50.8 kg (112 lb)   LMP 2023   SpO2 100%   BMI 20.48 kg/m²   General Appearance:  Patient is in no distress.  She is well kept and has an thin build.   Psychiatric:  Patient with appropriate mood and affect. Alert and oriented to self, time, and place.    Breast, RIGHT:  small sized, 34B, symmetric with the contralateral side.  Breast skin is without erythema, edema, rashes.  There are no visible abnormalities upon inspection during the arm-raising maneuver or with hands on hips in the sitting position.  RIGHT 6-7:00 periareolar incision from her  excision of papilloma and incidentally found ALH. No erythema, warmth, drainage. There is no other nipple retraction, discharge or nipple/areolar skin changes.There are no masses palpable in the sitting or supine positions.    Breast, LEFT:  small sized, 34B,  symmetric with the contralateral side.  Breast skin is without erythema, edema, rashes.  There are no visible abnormalities upon inspection during the arm-raising maneuver or with hands on hips in the sitting position. There is no nipple retraction, discharge or nipple/areolar skin changes.There are no masses palpable in the sitting or supine positions.    Lymphatic:  There is no axillary, cervical, infraclavicular, or supraclavicular adenopathy bilaterally.  Eyes:  Pupils are round and reactive to light.  Cardiovascular:  Heart rate and rhythm are regular.  Respiratory:  Lungs are clear bilaterally with no crackles or wheezes in any lung field.  Gastrointestinal:  Abdomen is soft, nondistended, and nontender.     Musculoskeletal:  Good strength in all 4 extremities.   There is good range of motion in both shoulders.    Skin:  No new skin lesions or rashes on the skin excluding the breast (see breast exam above).        [facility].  No change. No evidence of malignancy.  BIRADS 2, Negative.    [facility]  MAMMO SCREENING  Heterogeneously dense. I see no new or dominant masses, areas of architectural distortion or skin thickening. There is no evidence for axillary distortion or skin thickening. There is no evidence for axillary lymphadenopathy or nipple retraction. Scattered punctate monomorphic calcifications are seen throughout both breasts.  BIRADS Category 2, Benign.    11/16/2018  [facility]  Screening mammogram.  Extremely dense.  There are multiple stable milk of calcium and punctate calcifications with diffuse/scattered distribution seen in both breasts. This is a typically benign pattern/process. No suspicious masses, suspicious microcalcifications or new areas of architectural distortion are identified. There has been no significant change from the prior.  BIRADS Category 2, Benign.    [facility] December 2, 2019 right diagnostic mammogram with 3D.  The breast is extremely dense.  Superior asymmetry does not persist.  On ultrasound no suspicious mass calcifications or other abnormalities.  BI-RADS 1.     MRI November 18, 2019 bilateral breast MRI [facility].  No findings suspicious in either breast BI-RADS 2.    [facility] December 2, 2019 right diagnostic mammogram with 3D.  The breast is extremely dense.  Superior asymmetry does not persist.  On ultrasound no suspicious mass calcifications or other abnormalities.  BI-RADS 1.     MRI November 18, 2019 bilateral breast MRI [facility].  No findings suspicious in either breast BI-RADS 2.      Bilateral breast MRI November 20, 2020: [facility] right breast no suspicious enhancing mass, left breast no suspicious enhancing mass.  BI-RADS 1  [facility] bilateral screening mammogram with tomosynthesis November 20, 2020: The breasts are heterogenous Loy dense.  BI-RADS 2    11/08/2021, Bilateral Screening MMG  w/[facility] ([facility])  Extremely dense  BI-RADS 2: Benign findings    11/29/2021, MRI Breast Bilateral ([facility])  No MRI evidence of malignancy in either breast.    11/09/2022, Bilateral Screening MMG w/[facility] ([facility])  Extremely dense  BI-RADS 2: Benign findings    05/01/2023, MRI Breast Bilateral ([facility])  No MRI evidence of malignancy in either breast.  BI-RADS 2: Benign findings    11/16/2023, Bilateral Screening MMG w/[facility] ([facility])  BI-RADS 1: Negative    05/16/2024, MRI Breast Bilateral ([facility])  RIGHT BREAST:  At 7:00 in the middle to anterior right breast, 2 cm posterior to the nipple, there is a 0.8 x 0.7 x 1.1 cm enhancing possibly cystic and solid mass.  IMPRESSION AND RECOMMENDATION  Suspicious 1.1 cm mass at 7:00 in the right breast. Recommend further evaluation with targeted ultrasound, followed by possible ultrasound-guided core needle biopsy.  BI-RADS 4: Suspicious    Pathology:  06/19/2024, MRI Breast Biopsy ([facility])  9-Gauge, 7 core samples. A stoplight clip was deployed. Stoplight clip was approximately 1.2 cm medial to the expected location of the biopsy site.  IMPRESSION/RECOMMENDATIONS:  Pathology is concordant. Recommend surgical management.    SURGICAL PATHOLOGY REPORT  1.  Breast, right, 7:00, MRI biopsy (stoplight clip): Benign breast tissue with               -A.  Nonintact intraductal papilloma measuring up to 3.3 mm on the slide               -B.  Calcium oxalate crystals identified               -C.  Clustered cysts with microcalcifications in the surrounding cyst wall with inflammation               -D.  Duct ectasia               -E.  Sclerosing adenosis               -F.  Fibroadenomatoid hyperplasia               -G. Moderate ductal hyperplasia of the usual type    10-24-24 RIGHT breast needle localized excision papilloma returned as :  Atypical lobular hyperplasia, sclerosing adenosis, UDH, biopsy site and marker identified.  I will let her know  Patrick will recalculate her risk assessment and perhaps she will  reconsider a visit with medical oncology            Final Diagnosis   1.  Breast, right, excisional biopsy: Breast tissue with               -A.  Atypical lobular hyperplasia with focal calcification               -B. Sclerosing adenosis               -C.   Florid ductal hyperplasia of the usual type               -D.  Biopsy site change identified and stoplight clip               -E.  Margins free of atypia, in situ and invasive disease   Electronically signed by Paulino Abreu MD on 10/25/2024 at 1543   Comment     The previous case [id number] was reviewed.                  Labs:  2019  [employer]  Genetic   [facility]  BRCA1 and BRCA2 Aynalysis  BRCA2 - c.4747A>T; UNCERTAIN CLINICAL SIGNIFICANCE      Health As We Age Hereditary Cancer Screen  2019  [employer]  GENETIC RESULT: NEGATIVE - NO CLINICALLY SIGNIFICANT MUTATION IDENTIFIED  ADDITIONAL FINDINGS:  GENE    VARIANT(S) OF UNCERTAIN SIGNIFICANCE  BRCA2    c.4747A>T  MLH1    c. 896G>A          Procedures:      Assessment:   Diagnosis Plan   1. Atypical lobular hyperplasia (ALH) of right breast  Mammo Screening Digital Tomosynthesis Bilateral With CAD      2. Papilloma of right breast        3. Abnormal MRI, breast        4. Family history of breast cancer        5. Dense breasts        6. Increased risk of breast cancer                 1-3  RIGHT breast 7:00, central- 1.1 cm on MRI, not seen on mamomgram or UD, no associated symptoms- Core biopsy  returned as a papilloma fragmented, 3.3mm in one fragment, FCC, FA hyperplasia, UDH  Stoplight marker migrated medially 1.2cm  - have emailed Dr [place] to localize papilloma and biopsy site rather than the clip    10-24-24 RIGHT breast needle localized excision papilloma returned as :  Atypical lobular hyperplasia, sclerosing adenosis, UDH, biopsy site and marker identified.      4-  Mother age 32, , did not have genetic testing  PGM age 70s    My Risk  sent 5-2019  -VUS in BRCA2 c.4747A>T  -VUS in MLH1 c.896G>A      5-  Extremely dense breast tissue on mammography        Plan:  Cari and her  Ian are neighbors in [facility].  We reviewed her  pathology report and exam together today.    We discussed that ALH is an incidental finding and that it places her at elevated risk for developing breast cancer in either breast moving forward.    I will arrange for her to see medical oncology to discuss hormone blocking medications.      I will delay her  mammogram at [facility] until February 2025 since she is so fresh postop. She will see Patrick back after and have her risk assessment redone.      Her next MRi would be due 5-2025, but Patrick may move this out as well to get a 6 month interval.      Caitie Solis MD    Next Appointment:  Return for after imaging, Next scheduled follow up, with Patrick Rodríguez.      EMR Dragon/transcription disclaimer:    Much of this encounter note is an electronic transcription/translocation of spoken language to printed text.  The electronic translation of spoken language may permit erroneous, or at times, nonsensical words or phrases to be inadvertently transcribed.  Although I have reviewed the note from such areas, some may still exist.

## 2025-01-02 DIAGNOSIS — N60.91 ATYPICAL LOBULAR HYPERPLASIA (ALH) OF RIGHT BREAST: Primary | ICD-10-CM

## 2025-02-06 ENCOUNTER — APPOINTMENT (OUTPATIENT)
Dept: WOMENS IMAGING | Facility: HOSPITAL | Age: 50
End: 2025-02-06
Payer: COMMERCIAL

## 2025-02-06 PROCEDURE — 77067 SCR MAMMO BI INCL CAD: CPT | Performed by: RADIOLOGY

## 2025-02-06 PROCEDURE — 77063 BREAST TOMOSYNTHESIS BI: CPT | Performed by: RADIOLOGY

## 2025-02-17 NOTE — PROGRESS NOTES
Chief Complaint: Cari Mathis is a 49 y.o. female who was seen in consultation at the request of SNOW Huber  for  GENETIC EVALUATION, family history breast cancer and a followup visit    History of Present Illness:  19: seen by Dr Solis  Patient presents with management of breast cancer risk and family history breast cancer.   She noted no new masses, skin changes, nipple discharge, nipple changes prior to her most recent imaging.    Her most recent imaging includes the followin2017  [facility]  Mammo  Cari Gueydan  MAMMO SCREENING  Heterogeneously dense. I see no new or dominant masses, areas of architectural distortion or skin thickening. There is no evidence for axillary distortion or skin thickening. There is no evidence for axillary lymphadenopathy or nipple retraction. Scattered punctate monomorphic calcifications are seen throughout both breasts.  BIRADS Category 2, Benign.    2018  [facility]  Cari Mathis  Screening mammogram.  Extremely dense.  There are multiple stable milk of calcium and punctate calcifications with diffuse/scattered distribution seen in both breasts. This is a typically benign pattern/process. No suspicious masses, suspicious microcalcifications or new areas of architectural distortion are identified. There has been no significant change from the prior.  BIRADS Category 2, Benign.    She has not had a breast biopsy in the past.  She has her uterus and ovaries, is pre-perimenopausal, and takes nor hormones.  Her family history includes the following:   She has 2 daughters, 2 sisters, 1 MA, 1 PA. Her mother had breast cancer age 32, is  and did not have genetic testing. Her PGM had breast ca age 70s.    2019:  In the interim,  Cari Mathis  has done well.  She has noted no changes in her breast exam. No new masses, skin changes, nipple changes, nipple discharge either breast.     Her most recent imaging  includes the following:  [facility] with [facility]D.  The breast is extremely dense.  Superior asymmetry does not persist.  On ultrasound no suspicious mass calcifications or other abnormalities.  BI-RADS 1.     MRI November 18, 2019 bilateral breast MRI [facility].  No findings suspicious in either breast BI-RADS 2.    12/1/2020:   In the interim,  Cari Mathis  has done well.  She has noted no changes in her breast exam. No new masses, skin changes, nipple changes, nipple discharge either breast.   She denies headache, bone pain, belly pain, cough, changes in vision or gait.    Her most recent imaging includes the following:  Bilateral breast MRI November 20, 2020: [facility] right breast no suspicious enhancing mass, left breast no suspicious enhancing mass.  BI-RADS 1  [facility] bilateral screening mammogram with tomosynthesis November 20, 2020: The breasts are heterogenous Loy dense.  BI-RADS 2    11/29/21  She returns today for follow up with no breast changes other than tenderness that is c/w hormone changes.  Her menses have become irregular.    Screening with tomosynthesis at [facility] on 11/8/21 was stable, BiRAds 2. (see full report below)    Breast MRI was completed today, 11/29/21, results pending.    6/1/2023   Presenting to the office today for routine follow-up.  She has no new breast complaints or concerns today.  Breast MRI on May 1, 2023 returned as BI-RADS 2.    11/30/2023   Patient presenting to the office today for routine follow-up.  She has no new breast complaints or concerns today.  Mammogram from 11/16/2023 resulted as BI-RADS 1.    2/17/2025 interval history  Patient presenting back to me after seeing Dr. Solis who did a localized excision papilloma right breast on 10/24/2024 which returned as atypical lobular hyperplasia, sclerosing adenosis, UDH,  2/6/2025 she had a bilateral screening  mammogram that resulted as BI-RADS 2.  She has an appointment with med onc this Thursday     Review of Systems:  Review of Systems   Endocrine: Negative for hot flashes.   All other systems reviewed and are negative.       Past Medical and Surgical History:  Breast Biopsy History:  Patient has not had a breast biopsy in the past.  Breast Cancer HIstory:  Patient does not have a past medical history of breast cancer.  Breast Operations, and year:  NONE   Obstetric/Gynecologic History:  Age menstrual periods began: 16  Patient is premenopausal, first day of last period: 16  Number of pregnancies: 1  Number of live births: 2  Number of abortions or miscarriages: 0  Age of delivery of first child: 33  Patient breast fed, for the following lenth of time: 3 MONTHS   Length of time taking birth control pills: 5 YRS   Patient has never taken hormone replacement  PATIENT HAS BOTH OVARIES AND UTERUS.     Past Surgical History:   Procedure Laterality Date    ABDOMINOPLASTY      with mesh    BREAST BIOPSY Right 10/24/2024    Procedure: RIGHT breast needle localized excision papilloma.;  Surgeon: Caitie Solis MD;  Location: [facility];  Service: General;  Laterality: Right;     SECTION       Past Medical History:   Diagnosis Date    Allergic     Anemia     Anxiety     Fibrocystic breast changes, bilateral 2021    History of UTI     Hypertension     Kidney stones     Migraines     Papilloma of breast     PONV (postoperative nausea and vomiting)     Raynaud's disease        Prior Hospitalizations, other than for surgery or childbirth, and year:  KIDNEY STONES     Social History     Socioeconomic History    Marital status:    Tobacco Use    Smoking status: Never     Passive exposure: Never    Smokeless tobacco: Never   Vaping Use    Vaping status: Never Used   Substance and Sexual Activity    Alcohol use: No    Drug use: Never    Sexual activity: Yes     Partners: Male     Birth control/protection:  None     Patient is .  Patient is employed full time with the following occupation:   Patient drinks 2 servings of caffeine per day.    Family History:  Family History   Problem Relation Age of Onset    Ovarian cancer Mother     Breast cancer Mother 32    Hypertension Father     Polymyalgia rheumatica Father     No Known Problems Sister     No Known Problems Brother     No Known Problems Daughter     No Known Problems Son     No Known Problems Maternal Aunt     No Known Problems Paternal Aunt     Breast cancer Paternal Aunt         p great aunt  unknown age     No Known Problems Maternal Grandmother     Melanoma Paternal Grandmother 70    Skin cancer Paternal Grandmother     Alcohol abuse Paternal Grandfather     Stroke Paternal Grandfather     BRCA 1/2 Neg Hx     Colon cancer Neg Hx     Endometrial cancer Neg Hx     Uterine cancer Neg Hx     Malig Hyperthermia Neg Hx        Vital Signs:  LMP 01/01/2023      Medications:    Current Outpatient Medications:     fluticasone (FLONASE) 50 MCG/ACT nasal spray, Administer 2 sprays into the nostril(s) as directed by provider Daily As Needed., Disp: , Rfl:     metoprolol succinate XL (TOPROL-XL) 50 MG 24 hr tablet, Take 1 tablet by mouth Daily., Disp: , Rfl:     multivitamin with minerals (MULTIVITAMIN ADULT PO), Take 1 tablet by mouth Daily. HOLD PRIOR TO SURGERY, Disp: , Rfl:     VITAMIN D, CHOLECALCIFEROL, PO, Take  by mouth., Disp: , Rfl:      Allergies:  Allergies   Allergen Reactions    Nitrofurantoin Hives    Ciprofloxacin-Hydrocortisone Hives     CIPRO AND DILAUDID TAKEN SIMULTANEOUSLY WHEN REACTION OCCURED    Dilaudid [Hydromorphone Hcl] Hives     CIPRO AND DILAUDID TAKEN SIMULTANEOUSLY WHEN REACTION OCCURED    Hibiclens [Chlorhexidine Gluconate] Rash       Physical Examination:  LMP 01/01/2023      I reviewed physical exam, no changes noted    General Appearance:  Patient is in no distress.  She is well kept and has an thin build.    Psychiatric:  Patient with appropriate mood and affect. Alert and oriented to self, time, and place.    Breast, RIGHT:  small sized, 34B, symmetric with the contralateral side.  Breast skin is without erythema, edema, rashes.  There are no visible abnormalities upon inspection during the arm-raising maneuver or with hands on hips in the sitting position. There is no nipple retraction, discharge or nipple/areolar skin changes.There are no masses palpable in the sitting or supine positions.    Breast, LEFT:  small sized, 34B,  symmetric with the contralateral side.  Breast skin is without erythema, edema, rashes.  There are no visible abnormalities upon inspection during the arm-raising maneuver or with hands on hips in the sitting position. There is no nipple retraction, discharge or nipple/areolar skin changes.There are no masses palpable in the sitting or supine positions.    Lymphatic:  There is no axillary, cervical, infraclavicular, or supraclavicular adenopathy bilaterally.    Imagin2021: [facility]  Is extremely dense.  There are round, Morphis and punctate calcifications with scattered distribution seen in both breasts.  There are no significant changes from prior exams.  Parenchyma include stable nodular asymmetries.  No suspicious masses, suspicious microcalcifications or areas of architectural distortion are identified.  Impression:  Calcifications in both breasts are benign negative.  Screening mammogram 1 year is recommended.  BI-RADS Category 2    2021 MRI breast bilateral [facility]  FINDINGS: There is extreme fibroglandular tissue. There is marked  background parenchymal enhancement, which limits the sensitivity for  detection of invasive malignancy and DCIS.  RIGHT BREAST:    No suspicious enhancing mass or area of non-mass enhancement is  identified.  The visualized axilla is within normal limits.    LEFT BREAST:    No suspicious enhancing mass or area of non-mass enhancement is  identified.  The visualized axilla is within normal limits.   EXTRAMAMMARY FINDINGS:   There are no abnormally enlarged internal mammary chain lymph nodes on  either side.     There is trace pleural fluid on the right.  IMPRESSION AND RECOMMENDATION: No MRI evidence of malignancy in either  breast. Recommend annual screening mammogram and breast MRI in one year.  BI-RADS Category 1:    11/9/2022 bilateral screening mammogram with [facility] at [facility]  The breasts are extremely dense.  There are round amorphous and punctuate calcifications with scattered distribution seen in both breast.  There are no significant changes from the prior exam.  The parenchyma include stable nodular asymmetries.  No suspicious masses suspicious microcalcifications or areas of architectural distortion identified.  Impression  Calcifications in both breast are benign negative.  BI-RADS 2    5/1/2023 breast MRI [facility]  FINDINGS: There is heterogeneous fibroglandular tissue. There is minimal  background parenchymal enhancement.  RIGHT BREAST:    There is intrinsic T1 hyperintense signal within multiple ducts in the  right breast, consistent with proteinaceous and/or hemorrhagic contents.  No suspicious enhancing mass or area of non-mass enhancement is  identified.  The visualized axilla is within normal limits.   LEFT BREAST:    No suspicious enhancing mass or area of non-mass enhancement is  identified.  The visualized axilla is within normal limits.   EXTRAMAMMARY FINDINGS:   There are no pathologically enlarged internal mammary chain lymph nodes  on either side.     IMPRESSION AND RECOMMENDATION:  No MRI evidence of malignancy in either breast. Recommend correlation  with annual screening mammogram.  BI-RADS Category 2: Benign    11/16/2023 bilateral screening mammogram at [facility]  The breasts are extremely dense, which lowers the sensitivity of mammography.  No  suspicious masses, significant calcifications or other abnormalities are seen.  IMPRESSION:  There is no mammographic evidence of malignancy.  Screening mammogram in 1 year is recommended.  The patient was mailed a notification letter.  BI-RADS Category 1: Negative    5/16/2024 bilateral breast MRI [facility]  FINDINGS: There is heterogeneous fibroglandular tissue. There is minimal  background parenchymal enhancement.  RIGHT BREAST:    At 7:00 in the middle to anterior right breast, 2 cm posterior to the  nipple, there is a 0.8 x 0.7 x 1.1 cm enhancing possibly cystic and  solid mass, which is suspicious.  There is intrinsic T1 hyperintense signal within multiple ducts in the  right breast, consistent with proteinaceous/hemorrhagic contents.   The visualized axilla is within normal limits.  LEFT BREAST:    No suspicious enhancing mass or area of non-mass enhancement is  identified.  The visualized axilla is within normal limits.  EXTRAMAMMARY FINDINGS:  There are no pathologically enlarged internal mammary chain lymph nodes  on either side.    IMPRESSION AND RECOMMENDATION:  1.  Suspicious 1.1 cm mass at 7:00 in the right breast. Recommend  further evaluation with targeted ultrasound, followed by possible  ultrasound-guided core needle biopsy versus MRI guided core needle  biopsy if no sonographic correlate is identified.  2.  No MRI evidence of malignancy in the left breast.  An epic in basket message was sent to SNOW Huber on 5/16/2024.  BI-RADS Category 4    5/17/2024 right Limited breast ultrasound at [facility]  FINDINGS: Targeted sonographic evaluation of the right breast was  performed through the 7 o'clock position. Comparison is made to a breast  MRI examination dated 05/16/2024.  Mild ductal ectasia of a benign character is visualized. No sonographic  correlate to the suspicious enhancement/lesion in the right breast at  the 7 o'clock position is appreciated.  IMPRESSION:  There are no sonographic findings that  are suspicious for  malignancy in the right breast and no sonographic correlate to the MRI  visualized lesion is appreciated. Only benign-appearing ductal ectasia  in the region is appreciated. This is considered a benign examination,  however, I recommend that the patient follow through with the MR biopsy  which was previously recommended.  BI-RADS CATEGORY 2    6/19/2024 right MRI breast biopsy at [facility]  PATHOLOGY:  Final Diagnosis  1.  Breast, right, 7:00, MRI biopsy (stoplight clip): Benign breast  tissue with  -A.  Nonintact intraductal papilloma measuring up to 3.3 mm on the slide  -B.  Calcium oxalate crystals identified  -C.  Clustered cysts with microcalcifications in the surrounding cyst  wall with inflammation  -D.  Duct ectasia  -E.  Sclerosing adenosis  -F. Fibroadenomatoid hyperplasia  -G.  Moderate ductal hyperplasia of the usual type  Electronically signed by Paulino Abreu MD on 6/20/2024 at 1303  IMPRESSION/RECOMMENDATIONS:  1. MRI guided biopsy of a 1.1 cm mass at 7:00 in the anterior right  breast, marked by a stoplight clip, which is slightly medially  displaced, as above. Pathology demonstrates intraductal papilloma, which  is concordant with the imaging assessment. Recommend surgical  management.    2/6/2025 bilateral screening mammogram at [facility]  The breasts are extremely dense, which lowers the sensitivity of mammography.  There are round calcifications with clustered distribution seen in both breasts. There are no significant changes  from the prior exam(s).  The parenchyma includes stable nodular asymmetries.  No suspicious masses, suspicious  microcalcifications or areas of architectural distortion are identified.  IMPRESSION:  Calcifications in both breasts are benign-negative.  Screening mammogram in 1 year is recommended.  The patient was mailed a notification letter.  BI-RADS Category 2: Benign        Labs:    09/30/2019  [facility]  Genetic   Cari Mathis  BRCA1 and BRCA2  [place]  BRCA2 - c.4747A>T; UNCERTAIN CLINICAL SIGNIFICANCE      [facility] Cancer Screen  2019  [facility]    Genetic   Cari Mathis  GENETIC RESULT: NEGATIVE - NO CLINICALLY SIGNIFICANT MUTATION IDENTIFIED  ADDITIONAL FINDINGS:  GENE    VARIANT(S) OF UNCERTAIN SIGNIFICANCE  BRCA2    c.4747A>T  MLH1    c. 896G>A        Assessment:    1- Benign breast changes    2- breast density  Extremely dense breast tissue on mammography    3- Family history of breast cancer,  CRA risk assessment (5 models)  As:8-25%  Mother age 32, , did not have genetic testing  PGM age 70s    4- Increased risk of breast cancer- 30.7% (2025)  My Risk- 5-2019  -VUS in BRCA2 c.4747A>T  -VUS in MLH1 c.896G>A    Discussion:  Her breast tissue is extremely dense.  Breast density describes how the breasts look on a mammogram.  Breast and connective tissue are denser than fat and this difference shows up on the mammogram.  Young women often have dense breasts.  As we age, breast become less dense.  Dense breast can make it harder to find breast cancer on the mammogram.  Women with high breast density have an increased risk of breast cancer.  Educational materials regarding breast density were given and reviewed.  2. We discussed management options for individuals who are at increased risk (>20% lifetime risk):  1) High risk screening - Annual mammogram and annual breast MRI, alternating one test every 6 months, biannual clinical exam and monthly self breast exam. She meets criteria for high risk screening.  2) Chemoprevention with Tamoxifen, Raloxifene or Exemestane. These may reduce risk up to 50%. I reviewed that these particular medications are not without risks and the risk/benefit ratio must be considered carefully. She is not interested in this currently.  3) Risk reducing surgery such as prophylactic mastectomy  which may reduce risk by 90-95%. We discussed that this is a relatively radical strategy and is  generally reserved for individuals with a known genetic mutation predisposing them to an increased risk (~50% risk) of breast cancer. She does not meet criteria for risk reducing surgery.   4) I discussed the importance of exercise and weight management as part of a risk reducing strategy, since increased BMI is associated with an increased risk of breast cancer. This is especially true in her case, as I expect her risk would decrease as she lost weight.    Risk reducing agents should be recommended when 5 year risk per Deisi model is at least 3% or 10 year risk by Tyrer Cuzick model is at least 5%.  Referral to medical oncology made to discuss.      Plan:  1. Breast MRI august followed by exam  2. Monthly self breast exams  3. rto if needed with new concerns      Cari and her  Ian are neighbors of Dr Solis in [facility].    SNOW Huber/transcription disclaimer:  Dictated using Dragon dictation

## 2025-02-18 ENCOUNTER — OFFICE VISIT (OUTPATIENT)
Dept: SURGERY | Facility: CLINIC | Age: 50
End: 2025-02-18
Payer: COMMERCIAL

## 2025-02-18 VITALS
HEIGHT: 62 IN | WEIGHT: 115 LBS | DIASTOLIC BLOOD PRESSURE: 78 MMHG | OXYGEN SATURATION: 98 % | HEART RATE: 95 BPM | SYSTOLIC BLOOD PRESSURE: 128 MMHG | BODY MASS INDEX: 21.16 KG/M2

## 2025-02-18 DIAGNOSIS — Z91.89 INCREASED RISK OF BREAST CANCER: Primary | ICD-10-CM

## 2025-02-18 DIAGNOSIS — N60.99 ATYPICAL LOBULAR HYPERPLASIA (ALH) OF BREAST: ICD-10-CM

## 2025-02-18 PROCEDURE — 99213 OFFICE O/P EST LOW 20 MIN: CPT | Performed by: NURSE PRACTITIONER

## 2025-02-18 NOTE — PROGRESS NOTES
Appointment (New patient)        REASON FOR CONSULTATION: Atypical lobular hyperplasia, right breast                              REQUESTING PHYSICIAN: Caitie Solis MD  RECORDS OBTAINED:  Records of the patients history including those from the electronic medical record were reviewed and summarized in detail.    HISTORY OF PRESENT ILLNESS:  The patient is a 49 y.o. year old female      Family hx  Mom - breast cancer at 31 years  Paternal great aunt - breast cancer  Paternal GF - lung cancer (smoker)      Gyn history:  Age at first childbirth - 32 years  Age at menarche - 15 years  Lactation/how long - about a month  Age at menopause - 47 years  Total years of oral contraceptive use -no  Total years of hormone replacement therapy - no    Past Medical History:   Diagnosis Date    Allergic     Anemia     Anxiety     Fibrocystic breast changes, bilateral 2021    History of UTI     Hypertension     Kidney stones     Migraines     Papilloma of breast     PONV (postoperative nausea and vomiting)     Raynaud's disease      Past Surgical History:   Procedure Laterality Date    ABDOMINOPLASTY      with mesh    BREAST BIOPSY Right 10/24/2024    Procedure: RIGHT breast needle localized excision papilloma.;  Surgeon: Caitie Solis MD;  Location: [facility];  Service: General;  Laterality: Right;     SECTION         MEDICATIONS    Current Outpatient Medications:     fluticasone (FLONASE) 50 MCG/ACT nasal spray, Administer 2 sprays into the nostril(s) as directed by provider Daily As Needed., Disp: , Rfl:     metoprolol succinate XL (TOPROL-XL) 50 MG 24 hr tablet, Take 1 tablet by mouth Daily., Disp: , Rfl:     multivitamin with minerals (MULTIVITAMIN ADULT PO), Take 1 tablet by mouth Daily. HOLD PRIOR TO SURGERY, Disp: , Rfl:     VITAMIN D, CHOLECALCIFEROL, PO, Take  by mouth., Disp: , Rfl:     ALLERGIES:     Allergies   Allergen Reactions    Nitrofurantoin Hives    Ciprofloxacin-Hydrocortisone  "Hives     CIPRO AND DILAUDID TAKEN SIMULTANEOUSLY WHEN REACTION OCCURED    Dilaudid [Hydromorphone Hcl] Hives     CIPRO AND DILAUDID TAKEN SIMULTANEOUSLY WHEN REACTION OCCURED    Hibiclens [Chlorhexidine Gluconate] Rash       SOCIAL HISTORY:       Social History     Socioeconomic History    Marital status:    Tobacco Use    Smoking status: Never     Passive exposure: Never    Smokeless tobacco: Never   Vaping Use    Vaping status: Never Used   Substance and Sexual Activity    Alcohol use: No    Drug use: Never    Sexual activity: Yes     Partners: Male     Birth control/protection: None         FAMILY HISTORY:  Family History   Problem Relation Age of Onset    Ovarian cancer Mother     Breast cancer Mother 32    Hypertension Father     Polymyalgia rheumatica Father     No Known Problems Sister     No Known Problems Brother     No Known Problems Daughter     No Known Problems Son     No Known Problems Maternal Aunt     No Known Problems Paternal Aunt     Breast cancer Paternal Aunt         p great aunt  unknown age     No Known Problems Maternal Grandmother     Melanoma Paternal Grandmother 70    Skin cancer Paternal Grandmother     Alcohol abuse Paternal Grandfather     Stroke Paternal Grandfather     BRCA 1/2 Neg Hx     Colon cancer Neg Hx     Endometrial cancer Neg Hx     Uterine cancer Neg Hx     Malig Hyperthermia Neg Hx        REVIEW OF SYSTEMS:  Review of Systems   Constitutional: Negative.    Respiratory: Negative.     Cardiovascular: Negative.    Gastrointestinal: Negative.    Genitourinary: Negative.    Hematological: Negative.               Vitals:    02/20/25 1342   BP: 143/82   Pulse: 80   Resp: 16   Temp: 98.1 °F (36.7 °C)   TempSrc: Oral   SpO2: 99%   Weight: 51.8 kg (114 lb 3.2 oz)   Height: 157.5 cm (62.01\")   PainSc: 0-No pain         2/20/2025     1:43 PM   Current Status   ECOG score 0        PHYSICAL EXAM:    Physical Exam  Constitutional:       Appearance: Normal appearance.   HENT:      " Head: Normocephalic and atraumatic.      Mouth/Throat:      Mouth: Mucous membranes are moist.      Pharynx: Oropharynx is clear.   Eyes:      Extraocular Movements: Extraocular movements intact.      Pupils: Pupils are equal, round, and reactive to light.   Cardiovascular:      Rate and Rhythm: Normal rate and regular rhythm.   Pulmonary:      Effort: Pulmonary effort is normal.      Breath sounds: Normal breath sounds.   Chest:       Abdominal:      General: Bowel sounds are normal.      Palpations: Abdomen is soft.   Musculoskeletal:      Cervical back: Normal range of motion and neck supple.   Neurological:      General: No focal deficit present.      Mental Status: She is alert and oriented to person, place, and time.   Psychiatric:         Mood and Affect: Mood normal.         Behavior: Behavior normal.           RECENT LABS:        WBC   Date Value Ref Range Status   02/20/2025 5.05 3.40 - 10.80 10*3/mm3 Final   10/16/2024 3.76 3.40 - 10.80 10*3/mm3 Final   05/20/2024 3.75 (L) 4.5 - 11.0 10*3/uL Final   09/25/2023 6.83 4.5 - 11.0 10*3/uL Final   02/27/2023 4.20 (L) 4.5 - 11.0 10*3/uL Final   01/24/2023 5.19 3.40 - 10.80 10*3/mm3 Final   12/10/2021 4.67 4.5 - 11.0 10*3/uL Final   11/23/2020 3.62 (L) 4.5 - 11.0 10*3/uL Final   11/20/2019 4.26 (L) 4.5 - 11.0 10*3/uL Final   06/24/2019 5.04 3.40 - 10.80 10*3/mm3 Final   05/17/2019 5.77 3.40 - 10.80 10*3/mm3 Final   11/15/2018 4.1 (L) 4.5 - 11.0 10*3/uL Final     Hemoglobin   Date Value Ref Range Status   02/20/2025 11.5 (L) 12.0 - 15.9 g/dL Final   10/16/2024 11.0 (L) 12.0 - 15.9 g/dL Final   05/20/2024 11.8 (L) 12.0 - 16.0 g/dL Final   09/25/2023 11.1 (L) 12.0 - 16.0 g/dL Final   02/27/2023 12.2 12.0 - 16.0 g/dL Final   01/24/2023 11.7 (L) 12.0 - 15.9 g/dL Final   12/10/2021 11.1 (L) 12.0 - 16.0 g/dL Final   11/23/2020 11.7 (L) 12.0 - 16.0 g/dL Final   11/20/2019 11.9 (L) 12.0 - 16.0 g/dL Final   06/24/2019 11.7 (L) 12.0 - 15.9 g/dL Final   05/17/2019 10.3 (L)  12.0 - 15.9 g/dL Final   11/15/2018 11.1 (L) 12.0 - 16.0 g/dL Final     Platelets   Date Value Ref Range Status   02/20/2025 192 140 - 450 10*3/mm3 Final   10/16/2024 173 140 - 450 10*3/mm3 Final   05/20/2024 209 140 - 440 10*3/uL Final   09/25/2023 220 140 - 440 10*3/uL Final   02/27/2023 232 140 - 440 10*3/uL Final   01/24/2023 216 140 - 450 10*3/mm3 Final   12/10/2021 218 140 - 440 10*3/uL Final   11/23/2020 223 140 - 440 10*3/uL Final   11/20/2019 273 140 - 440 10*3/uL Final   06/24/2019 232 140 - 450 10*3/mm3 Final   05/17/2019 224 140 - 450 10*3/mm3 Final   11/15/2018 239 150 - 450 10*3/uL Final       Pathology:  Tissue Pathology Exam (10/24/2024 09:02)   Tissue Pathology Exam (06/19/2024 09:05)     Imaging:  MRI Breast Bilateral Diagnostic W WO Contrast (05/16/2024 10:50)       Assessment:  *Atypical lobular hyperplasia, right breast  *Postmenopausal  *Extremely dense breast tissue on mammogram  *5-year Deisi risk score 4.2%  11/16/2023 bilateral screening [id number] 1.  5/16/2024 bilateral breast MRI-suspicious 1.1 cm mass at 7:00 in right breast.  Further evaluation with targeted ultrasound followed by possible ultrasound-guided core needle biopsy versus MRI guided core needle biopsy if no sonographic correlate identified.  No apparent evidence of malignancy right breast  6/19/2024 right breast 7:00 MRI guided biopsy-nonintact intraductal papilloma measuring 3.3 mm on slide.  Clustered cyst with microcalcification and surrounding cyst wall with inflammation.  Duct ectasia.  Sclerosing adenosis.  Fibroadenomatoid hyperplasia.  Moderate ductal hyperplasia of usual type.  10/24/2024 status post right breast excisional biopsy-atypical lobular hyperplasia with focal calcification.  Sclerosing adenosis.  Florid ductal hyperplasia of usual type.  February 2025 bilateral screening mammogram at [facility]-BI-RADS 2  2/20/2025: Reviewed diagnosis and management.  Discussed chemoprophylaxis with endocrine treatment  (Arimidex versus tamoxifen versus raloxifene) for 5 years.  Reviewed possible side effects of tamoxifen including but not limited to, hot flashes, night sweats, mood swings, vaginal dryness, rare but possible risk of blood clots, cataracts.reviewed side effects of raloxifene including but not limited to increased risk of VTE, cardiovascular disease, hepatic impairment, hot flashes. We did discuss that raloxifene has protective effect on bone density.  We discussed the most commonly noted side effects of anastrozole inhibitors including hot flashes, joint pains/arthralgias, muscle aches, gi effects including stomach upset and loose stools, mood changes including depression, forgetfullness including difficulty with concentration, and bone mineral density changes (loss).  The need for a baseline DEXA scan was discussed with the patient and she will have a baseline DEXA scan ordered.     *Genetics  May 2019 [place]ie in BRCA2 and MLH1      Plan  Obtain baseline DEXA scan  Televisit in 1 week after DEXA scan to finalize management  She is scheduled for bilateral breast MRI in August 2025.  Ordered by high risk breast clinic  Continue follow-up with High risk breast clinic

## 2025-02-20 ENCOUNTER — LAB (OUTPATIENT)
Dept: LAB | Facility: HOSPITAL | Age: 50
End: 2025-02-20
Payer: COMMERCIAL

## 2025-02-20 ENCOUNTER — CONSULT (OUTPATIENT)
Dept: ONCOLOGY | Facility: CLINIC | Age: 50
End: 2025-02-20
Payer: COMMERCIAL

## 2025-02-20 VITALS
HEART RATE: 80 BPM | DIASTOLIC BLOOD PRESSURE: 82 MMHG | BODY MASS INDEX: 21.02 KG/M2 | WEIGHT: 114.2 LBS | SYSTOLIC BLOOD PRESSURE: 143 MMHG | RESPIRATION RATE: 16 BRPM | HEIGHT: 62 IN | OXYGEN SATURATION: 99 % | TEMPERATURE: 98.1 F

## 2025-02-20 DIAGNOSIS — Z78.0 POSTMENOPAUSAL: Primary | ICD-10-CM

## 2025-02-20 DIAGNOSIS — R79.89 ABNORMAL CBC: Primary | ICD-10-CM

## 2025-02-20 LAB
BASOPHILS # BLD AUTO: 0.1 10*3/MM3 (ref 0–0.2)
BASOPHILS NFR BLD AUTO: 2 % (ref 0–1.5)
DEPRECATED RDW RBC AUTO: 41.5 FL (ref 37–54)
EOSINOPHIL # BLD AUTO: 0.06 10*3/MM3 (ref 0–0.4)
EOSINOPHIL NFR BLD AUTO: 1.2 % (ref 0.3–6.2)
ERYTHROCYTE [DISTWIDTH] IN BLOOD BY AUTOMATED COUNT: 12.7 % (ref 12.3–15.4)
HCT VFR BLD AUTO: 34.8 % (ref 34–46.6)
HGB BLD-MCNC: 11.5 G/DL (ref 12–15.9)
IMM GRANULOCYTES # BLD AUTO: 0.03 10*3/MM3 (ref 0–0.05)
IMM GRANULOCYTES NFR BLD AUTO: 0.6 % (ref 0–0.5)
LYMPHOCYTES # BLD AUTO: 1.43 10*3/MM3 (ref 0.7–3.1)
LYMPHOCYTES NFR BLD AUTO: 28.3 % (ref 19.6–45.3)
MCH RBC QN AUTO: 29.6 PG (ref 26.6–33)
MCHC RBC AUTO-ENTMCNC: 33 G/DL (ref 31.5–35.7)
MCV RBC AUTO: 89.7 FL (ref 79–97)
MONOCYTES # BLD AUTO: 0.27 10*3/MM3 (ref 0.1–0.9)
MONOCYTES NFR BLD AUTO: 5.3 % (ref 5–12)
NEUTROPHILS NFR BLD AUTO: 3.16 10*3/MM3 (ref 1.7–7)
NEUTROPHILS NFR BLD AUTO: 62.6 % (ref 42.7–76)
NRBC BLD AUTO-RTO: 0 /100 WBC (ref 0–0.2)
PLATELET # BLD AUTO: 192 10*3/MM3 (ref 140–450)
PMV BLD AUTO: 10 FL (ref 6–12)
RBC # BLD AUTO: 3.88 10*6/MM3 (ref 3.77–5.28)
WBC NRBC COR # BLD AUTO: 5.05 10*3/MM3 (ref 3.4–10.8)

## 2025-02-20 PROCEDURE — 85025 COMPLETE CBC W/AUTO DIFF WBC: CPT

## 2025-02-20 PROCEDURE — 36415 COLL VENOUS BLD VENIPUNCTURE: CPT

## 2025-03-01 ENCOUNTER — HOSPITAL ENCOUNTER (OUTPATIENT)
Dept: BONE DENSITY | Facility: HOSPITAL | Age: 50
Discharge: HOME | End: 2025-03-01
Payer: COMMERCIAL

## 2025-03-01 DIAGNOSIS — Z78.0 POSTMENOPAUSAL: ICD-10-CM

## 2025-03-01 PROCEDURE — 77080 DXA BONE DENSITY AXIAL: CPT

## 2025-03-06 ENCOUNTER — TELEPHONE (OUTPATIENT)
Dept: ONCOLOGY | Facility: CLINIC | Age: 50
End: 2025-03-06
Payer: COMMERCIAL

## 2025-03-06 DIAGNOSIS — Z78.0 POSTMENOPAUSAL: ICD-10-CM

## 2025-03-06 DIAGNOSIS — M81.0 OSTEOPOROSIS, UNSPECIFIED OSTEOPOROSIS TYPE, UNSPECIFIED PATHOLOGICAL FRACTURE PRESENCE: Primary | ICD-10-CM

## 2025-03-06 DIAGNOSIS — N60.99 ATYPICAL LOBULAR HYPERPLASIA (ALH) OF BREAST: ICD-10-CM

## 2025-03-06 RX ORDER — RALOXIFENE HYDROCHLORIDE 60 MG/1
60 TABLET, FILM COATED ORAL DAILY
Qty: 90 TABLET | Refills: 11 | Status: SHIPPED | OUTPATIENT
Start: 2025-03-06 | End: 2025-06-04

## 2025-03-06 NOTE — TELEPHONE ENCOUNTER
Called the patient to review the results of her DEXA scan.  Unfortunately she has osteoporosis.  Discussed raloxifene (Evista) for chemoprophylaxis . Reviewed possible side effects of raloxifene including but not limited to, hot flashes, night sweats, mood swings, vaginal dryness, rare but possible risk of blood clots, strokes, uterine cancer, cataracts.reviewed side effects of raloxifene including but not limited to increased risk of VTE, cardiovascular disease, hepatic impairment, hot flashes. We did discuss that raloxifene has protective effect on bone density.      I further discussed referral to endocrinology for evaluation of any secondary causes of her osteoporosis.  Has been requested referral to Dr. Cyrus Peterson at [place].  Referral will be placed.    Patient has been interested in starting raloxifene.  Prescription sent to preferred pharmacy.    Patient will be given follow-up appointment with their office in 2 months with labs

## 2025-05-07 NOTE — PROGRESS NOTES
Follow-up    5/12/2025, interval history:  Here for follow-up on Evista.  Accompanied by .  Tolerating it quite well.  No hot flashes night sweats mood swings vaginal dryness    HISTORY OF PRESENT ILLNESS:  The patient is a postmenopausal 50 y.o. year old female with no significant past medical history, referred to our clinic for evaluation management of right breast atypical lobular hyperplasia.  History outlined below.  She is here accompanied by her .  Recovered well from her recent surgery.  11/16/2023 bilateral screening [id number] 1.  5/16/2024 bilateral breast MRI-suspicious 1.1 cm mass at 7:00 in right breast.  Further evaluation with targeted ultrasound followed by possible ultrasound-guided core needle biopsy versus MRI guided core needle biopsy if no sonographic correlate identified.  No apparent evidence of malignancy right breast  6/19/2024 right breast 7:00 MRI guided biopsy-nonintact intraductal papilloma measuring 3.3 mm on slide.  Clustered cyst with microcalcification and surrounding cyst wall with inflammation.  Duct ectasia.  Sclerosing adenosis.  Fibroadenomatoid hyperplasia.  Moderate ductal hyperplasia of usual type.  10/24/2024 status post right breast excisional biopsy-atypical lobular hyperplasia with focal calcification.  Sclerosing adenosis.  Florid ductal hyperplasia of usual type.  February 2025 bilateral screening mammogram at [facility]-BI-RADS 2    Family hx  Mom - breast cancer at 31 years  Paternal great aunt - breast cancer  Paternal GF - lung cancer (smoker)      Gyn history:  Age at first childbirth - 32 years  Age at menarche - 15 years  Lactation/how long - about a month  Age at menopause - 47 years  Total years of oral contraceptive use -no  Total years of hormone replacement therapy - no          MEDICATIONS    Current Outpatient Medications:     fluticasone (FLONASE) 50 MCG/ACT nasal spray, Administer 2 sprays into the nostril(s) as directed by provider  "Daily As Needed., Disp: , Rfl:     multivitamin with minerals (MULTIVITAMIN ADULT PO), Take 1 tablet by mouth Daily. HOLD PRIOR TO SURGERY, Disp: , Rfl:     raloxifene (EVISTA) 60 MG tablet, Take 1 tablet by mouth Daily for 90 days., Disp: 90 tablet, Rfl: 11    VITAMIN D, CHOLECALCIFEROL, PO, Take  by mouth., Disp: , Rfl:     metoprolol succinate XL (TOPROL-XL) 50 MG 24 hr tablet, Take 1 tablet by mouth Daily., Disp: , Rfl:     ALLERGIES:     Allergies   Allergen Reactions    Nitrofurantoin Hives    Ciprofloxacin-Hydrocortisone Hives     CIPRO AND DILAUDID TAKEN SIMULTANEOUSLY WHEN REACTION OCCURED    Dilaudid [Hydromorphone Hcl] Hives     CIPRO AND DILAUDID TAKEN SIMULTANEOUSLY WHEN REACTION OCCURED    Hibiclens [Chlorhexidine Gluconate] Rash       I have reviewed Past Medical, Surgical History, Social History, Meds and Allergies.     REVIEW OF SYSTEMS:  See interval History               Vitals:    05/12/25 1053   BP: 121/91   Pulse: 87   Resp: 16   Temp: 98.2 °F (36.8 °C)   TempSrc: Temporal   SpO2: 97%   Weight: 53.9 kg (118 lb 12.8 oz)   Height: 157.5 cm (62\")   PainSc: 0-No pain           5/12/2025    10:57 AM   Current Status   ECOG score 0        PHYSICAL EXAM:    Physical Exam  Constitutional:       Appearance: Normal appearance.   HENT:      Head: Normocephalic and atraumatic.      Mouth/Throat:      Mouth: Mucous membranes are moist.      Pharynx: Oropharynx is clear.   Eyes:      Extraocular Movements: Extraocular movements intact.      Pupils: Pupils are equal, round, and reactive to light.   Cardiovascular:      Rate and Rhythm: Normal rate and regular rhythm.   Pulmonary:      Effort: Pulmonary effort is normal.      Breath sounds: Normal breath sounds.   Chest:   Breasts:     Right: No mass, nipple discharge or skin change.      Left: No mass, nipple discharge or skin change.       Abdominal:      General: Bowel sounds are normal.      Palpations: Abdomen is soft.   Musculoskeletal:      Cervical " back: Normal range of motion and neck supple.   Lymphadenopathy:      Upper Body:      Right upper body: No axillary adenopathy.      Left upper body: No axillary adenopathy.   Neurological:      General: No focal deficit present.      Mental Status: She is alert and oriented to person, place, and time.   Psychiatric:         Mood and Affect: Mood normal.         Behavior: Behavior normal.         I have reexamined the patient and the results are consistent with the previously documented exam. Nell Juarez MD     RECENT LABS:        WBC   Date Value Ref Range Status   05/12/2025 3.74 3.40 - 10.80 10*3/mm3 Final   02/20/2025 5.05 3.40 - 10.80 10*3/mm3 Final   10/16/2024 3.76 3.40 - 10.80 10*3/mm3 Final   05/20/2024 3.75 (L) 4.5 - 11.0 10*3/uL Final   09/25/2023 6.83 4.5 - 11.0 10*3/uL Final   02/27/2023 4.20 (L) 4.5 - 11.0 10*3/uL Final   01/24/2023 5.19 3.40 - 10.80 10*3/mm3 Final   12/10/2021 4.67 4.5 - 11.0 10*3/uL Final   11/23/2020 3.62 (L) 4.5 - 11.0 10*3/uL Final   11/20/2019 4.26 (L) 4.5 - 11.0 10*3/uL Final   06/24/2019 5.04 3.40 - 10.80 10*3/mm3 Final   05/17/2019 5.77 3.40 - 10.80 10*3/mm3 Final   11/15/2018 4.1 (L) 4.5 - 11.0 10*3/uL Final     Hemoglobin   Date Value Ref Range Status   05/12/2025 11.7 (L) 12.0 - 15.9 g/dL Final   02/20/2025 11.5 (L) 12.0 - 15.9 g/dL Final   10/16/2024 11.0 (L) 12.0 - 15.9 g/dL Final   05/20/2024 11.8 (L) 12.0 - 16.0 g/dL Final   09/25/2023 11.1 (L) 12.0 - 16.0 g/dL Final   02/27/2023 12.2 12.0 - 16.0 g/dL Final   01/24/2023 11.7 (L) 12.0 - 15.9 g/dL Final   12/10/2021 11.1 (L) 12.0 - 16.0 g/dL Final   11/23/2020 11.7 (L) 12.0 - 16.0 g/dL Final   11/20/2019 11.9 (L) 12.0 - 16.0 g/dL Final   06/24/2019 11.7 (L) 12.0 - 15.9 g/dL Final   05/17/2019 10.3 (L) 12.0 - 15.9 g/dL Final   11/15/2018 11.1 (L) 12.0 - 16.0 g/dL Final     Platelets   Date Value Ref Range Status   05/12/2025 176 140 - 450 10*3/mm3 Final   02/20/2025 192 140 - 450 10*3/mm3 Final   10/16/2024 173 140 -  450 10*3/mm3 Final   05/20/2024 209 140 - 440 10*3/uL Final   09/25/2023 220 140 - 440 10*3/uL Final   02/27/2023 232 140 - 440 10*3/uL Final   01/24/2023 216 140 - 450 10*3/mm3 Final   12/10/2021 218 140 - 440 10*3/uL Final   11/23/2020 223 140 - 440 10*3/uL Final   11/20/2019 273 140 - 440 10*3/uL Final   06/24/2019 232 140 - 450 10*3/mm3 Final   05/17/2019 224 140 - 450 10*3/mm3 Final   11/15/2018 239 150 - 450 10*3/uL Final       Pathology:  Tissue Pathology Exam (10/24/2024 09:02)   Tissue Pathology Exam (06/19/2024 09:05)     Imaging:  MRI Breast Bilateral Diagnostic W WO Contrast (05/16/2024 10:50)       Assessment:  *Atypical lobular hyperplasia, right breast  *Postmenopausal  *Extremely dense breast tissue on mammogram  *5-year Deisi risk score 4.2%  11/16/2023 bilateral screening [id number] 1.  5/16/2024 bilateral breast MRI-suspicious 1.1 cm mass at 7:00 in right breast.  Further evaluation with targeted ultrasound followed by possible ultrasound-guided core needle biopsy versus MRI guided core needle biopsy if no sonographic correlate identified.  No apparent evidence of malignancy right breast  6/19/2024 right breast 7:00 MRI guided biopsy-nonintact intraductal papilloma measuring 3.3 mm on slide.  Clustered cyst with microcalcification and surrounding cyst wall with inflammation.  Duct ectasia.  Sclerosing adenosis.  Fibroadenomatoid hyperplasia.  Moderate ductal hyperplasia of usual type.  10/24/2024 status post right breast excisional biopsy-atypical lobular hyperplasia with focal calcification.  Sclerosing adenosis.  Florid ductal hyperplasia of usual type.  February 2025 bilateral screening mammogram at [facility]-BI-RADS 2 March 2025: Started on raloxifene (given baseline osteoporosis) for chemoprevention for 5 years  May 2025: Tolerating raloxifene quite well.  No issues    *Genetics  May 2019 [place]ia-VUS in BRCA2 and MLH1    *Osteoporosis  2/20/2025 DEXA scan: Osteoporosis  She has referral to  endocrinology at [place] for further evaluation and management    Plan  Tolerating raloxifene quite well.    She is scheduled for bilateral breast MRI in August 2025.  Ordered by high risk breast clinic  Osteoporosis management per endocrinology  MD visit in 6 months with labs

## 2025-05-12 ENCOUNTER — OFFICE VISIT (OUTPATIENT)
Dept: ONCOLOGY | Facility: CLINIC | Age: 50
End: 2025-05-12
Payer: COMMERCIAL

## 2025-05-12 ENCOUNTER — LAB (OUTPATIENT)
Dept: OTHER | Facility: HOSPITAL | Age: 50
End: 2025-05-12
Payer: COMMERCIAL

## 2025-05-12 VITALS
SYSTOLIC BLOOD PRESSURE: 121 MMHG | HEART RATE: 87 BPM | HEIGHT: 62 IN | OXYGEN SATURATION: 97 % | DIASTOLIC BLOOD PRESSURE: 91 MMHG | WEIGHT: 118.8 LBS | BODY MASS INDEX: 21.86 KG/M2 | RESPIRATION RATE: 16 BRPM | TEMPERATURE: 98.2 F

## 2025-05-12 DIAGNOSIS — N60.99 ATYPICAL LOBULAR HYPERPLASIA (ALH) OF BREAST: ICD-10-CM

## 2025-05-12 DIAGNOSIS — Z79.899 HIGH RISK MEDICATION USE: ICD-10-CM

## 2025-05-12 DIAGNOSIS — Z78.0 POSTMENOPAUSAL: Primary | ICD-10-CM

## 2025-05-12 LAB
ALBUMIN SERPL-MCNC: 4.7 G/DL (ref 3.5–5.2)
ALBUMIN/GLOB SERPL: 1.7 G/DL
ALP SERPL-CCNC: 50 U/L (ref 39–117)
ALT SERPL W P-5'-P-CCNC: 17 U/L (ref 1–33)
ANION GAP SERPL CALCULATED.3IONS-SCNC: 11.5 MMOL/L (ref 5–15)
AST SERPL-CCNC: 23 U/L (ref 1–32)
BASOPHILS # BLD AUTO: 0.08 10*3/MM3 (ref 0–0.2)
BASOPHILS NFR BLD AUTO: 2.1 % (ref 0–1.5)
BILIRUB SERPL-MCNC: 0.3 MG/DL (ref 0–1.2)
BUN SERPL-MCNC: 24 MG/DL (ref 6–20)
BUN/CREAT SERPL: 36.9 (ref 7–25)
CALCIUM SPEC-SCNC: 9.5 MG/DL (ref 8.6–10.5)
CHLORIDE SERPL-SCNC: 105 MMOL/L (ref 98–107)
CO2 SERPL-SCNC: 25.5 MMOL/L (ref 22–29)
CREAT SERPL-MCNC: 0.65 MG/DL (ref 0.57–1)
DEPRECATED RDW RBC AUTO: 42.9 FL (ref 37–54)
EGFRCR SERPLBLD CKD-EPI 2021: 107.4 ML/MIN/1.73
EOSINOPHIL # BLD AUTO: 0.05 10*3/MM3 (ref 0–0.4)
EOSINOPHIL NFR BLD AUTO: 1.3 % (ref 0.3–6.2)
ERYTHROCYTE [DISTWIDTH] IN BLOOD BY AUTOMATED COUNT: 12.9 % (ref 12.3–15.4)
GLOBULIN UR ELPH-MCNC: 2.7 GM/DL
GLUCOSE SERPL-MCNC: 107 MG/DL (ref 65–99)
HCT VFR BLD AUTO: 36.1 % (ref 34–46.6)
HGB BLD-MCNC: 11.7 G/DL (ref 12–15.9)
IMM GRANULOCYTES # BLD AUTO: 0.02 10*3/MM3 (ref 0–0.05)
IMM GRANULOCYTES NFR BLD AUTO: 0.5 % (ref 0–0.5)
LYMPHOCYTES # BLD AUTO: 1.72 10*3/MM3 (ref 0.7–3.1)
LYMPHOCYTES NFR BLD AUTO: 46 % (ref 19.6–45.3)
MCH RBC QN AUTO: 29.1 PG (ref 26.6–33)
MCHC RBC AUTO-ENTMCNC: 32.4 G/DL (ref 31.5–35.7)
MCV RBC AUTO: 89.8 FL (ref 79–97)
MONOCYTES # BLD AUTO: 0.26 10*3/MM3 (ref 0.1–0.9)
MONOCYTES NFR BLD AUTO: 7 % (ref 5–12)
NEUTROPHILS NFR BLD AUTO: 1.61 10*3/MM3 (ref 1.7–7)
NEUTROPHILS NFR BLD AUTO: 43.1 % (ref 42.7–76)
NRBC BLD AUTO-RTO: 0 /100 WBC (ref 0–0.2)
PLATELET # BLD AUTO: 176 10*3/MM3 (ref 140–450)
PMV BLD AUTO: 10.3 FL (ref 6–12)
POTASSIUM SERPL-SCNC: 4 MMOL/L (ref 3.5–5.2)
PROT SERPL-MCNC: 7.4 G/DL (ref 6–8.5)
RBC # BLD AUTO: 4.02 10*6/MM3 (ref 3.77–5.28)
SODIUM SERPL-SCNC: 142 MMOL/L (ref 136–145)
WBC NRBC COR # BLD AUTO: 3.74 10*3/MM3 (ref 3.4–10.8)

## 2025-05-12 PROCEDURE — 80053 COMPREHEN METABOLIC PANEL: CPT | Performed by: STUDENT IN AN ORGANIZED HEALTH CARE EDUCATION/TRAINING PROGRAM

## 2025-05-12 PROCEDURE — 85025 COMPLETE CBC W/AUTO DIFF WBC: CPT | Performed by: STUDENT IN AN ORGANIZED HEALTH CARE EDUCATION/TRAINING PROGRAM

## 2025-05-12 PROCEDURE — 36415 COLL VENOUS BLD VENIPUNCTURE: CPT

## 2025-05-12 PROCEDURE — 99214 OFFICE O/P EST MOD 30 MIN: CPT | Performed by: STUDENT IN AN ORGANIZED HEALTH CARE EDUCATION/TRAINING PROGRAM

## 2025-07-07 RX ORDER — TAMOXIFEN CITRATE 10 MG/1
10 TABLET ORAL EVERY OTHER DAY
Qty: 15 TABLET | Refills: 3 | Status: SHIPPED | OUTPATIENT
Start: 2025-07-07

## 2025-07-09 ENCOUNTER — OFFICE VISIT (OUTPATIENT)
Dept: OBSTETRICS AND GYNECOLOGY | Age: 50
End: 2025-07-09
Payer: COMMERCIAL

## 2025-07-09 VITALS
BODY MASS INDEX: 21.35 KG/M2 | DIASTOLIC BLOOD PRESSURE: 76 MMHG | SYSTOLIC BLOOD PRESSURE: 126 MMHG | HEIGHT: 62 IN | WEIGHT: 116 LBS

## 2025-07-09 DIAGNOSIS — Z79.810 PROPHYLACTIC USE OF TAMOXIFEN: ICD-10-CM

## 2025-07-09 DIAGNOSIS — Z12.4 SCREENING FOR CERVICAL CANCER: ICD-10-CM

## 2025-07-09 DIAGNOSIS — Z01.419 WELL WOMAN EXAM WITH ROUTINE GYNECOLOGICAL EXAM: Primary | ICD-10-CM

## 2025-07-09 RX ORDER — METOPROLOL SUCCINATE 50 MG/1
50 TABLET, EXTENDED RELEASE ORAL DAILY
COMMUNITY

## 2025-07-09 NOTE — PROGRESS NOTES
[facility]   Obstetrics and Gynecology   Routine Annual Visit    2025    Patient: Cari Mathis          MR#:[id number]    History of Present Illness    Chief Complaint   Patient presents with    Annual Exam     AE today, Last AE 2024, Last pap 2022 w/pap nml and HPV neg, MG 2025, DEXA 2025, Colonoscopy 2024, No problems today       50 y.o. female  who presents for annual exam.  Amenorrheic.  Diagnosed with atypical lobular hyperplasia of the breast 10/2024.  Status post excisional biopsy.  Bilateral screening mammogram 2025 normal.  Scheduled for breast MRI 25.  Managed by breast surgery and hematology oncology.  Now plans to start tamoxifen for chemoprevention.  Also being treated for osteoporosis by endocrinology with Romosozumab.    Studies reviewed:  -SCANNED - LABS (2019) fhx mother with ovarian cancer, My[facility] testing neg  -Last colonoscopy 24 normal, repeat 10 yrs  -IGP, Apt HPV,rfx 16 / 18,45 (2022 08:28) NIL, HPV neg  DEXA Bone Density Axial (2025 11:07) osteoporosis    Obstetric History:  OB History          1    Para   1    Term   0       1    AB        Living   2         SAB        IAB        Ectopic        Molar        Multiple   1    Live Births   1               Menstrual History:     Patient's last menstrual period was 2023.       Sexual History:   Sexually active with  only, declines STD screen       Social History:   2 kids  Homemaker    ________________________________________  Patient Active Problem List   Diagnosis    Increased risk of breast cancer    BRCA negative    Fibrocystic breast changes, bilateral    Breast density    FH: breast cancer    Papilloma of right breast     Past Medical History:   Diagnosis Date    Allergic     Anemia     Anxiety     Fibrocystic breast changes, bilateral 2021    History of UTI     Hypertension     Kidney stones     Migraines     Papilloma of  breast     PONV (postoperative nausea and vomiting)     Raynaud's disease      Past Surgical History:   Procedure Laterality Date    ABDOMINOPLASTY      with mesh    BREAST BIOPSY Right 10/24/2024    Procedure: RIGHT breast needle localized excision papilloma.;  Surgeon: Caitie Solis MD;  Location: [facility] OR;  Service: General;  Laterality: Right;     SECTION       Social History     Tobacco Use   Smoking Status Never    Passive exposure: Never   Smokeless Tobacco Never     Family History   Problem Relation Age of Onset    Ovarian cancer Mother     Breast cancer Mother 32    Hypertension Father     Polymyalgia rheumatica Father     No Known Problems Sister     No Known Problems Brother     No Known Problems Daughter     No Known Problems Son     No Known Problems Maternal Aunt     No Known Problems Paternal Aunt     Breast cancer Paternal Aunt         p great aunt  unknown age     No Known Problems Maternal Grandmother     Melanoma Paternal Grandmother 70    Skin cancer Paternal Grandmother     Alcohol abuse Paternal Grandfather     Stroke Paternal Grandfather     BRCA 1/2 Neg Hx     Colon cancer Neg Hx     Endometrial cancer Neg Hx     Uterine cancer Neg Hx     Malig Hyperthermia Neg Hx      Prior to Admission medications    Medication Sig Start Date End Date Taking? Authorizing Provider   CALCIUM PO Take  by mouth.   Yes Vishnu Theodore MD   fluticasone (FLONASE) 50 MCG/ACT nasal spray Administer 2 sprays into the nostril(s) as directed by provider Daily As Needed.   Yes Vishnu Theodore MD   metoprolol succinate XL (TOPROL-XL) 50 MG 24 hr tablet Take 1 tablet by mouth Daily.   Yes Vishnu Theodore MD   multivitamin with minerals (MULTIVITAMIN ADULT PO) Take 1 tablet by mouth Daily. HOLD PRIOR TO SURGERY   Yes Vishnu Theodore MD   VITAMIN D, CHOLECALCIFEROL, PO Take  by mouth.   Yes Vishnu Theodore MD   tamoxifen (NOLVADEX) 10 MG tablet Take 1 tablet by mouth Every  "Other Day. Take 1 tablet by mouth every other day.  Start in 3-4 weeks  Patient not taking: Reported on 7/9/2025 7/7/25   Nell Juarez MD     ________________________________________    Current contraception: post menopausal status  History of abnormal Pap smear: no  Family history of uterine or ovarian cancer: no  Family History of colon cancer/colon polyps: no  History of abnormal mammogram: yes - see above    The following portions of the patient's history were reviewed and updated as appropriate: allergies, current medications, past family history, past medical history, past social history, past surgical history, and problem list.    Review of Systems   All other systems reviewed and are negative.           Objective     /76   Ht 157.5 cm (62\")   Wt 52.6 kg (116 lb)   LMP 01/01/2023   Breastfeeding No   BMI 21.22 kg/m²    BP Readings from Last 3 Encounters:   07/09/25 126/76   05/12/25 121/91   02/20/25 143/82      Wt Readings from Last 3 Encounters:   07/09/25 52.6 kg (116 lb)   05/12/25 53.9 kg (118 lb 12.8 oz)   02/20/25 51.8 kg (114 lb 3.2 oz)        BMI: Estimated body mass index is 21.22 kg/m² as calculated from the following:    Height as of this encounter: 157.5 cm (62\").    Weight as of this encounter: 52.6 kg (116 lb).    Physical Exam  Vitals and nursing note reviewed.   Constitutional:       General: She is not in acute distress.     Appearance: Normal appearance.   HENT:      Head: Normocephalic and atraumatic.   Eyes:      Extraocular Movements: Extraocular movements intact.   Cardiovascular:      Rate and Rhythm: Normal rate and regular rhythm.      Pulses: Normal pulses.      Heart sounds: No murmur heard.  Pulmonary:      Effort: Pulmonary effort is normal. No respiratory distress.      Breath sounds: Normal breath sounds.   Chest:   Breasts:     Right: Normal. No mass, nipple discharge, skin change or tenderness.      Left: Normal. No mass, nipple discharge, skin change or " tenderness.   Abdominal:      General: There is no distension.      Palpations: Abdomen is soft. There is no mass.      Tenderness: There is no abdominal tenderness.   Genitourinary:     General: Normal vulva.      Labia:         Right: No rash or lesion.         Left: No rash or lesion.       Urethra: No prolapse, urethral swelling or urethral lesion.      Vagina: Normal.      Cervix: Normal.      Uterus: Normal.       Adnexa: Right adnexa normal and left adnexa normal.      Comments: Bladder: no masses or tenderness  Perineum/Anus: no masses, lesions, or skin changes  Musculoskeletal:         General: No swelling. Normal range of motion.      Cervical back: Normal range of motion.   Lymphadenopathy:      Upper Body:      Right upper body: No axillary adenopathy.      Left upper body: No axillary adenopathy.   Skin:     General: Skin is warm and dry.   Neurological:      General: No focal deficit present.      Mental Status: She is alert and oriented to person, place, and time.   Psychiatric:         Mood and Affect: Mood normal.         Behavior: Behavior normal.         As part of wellness and prevention, the following topics were discussed with the patient:  Encouraged self breast exam  Physical activity and regular exercised encouraged.   Injury prevention discussed.  Healthy weight discussed.  Nutrition discussed.  Substance abuse/misuse discussed.  Sexual behavior/safe practices discussed.   Sexual transmitted disease prevention   Mental health discussed.           Assessment:  Diagnoses and all orders for this visit:    1. Well woman exam with routine gynecological exam (Primary)  -     IGP, Apt HPV,rfx 16 / 18,45    2. Screening for cervical cancer  -     IGP, Apt HPV,rfx 16 / 18,45    3. Prophylactic use of tamoxifen    -breast and pelvic normal  -pap today  -breast imaging management per breast surgery/heme onc  -colonoscopy up to date  -Discussed tamoxifen use and inc risk of uterine cancer.  Discussed  monitoring for any postmenopausal bleeding and notify me should this occur.      Plan:  Return in about 1 year (around 7/9/2026) for Annual.      Shirin Amado MD  7/9/2025 13:54 EDT

## 2025-07-11 LAB
CYTOLOGIST CVX/VAG CYTO: NORMAL
CYTOLOGY CVX/VAG DOC CYTO: NORMAL
CYTOLOGY CVX/VAG DOC THIN PREP: NORMAL
DX ICD CODE: NORMAL
HPV I/H RISK 4 DNA CVX QL PROBE+SIG AMP: NEGATIVE
OTHER STN SPEC: NORMAL
SERVICE CMNT-IMP: NORMAL
STAT OF ADQ CVX/VAG CYTO-IMP: NORMAL

## 2025-08-12 ENCOUNTER — HOSPITAL ENCOUNTER (OUTPATIENT)
Dept: MRI IMAGING | Facility: HOSPITAL | Age: 50
Discharge: HOME | End: 2025-08-12
Admitting: NURSE PRACTITIONER
Payer: COMMERCIAL

## 2025-08-12 DIAGNOSIS — Z91.89 INCREASED RISK OF BREAST CANCER: ICD-10-CM

## 2025-08-12 DIAGNOSIS — N60.99 ATYPICAL LOBULAR HYPERPLASIA (ALH) OF BREAST: ICD-10-CM

## 2025-08-12 PROCEDURE — 25510000002 GADOBENATE DIMEGLUMINE 529 MG/ML SOLUTION: Performed by: NURSE PRACTITIONER

## 2025-08-12 PROCEDURE — 77049 MRI BREAST C-+ W/CAD BI: CPT

## 2025-08-12 PROCEDURE — A9577 INJ MULTIHANCE: HCPCS | Performed by: NURSE PRACTITIONER

## 2025-08-12 RX ADMIN — GADOBENATE DIMEGLUMINE 10 ML: 529 INJECTION, SOLUTION INTRAVENOUS at 11:12

## 2025-08-19 ENCOUNTER — TELEPHONE (OUTPATIENT)
Dept: SURGERY | Facility: CLINIC | Age: 50
End: 2025-08-19
Payer: COMMERCIAL

## 2025-08-19 ENCOUNTER — OFFICE VISIT (OUTPATIENT)
Dept: SURGERY | Facility: CLINIC | Age: 50
End: 2025-08-19
Payer: COMMERCIAL

## 2025-08-19 VITALS
OXYGEN SATURATION: 99 % | SYSTOLIC BLOOD PRESSURE: 140 MMHG | WEIGHT: 118.2 LBS | HEART RATE: 87 BPM | DIASTOLIC BLOOD PRESSURE: 92 MMHG | BODY MASS INDEX: 21.75 KG/M2 | HEIGHT: 62 IN | RESPIRATION RATE: 16 BRPM

## 2025-08-19 DIAGNOSIS — Z91.89 INCREASED RISK OF BREAST CANCER: ICD-10-CM

## 2025-08-19 DIAGNOSIS — Z12.31 ENCOUNTER FOR SCREENING MAMMOGRAM FOR MALIGNANT NEOPLASM OF BREAST: Primary | ICD-10-CM

## 2025-08-19 PROCEDURE — 99213 OFFICE O/P EST LOW 20 MIN: CPT | Performed by: NURSE PRACTITIONER

## 2025-08-25 RX ORDER — TAMOXIFEN CITRATE 10 MG/1
10 TABLET ORAL
Refills: 3 | OUTPATIENT
Start: 2025-08-25

## (undated) DEVICE — BNDG,ELSTC,MATRIX,STRL,6"X5YD,LF,HOOK&LP: Brand: MEDLINE

## (undated) DEVICE — SYR LL TP 10ML STRL

## (undated) DEVICE — ANTIBACTERIAL UNDYED BRAIDED (POLYGLACTIN 910), SYNTHETIC ABSORBABLE SUTURE: Brand: COATED VICRYL

## (undated) DEVICE — SUT MNCRYL PLS ANTIB UD 4/0 PS2 18IN

## (undated) DEVICE — GLV SURG BIOGEL LTX PF 7

## (undated) DEVICE — STRIP,CLOSURE,WOUND,MEDI-STRIP,1/2X4: Brand: MEDLINE

## (undated) DEVICE — SKIN PREP TRAY W/CHG: Brand: MEDLINE INDUSTRIES, INC.

## (undated) DEVICE — SMOKE EVACUATION TUBING WITH 7/8 IN TO 1/4 IN REDUCER: Brand: BUFFALO FILTER

## (undated) DEVICE — NDL HYPO PRECISIONGLIDE REG 25G 1 1/2

## (undated) DEVICE — PK CHST BRST 40

## (undated) DEVICE — DRSNG WND BORDR/ADHS NONADHR/GZ LF 4X4IN STRL

## (undated) DEVICE — CONN TBG Y 5 IN 1 LF STRL